# Patient Record
Sex: MALE | Race: WHITE | NOT HISPANIC OR LATINO | Employment: OTHER
[De-identification: names, ages, dates, MRNs, and addresses within clinical notes are randomized per-mention and may not be internally consistent; named-entity substitution may affect disease eponyms.]

---

## 2017-01-04 ENCOUNTER — CHARTING TRANS (OUTPATIENT)
Dept: OTHER | Age: 82
End: 2017-01-04

## 2017-01-09 ENCOUNTER — CHARTING TRANS (OUTPATIENT)
Dept: OTHER | Age: 82
End: 2017-01-09

## 2017-01-12 ENCOUNTER — CHARTING TRANS (OUTPATIENT)
Dept: OTHER | Age: 82
End: 2017-01-12

## 2017-02-07 ENCOUNTER — CHARTING TRANS (OUTPATIENT)
Dept: OTHER | Age: 82
End: 2017-02-07

## 2017-02-13 ENCOUNTER — CHARTING TRANS (OUTPATIENT)
Dept: OTHER | Age: 82
End: 2017-02-13

## 2017-02-14 ENCOUNTER — CHARTING TRANS (OUTPATIENT)
Dept: OTHER | Age: 82
End: 2017-02-14

## 2017-02-20 ENCOUNTER — CHARTING TRANS (OUTPATIENT)
Dept: OTHER | Age: 82
End: 2017-02-20

## 2017-02-20 ENCOUNTER — CHARTING TRANS (OUTPATIENT)
Dept: URGENT CARE | Age: 82
End: 2017-02-20

## 2017-02-20 ASSESSMENT — PAIN SCALES - GENERAL: PAINLEVEL_OUTOF10: 9

## 2017-03-02 ENCOUNTER — CHARTING TRANS (OUTPATIENT)
Dept: OTHER | Age: 82
End: 2017-03-02

## 2017-03-27 ENCOUNTER — CHARTING TRANS (OUTPATIENT)
Dept: OTHER | Age: 82
End: 2017-03-27

## 2017-03-30 ENCOUNTER — CHARTING TRANS (OUTPATIENT)
Dept: OTHER | Age: 82
End: 2017-03-30

## 2017-05-01 ENCOUNTER — CHARTING TRANS (OUTPATIENT)
Dept: UROLOGY | Age: 82
End: 2017-05-01

## 2017-05-07 ENCOUNTER — CHARTING TRANS (OUTPATIENT)
Dept: OTHER | Age: 82
End: 2017-05-07

## 2017-05-10 ENCOUNTER — CHARTING TRANS (OUTPATIENT)
Dept: OTHER | Age: 82
End: 2017-05-10

## 2017-05-19 ENCOUNTER — CHARTING TRANS (OUTPATIENT)
Dept: OTHER | Age: 82
End: 2017-05-19

## 2017-05-26 ENCOUNTER — CHARTING TRANS (OUTPATIENT)
Dept: OTHER | Age: 82
End: 2017-05-26

## 2017-06-02 ENCOUNTER — CHARTING TRANS (OUTPATIENT)
Dept: FAMILY MEDICINE | Age: 82
End: 2017-06-02

## 2017-06-02 ENCOUNTER — CHARTING TRANS (OUTPATIENT)
Dept: OTHER | Age: 82
End: 2017-06-02

## 2017-06-05 ENCOUNTER — CHARTING TRANS (OUTPATIENT)
Dept: OTHER | Age: 82
End: 2017-06-05

## 2017-06-06 ENCOUNTER — CHARTING TRANS (OUTPATIENT)
Dept: OTHER | Age: 82
End: 2017-06-06

## 2017-06-07 ENCOUNTER — IMAGING SERVICES (OUTPATIENT)
Dept: OTHER | Age: 82
End: 2017-06-07

## 2017-06-08 ENCOUNTER — CHARTING TRANS (OUTPATIENT)
Dept: OTHER | Age: 82
End: 2017-06-08

## 2017-06-09 ENCOUNTER — CHARTING TRANS (OUTPATIENT)
Dept: OTHER | Age: 82
End: 2017-06-09

## 2017-06-12 ENCOUNTER — CHARTING TRANS (OUTPATIENT)
Dept: OTHER | Age: 82
End: 2017-06-12

## 2017-06-14 ENCOUNTER — CHARTING TRANS (OUTPATIENT)
Dept: OTHER | Age: 82
End: 2017-06-14

## 2017-06-29 ENCOUNTER — CHARTING TRANS (OUTPATIENT)
Dept: OTHER | Age: 82
End: 2017-06-29

## 2017-08-28 ENCOUNTER — CHARTING TRANS (OUTPATIENT)
Dept: OTHER | Age: 82
End: 2017-08-28

## 2017-08-31 ENCOUNTER — CHARTING TRANS (OUTPATIENT)
Dept: OTHER | Age: 82
End: 2017-08-31

## 2017-09-01 ENCOUNTER — CHARTING TRANS (OUTPATIENT)
Dept: OTHER | Age: 82
End: 2017-09-01

## 2017-09-12 ENCOUNTER — CHARTING TRANS (OUTPATIENT)
Dept: OTHER | Age: 82
End: 2017-09-12

## 2017-09-25 ENCOUNTER — CHARTING TRANS (OUTPATIENT)
Dept: OTHER | Age: 82
End: 2017-09-25

## 2017-10-16 ENCOUNTER — CHARTING TRANS (OUTPATIENT)
Dept: OTHER | Age: 82
End: 2017-10-16

## 2017-11-17 ENCOUNTER — CHARTING TRANS (OUTPATIENT)
Dept: OTHER | Age: 82
End: 2017-11-17

## 2017-12-01 ENCOUNTER — CHARTING TRANS (OUTPATIENT)
Dept: OTHER | Age: 82
End: 2017-12-01

## 2017-12-05 ENCOUNTER — CHARTING TRANS (OUTPATIENT)
Dept: OTHER | Age: 82
End: 2017-12-05

## 2017-12-06 ENCOUNTER — CHARTING TRANS (OUTPATIENT)
Dept: URGENT CARE | Age: 82
End: 2017-12-06

## 2017-12-06 ENCOUNTER — IMAGING SERVICES (OUTPATIENT)
Dept: OTHER | Age: 82
End: 2017-12-06

## 2017-12-06 ASSESSMENT — PAIN SCALES - GENERAL: PAINLEVEL_OUTOF10: 8

## 2017-12-07 ENCOUNTER — CHARTING TRANS (OUTPATIENT)
Dept: OTHER | Age: 82
End: 2017-12-07

## 2017-12-08 ENCOUNTER — CHARTING TRANS (OUTPATIENT)
Dept: OTHER | Age: 82
End: 2017-12-08

## 2017-12-13 ENCOUNTER — CHARTING TRANS (OUTPATIENT)
Dept: FAMILY MEDICINE | Age: 82
End: 2017-12-13

## 2017-12-13 ENCOUNTER — CHARTING TRANS (OUTPATIENT)
Dept: OTHER | Age: 82
End: 2017-12-13

## 2017-12-14 ENCOUNTER — CHARTING TRANS (OUTPATIENT)
Dept: OTHER | Age: 82
End: 2017-12-14

## 2017-12-18 ENCOUNTER — CHARTING TRANS (OUTPATIENT)
Dept: PULMONOLOGY | Age: 82
End: 2017-12-18

## 2017-12-24 ENCOUNTER — CHARTING TRANS (OUTPATIENT)
Dept: OTHER | Age: 82
End: 2017-12-24

## 2017-12-29 ENCOUNTER — CHARTING TRANS (OUTPATIENT)
Dept: OTHER | Age: 82
End: 2017-12-29

## 2018-01-17 ENCOUNTER — CHARTING TRANS (OUTPATIENT)
Dept: OTHER | Age: 83
End: 2018-01-17

## 2018-01-19 ENCOUNTER — CHARTING TRANS (OUTPATIENT)
Dept: OTHER | Age: 83
End: 2018-01-19

## 2018-02-06 ENCOUNTER — CHARTING TRANS (OUTPATIENT)
Dept: OTHER | Age: 83
End: 2018-02-06

## 2018-02-14 ENCOUNTER — CHARTING TRANS (OUTPATIENT)
Dept: OTHER | Age: 83
End: 2018-02-14

## 2018-02-21 ENCOUNTER — CHARTING TRANS (OUTPATIENT)
Dept: OTHER | Age: 83
End: 2018-02-21

## 2018-02-28 ENCOUNTER — CHARTING TRANS (OUTPATIENT)
Dept: PAIN MANAGEMENT | Age: 83
End: 2018-02-28

## 2018-03-01 ENCOUNTER — CHARTING TRANS (OUTPATIENT)
Dept: OTHER | Age: 83
End: 2018-03-01

## 2018-04-17 ENCOUNTER — CHARTING TRANS (OUTPATIENT)
Dept: OTHER | Age: 83
End: 2018-04-17

## 2018-04-18 ENCOUNTER — LAB SERVICES (OUTPATIENT)
Dept: OTHER | Age: 83
End: 2018-04-18

## 2018-04-18 ENCOUNTER — IMAGING SERVICES (OUTPATIENT)
Dept: OTHER | Age: 83
End: 2018-04-18

## 2018-04-30 ENCOUNTER — CHARTING TRANS (OUTPATIENT)
Dept: OTHER | Age: 83
End: 2018-04-30

## 2018-04-30 LAB — MISCELLANEOUS #1: NORMAL

## 2018-05-03 ENCOUNTER — CHARTING TRANS (OUTPATIENT)
Dept: OTHER | Age: 83
End: 2018-05-03

## 2018-05-23 ENCOUNTER — CHARTING TRANS (OUTPATIENT)
Dept: OTHER | Age: 83
End: 2018-05-23

## 2018-05-24 ENCOUNTER — CHARTING TRANS (OUTPATIENT)
Dept: OTHER | Age: 83
End: 2018-05-24

## 2018-05-29 ENCOUNTER — CHARTING TRANS (OUTPATIENT)
Dept: OTHER | Age: 83
End: 2018-05-29

## 2018-06-01 ENCOUNTER — CHARTING TRANS (OUTPATIENT)
Dept: OTHER | Age: 83
End: 2018-06-01

## 2018-06-13 ENCOUNTER — CHARTING TRANS (OUTPATIENT)
Dept: OTHER | Age: 83
End: 2018-06-13

## 2018-06-18 ENCOUNTER — CHARTING TRANS (OUTPATIENT)
Dept: OTHER | Age: 83
End: 2018-06-18

## 2018-06-25 ENCOUNTER — CHARTING TRANS (OUTPATIENT)
Dept: OTHER | Age: 83
End: 2018-06-25

## 2018-06-26 ENCOUNTER — LAB SERVICES (OUTPATIENT)
Dept: OTHER | Age: 83
End: 2018-06-26

## 2018-06-26 ENCOUNTER — CHARTING TRANS (OUTPATIENT)
Dept: OTHER | Age: 83
End: 2018-06-26

## 2018-06-27 ENCOUNTER — CHARTING TRANS (OUTPATIENT)
Dept: OTHER | Age: 83
End: 2018-06-27

## 2018-06-27 LAB — FINAL REPORT: NORMAL

## 2018-07-11 ENCOUNTER — CHARTING TRANS (OUTPATIENT)
Dept: OTHER | Age: 83
End: 2018-07-11

## 2018-07-19 ENCOUNTER — CHARTING TRANS (OUTPATIENT)
Dept: OTHER | Age: 83
End: 2018-07-19

## 2018-07-24 ENCOUNTER — CHARTING TRANS (OUTPATIENT)
Dept: OTHER | Age: 83
End: 2018-07-24

## 2018-07-25 ENCOUNTER — CHARTING TRANS (OUTPATIENT)
Dept: OTHER | Age: 83
End: 2018-07-25

## 2018-08-02 ENCOUNTER — CHARTING TRANS (OUTPATIENT)
Dept: OTHER | Age: 83
End: 2018-08-02

## 2018-08-19 ENCOUNTER — CHARTING TRANS (OUTPATIENT)
Dept: OTHER | Age: 83
End: 2018-08-19

## 2018-08-23 ENCOUNTER — CHARTING TRANS (OUTPATIENT)
Dept: OTHER | Age: 83
End: 2018-08-23

## 2018-09-04 ENCOUNTER — CHARTING TRANS (OUTPATIENT)
Dept: OTHER | Age: 83
End: 2018-09-04

## 2018-09-06 ENCOUNTER — CHARTING TRANS (OUTPATIENT)
Dept: OTHER | Age: 83
End: 2018-09-06

## 2018-09-09 ENCOUNTER — LAB SERVICES (OUTPATIENT)
Dept: OTHER | Age: 83
End: 2018-09-09

## 2018-09-10 ENCOUNTER — CHARTING TRANS (OUTPATIENT)
Dept: OTHER | Age: 83
End: 2018-09-10

## 2018-09-12 ENCOUNTER — CHARTING TRANS (OUTPATIENT)
Dept: OTHER | Age: 83
End: 2018-09-12

## 2018-09-14 LAB
CULTURE BLOOD: NORMAL
CULTURE BLOOD: NORMAL

## 2018-09-18 ENCOUNTER — CHARTING TRANS (OUTPATIENT)
Dept: OTHER | Age: 83
End: 2018-09-18

## 2018-09-19 ENCOUNTER — CHARTING TRANS (OUTPATIENT)
Dept: OTHER | Age: 83
End: 2018-09-19

## 2018-09-20 ENCOUNTER — CHARTING TRANS (OUTPATIENT)
Dept: OTHER | Age: 83
End: 2018-09-20

## 2018-09-24 ENCOUNTER — CHARTING TRANS (OUTPATIENT)
Dept: OTHER | Age: 83
End: 2018-09-24

## 2018-10-01 ENCOUNTER — CHARTING TRANS (OUTPATIENT)
Dept: OTHER | Age: 83
End: 2018-10-01

## 2018-10-08 ENCOUNTER — CHARTING TRANS (OUTPATIENT)
Dept: OTHER | Age: 83
End: 2018-10-08

## 2018-10-15 ENCOUNTER — CHARTING TRANS (OUTPATIENT)
Dept: OTHER | Age: 83
End: 2018-10-15

## 2018-10-23 ENCOUNTER — CHARTING TRANS (OUTPATIENT)
Dept: OTHER | Age: 83
End: 2018-10-23

## 2018-10-23 ENCOUNTER — IMAGING SERVICES (OUTPATIENT)
Dept: OTHER | Age: 83
End: 2018-10-23

## 2018-11-06 ENCOUNTER — CHARTING TRANS (OUTPATIENT)
Dept: OTHER | Age: 83
End: 2018-11-06

## 2018-11-23 ENCOUNTER — IMAGING SERVICES (OUTPATIENT)
Dept: OTHER | Age: 83
End: 2018-11-23

## 2018-11-27 VITALS
DIASTOLIC BLOOD PRESSURE: 61 MMHG | RESPIRATION RATE: 20 BRPM | HEART RATE: 88 BPM | TEMPERATURE: 97.9 F | OXYGEN SATURATION: 99 % | SYSTOLIC BLOOD PRESSURE: 124 MMHG

## 2018-11-27 VITALS
BODY MASS INDEX: 30.16 KG/M2 | SYSTOLIC BLOOD PRESSURE: 102 MMHG | OXYGEN SATURATION: 92 % | WEIGHT: 199 LBS | HEIGHT: 68 IN | DIASTOLIC BLOOD PRESSURE: 64 MMHG | TEMPERATURE: 98 F | RESPIRATION RATE: 20 BRPM | HEART RATE: 102 BPM

## 2018-11-28 ENCOUNTER — CHARTING TRANS (OUTPATIENT)
Dept: OTHER | Age: 83
End: 2018-11-28

## 2018-11-28 VITALS
DIASTOLIC BLOOD PRESSURE: 68 MMHG | HEART RATE: 83 BPM | SYSTOLIC BLOOD PRESSURE: 130 MMHG | BODY MASS INDEX: 28.95 KG/M2 | HEIGHT: 68 IN | WEIGHT: 191 LBS | OXYGEN SATURATION: 92 %

## 2018-11-28 VITALS
SYSTOLIC BLOOD PRESSURE: 130 MMHG | TEMPERATURE: 98 F | WEIGHT: 191 LBS | HEART RATE: 84 BPM | RESPIRATION RATE: 20 BRPM | DIASTOLIC BLOOD PRESSURE: 60 MMHG

## 2018-11-28 VITALS
DIASTOLIC BLOOD PRESSURE: 60 MMHG | BODY MASS INDEX: 30.46 KG/M2 | HEART RATE: 100 BPM | HEIGHT: 68 IN | WEIGHT: 201 LBS | OXYGEN SATURATION: 94 % | SYSTOLIC BLOOD PRESSURE: 122 MMHG

## 2018-11-29 VITALS — BODY MASS INDEX: 29.4 KG/M2 | TEMPERATURE: 98.1 F | HEIGHT: 68 IN | WEIGHT: 194 LBS

## 2018-11-29 VITALS
DIASTOLIC BLOOD PRESSURE: 48 MMHG | OXYGEN SATURATION: 95 % | SYSTOLIC BLOOD PRESSURE: 145 MMHG | HEART RATE: 90 BPM | RESPIRATION RATE: 20 BRPM | TEMPERATURE: 97.8 F

## 2018-11-30 ENCOUNTER — LAB SERVICES (OUTPATIENT)
Dept: OTHER | Age: 83
End: 2018-11-30

## 2018-11-30 ENCOUNTER — CHARTING TRANS (OUTPATIENT)
Dept: OTHER | Age: 83
End: 2018-11-30

## 2018-11-30 LAB
ALBUMIN SERPL-MCNC: 3.6 G/DL (ref 3.6–5.1)
ALP SERPL-CCNC: 108 U/L (ref 45–115)
ALT SERPL W/O P-5'-P-CCNC: 11 U/L (ref 5–49)
AST SERPL-CCNC: 14 U/L (ref 14–43)
BILIRUB SERPL-MCNC: 0.7 MG/DL (ref 0–1.3)
BUN SERPL-MCNC: 13 MG/DL (ref 6–27)
CALCIUM SERPL-MCNC: 9.1 MG/DL (ref 8.6–10.6)
CHLORIDE SERPL-SCNC: 103 MMOL/L (ref 96–107)
CHOLEST SERPL-MCNC: 208 MG/DL (ref 140–200)
CO2 SERPL-SCNC: 32 MMOL/L (ref 22–32)
CREAT SERPL-MCNC: 0.8 MG/DL (ref 0.6–1.6)
GFR SERPL CREATININE-BSD FRML MDRD: >60 ML/MIN/{1.73M2}
GFR SERPL CREATININE-BSD FRML MDRD: >60 ML/MIN/{1.73M2}
GLUCOSE P FAST SERPL-MCNC: 95 MG/DL (ref 60–100)
HDLC SERPL-MCNC: 63 MG/DL
LDLC SERPL CALC-MCNC: 129 MG/DL (ref 30–100)
POTASSIUM SERPL-SCNC: 4.5 MMOL/L (ref 3.5–5.3)
PROT SERPL-MCNC: 7 G/DL (ref 6.4–8.5)
SODIUM SERPL-SCNC: 142 MMOL/L (ref 136–146)
TRIGL SERPL-MCNC: 80 MG/DL (ref 0–200)

## 2018-12-07 DIAGNOSIS — J44.1 CHRONIC OBSTRUCTIVE PULMONARY DISEASE WITH (ACUTE) EXACERBATION (CMD): Primary | ICD-10-CM

## 2018-12-07 RX ORDER — FUROSEMIDE 20 MG/1
20 TABLET ORAL DAILY
COMMUNITY
Start: 2018-11-01 | End: 2018-12-07 | Stop reason: SDUPTHER

## 2018-12-07 RX ORDER — DOXAZOSIN 8 MG/1
8 TABLET ORAL NIGHTLY
COMMUNITY
Start: 2018-06-01 | End: 2019-06-19 | Stop reason: SDUPTHER

## 2018-12-07 RX ORDER — TOLTERODINE TARTRATE 2 MG/1
2 TABLET, EXTENDED RELEASE ORAL DAILY
COMMUNITY
Start: 2018-05-24 | End: 2019-07-11 | Stop reason: SDUPTHER

## 2018-12-07 RX ORDER — FUROSEMIDE 20 MG/1
20 TABLET ORAL DAILY
Qty: 30 TABLET | Refills: 1 | Status: SHIPPED | OUTPATIENT
Start: 2018-12-07 | End: 2019-04-15 | Stop reason: SDUPTHER

## 2018-12-07 RX ORDER — LATANOPROST 50 UG/ML
1 SOLUTION/ DROPS OPHTHALMIC NIGHTLY
COMMUNITY
Start: 2018-10-08 | End: 2019-11-15 | Stop reason: SDUPTHER

## 2018-12-07 RX ORDER — FLUTICASONE PROPIONATE AND SALMETEROL 250; 50 UG/1; UG/1
1 POWDER RESPIRATORY (INHALATION) 2 TIMES DAILY
COMMUNITY
Start: 2018-05-25 | End: 2019-02-05 | Stop reason: SDUPTHER

## 2018-12-07 RX ORDER — ALBUTEROL SULFATE 2.5 MG/3ML
2.5 SOLUTION RESPIRATORY (INHALATION) EVERY 6 HOURS PRN
COMMUNITY
Start: 2017-11-20 | End: 2020-07-07

## 2018-12-07 RX ORDER — FINASTERIDE 5 MG/1
5 TABLET, FILM COATED ORAL DAILY
COMMUNITY
Start: 2018-06-01 | End: 2019-07-11 | Stop reason: SDUPTHER

## 2018-12-19 ENCOUNTER — OFFICE VISIT (OUTPATIENT)
Dept: OPHTHALMOLOGY | Age: 83
End: 2018-12-19

## 2018-12-19 DIAGNOSIS — H26.493 BILATERAL POSTERIOR CAPSULAR OPACIFICATION: ICD-10-CM

## 2018-12-19 DIAGNOSIS — H40.1121 PRIMARY OPEN ANGLE GLAUCOMA (POAG) OF LEFT EYE, MILD STAGE: ICD-10-CM

## 2018-12-19 DIAGNOSIS — H40.1113 PRIMARY OPEN ANGLE GLAUCOMA (POAG) OF RIGHT EYE, SEVERE STAGE: Primary | ICD-10-CM

## 2018-12-19 PROCEDURE — 92014 COMPRE OPH EXAM EST PT 1/>: CPT | Performed by: OPHTHALMOLOGY

## 2018-12-19 PROCEDURE — 92133 CPTRZD OPH DX IMG PST SGM ON: CPT | Performed by: OPHTHALMOLOGY

## 2018-12-20 ENCOUNTER — WALK IN (OUTPATIENT)
Dept: URGENT CARE | Age: 83
End: 2018-12-20

## 2018-12-20 DIAGNOSIS — M54.9 CHRONIC BACK PAIN, UNSPECIFIED BACK LOCATION, UNSPECIFIED BACK PAIN LATERALITY: Primary | ICD-10-CM

## 2018-12-20 DIAGNOSIS — G89.29 CHRONIC BACK PAIN, UNSPECIFIED BACK LOCATION, UNSPECIFIED BACK PAIN LATERALITY: Primary | ICD-10-CM

## 2018-12-20 PROCEDURE — 99214 OFFICE O/P EST MOD 30 MIN: CPT | Performed by: FAMILY MEDICINE

## 2018-12-20 RX ORDER — FINASTERIDE 5 MG/1
TABLET, FILM COATED ORAL
COMMUNITY
Start: 2017-04-02 | End: 2019-02-05 | Stop reason: SDUPTHER

## 2018-12-20 RX ORDER — TOLTERODINE TARTRATE 2 MG/1
TABLET, EXTENDED RELEASE ORAL
COMMUNITY
Start: 2017-05-01 | End: 2019-02-05 | Stop reason: SDUPTHER

## 2018-12-20 RX ORDER — DOXAZOSIN 8 MG/1
TABLET ORAL
COMMUNITY
Start: 2017-05-01 | End: 2019-02-05 | Stop reason: SDUPTHER

## 2018-12-20 RX ORDER — ACETAMINOPHEN 500 MG
TABLET ORAL
COMMUNITY

## 2018-12-20 RX ORDER — HYDROCODONE BITARTRATE AND ACETAMINOPHEN 5; 325 MG/1; MG/1
1 TABLET ORAL EVERY 6 HOURS PRN
Qty: 20 TABLET | Refills: 0 | Status: SHIPPED | OUTPATIENT
Start: 2018-12-20 | End: 2019-04-11 | Stop reason: ALTCHOICE

## 2018-12-20 RX ORDER — FLUTICASONE PROPIONATE AND SALMETEROL 250; 50 UG/1; UG/1
POWDER RESPIRATORY (INHALATION)
COMMUNITY
Start: 2007-07-19 | End: 2019-02-05 | Stop reason: SDUPTHER

## 2018-12-20 ASSESSMENT — PAIN SCALES - GENERAL: PAINLEVEL: 5-6

## 2018-12-20 ASSESSMENT — ENCOUNTER SYMPTOMS: BACK PAIN: 1

## 2019-01-07 ENCOUNTER — OFFICE VISIT (OUTPATIENT)
Dept: OTOLARYNGOLOGY | Age: 84
End: 2019-01-07

## 2019-01-07 VITALS — BODY MASS INDEX: 29.55 KG/M2 | WEIGHT: 195 LBS | HEIGHT: 68 IN

## 2019-01-07 DIAGNOSIS — H92.09 DISCOMFORT OF EAR, UNSPECIFIED LATERALITY: ICD-10-CM

## 2019-01-07 DIAGNOSIS — H61.20 IMPACTED CERUMEN, UNSPECIFIED LATERALITY: ICD-10-CM

## 2019-01-07 DIAGNOSIS — H93.8X9 SENSATION OF FULLNESS IN EAR, UNSPECIFIED LATERALITY: Primary | ICD-10-CM

## 2019-01-07 DIAGNOSIS — H93.19 TINNITUS, UNSPECIFIED LATERALITY: ICD-10-CM

## 2019-01-07 PROCEDURE — 99213 OFFICE O/P EST LOW 20 MIN: CPT | Performed by: OTOLARYNGOLOGY

## 2019-01-07 PROCEDURE — 69210 REMOVE IMPACTED EAR WAX UNI: CPT | Performed by: OTOLARYNGOLOGY

## 2019-01-24 ENCOUNTER — IMAGING SERVICES (OUTPATIENT)
Dept: OTHER | Age: 84
End: 2019-01-24

## 2019-01-30 ENCOUNTER — TELEPHONE (OUTPATIENT)
Dept: PULMONOLOGY | Age: 84
End: 2019-01-30

## 2019-02-04 RX ORDER — PREDNISONE 20 MG/1
TABLET ORAL
COMMUNITY
End: 2019-02-05 | Stop reason: ALTCHOICE

## 2019-02-04 RX ORDER — AZITHROMYCIN 250 MG/1
TABLET, FILM COATED ORAL
COMMUNITY
End: 2019-02-05 | Stop reason: ALTCHOICE

## 2019-02-05 ENCOUNTER — OFFICE VISIT (OUTPATIENT)
Dept: PULMONOLOGY | Age: 84
End: 2019-02-05

## 2019-02-05 DIAGNOSIS — J43.2 CENTRILOBULAR EMPHYSEMA (CMD): ICD-10-CM

## 2019-02-05 DIAGNOSIS — Z23 NEED FOR PROPHYLACTIC VACCINATION AND INOCULATION AGAINST INFLUENZA: Primary | ICD-10-CM

## 2019-02-05 PROCEDURE — 99214 OFFICE O/P EST MOD 30 MIN: CPT | Performed by: INTERNAL MEDICINE

## 2019-02-05 PROCEDURE — G0008 ADMIN INFLUENZA VIRUS VAC: HCPCS

## 2019-02-05 PROCEDURE — 90686 IIV4 VACC NO PRSV 0.5 ML IM: CPT

## 2019-02-05 RX ORDER — FLUTICASONE PROPIONATE AND SALMETEROL 250; 50 UG/1; UG/1
1 POWDER RESPIRATORY (INHALATION) 2 TIMES DAILY
Qty: 60 EACH | Refills: 11 | Status: SHIPPED | OUTPATIENT
Start: 2019-02-05 | End: 2020-03-17 | Stop reason: SDUPTHER

## 2019-02-05 SDOH — HEALTH STABILITY: MENTAL HEALTH: HOW OFTEN DO YOU HAVE A DRINK CONTAINING ALCOHOL?: NEVER

## 2019-02-05 ASSESSMENT — PAIN SCALES - GENERAL: PAINLEVEL: 0

## 2019-03-05 VITALS
HEIGHT: 68 IN | HEART RATE: 92 BPM | SYSTOLIC BLOOD PRESSURE: 98 MMHG | DIASTOLIC BLOOD PRESSURE: 58 MMHG | RESPIRATION RATE: 18 BRPM | WEIGHT: 192 LBS | TEMPERATURE: 98.2 F | BODY MASS INDEX: 29.1 KG/M2

## 2019-03-05 VITALS — TEMPERATURE: 98.4 F | HEIGHT: 68 IN | WEIGHT: 198 LBS | BODY MASS INDEX: 30.01 KG/M2

## 2019-03-06 VITALS
TEMPERATURE: 98.4 F | WEIGHT: 196 LBS | RESPIRATION RATE: 18 BRPM | DIASTOLIC BLOOD PRESSURE: 56 MMHG | SYSTOLIC BLOOD PRESSURE: 102 MMHG | HEART RATE: 80 BPM | BODY MASS INDEX: 29.8 KG/M2

## 2019-03-06 VITALS — HEIGHT: 68 IN | WEIGHT: 196 LBS | BODY MASS INDEX: 29.7 KG/M2

## 2019-03-06 VITALS
DIASTOLIC BLOOD PRESSURE: 70 MMHG | RESPIRATION RATE: 16 BRPM | BODY MASS INDEX: 30.01 KG/M2 | SYSTOLIC BLOOD PRESSURE: 120 MMHG | HEIGHT: 68 IN | WEIGHT: 198 LBS | TEMPERATURE: 98.7 F | HEART RATE: 60 BPM

## 2019-03-19 ENCOUNTER — DOCUMENTATION (OUTPATIENT)
Dept: PULMONOLOGY | Age: 84
End: 2019-03-19

## 2019-04-11 ENCOUNTER — WALK IN (OUTPATIENT)
Dept: URGENT CARE | Age: 84
End: 2019-04-11

## 2019-04-11 DIAGNOSIS — Z87.09 HISTORY OF COPD: ICD-10-CM

## 2019-04-11 DIAGNOSIS — F17.200 SMOKER: ICD-10-CM

## 2019-04-11 DIAGNOSIS — R06.09 DYSPNEA ON EXERTION: Primary | ICD-10-CM

## 2019-04-11 PROBLEM — N41.1 CHRONIC PROSTATITIS: Status: ACTIVE | Noted: 2018-06-01

## 2019-04-11 PROBLEM — M51.369 LUMBAR DEGENERATIVE DISC DISEASE: Status: ACTIVE | Noted: 2017-06-21

## 2019-04-11 PROBLEM — I71.40 AAA (ABDOMINAL AORTIC ANEURYSM) (CMD): Status: ACTIVE | Noted: 2018-11-15

## 2019-04-11 PROBLEM — I72.9 ANEURYSM OF UNSPECIFIED SITE (CMD): Status: ACTIVE | Noted: 2019-04-11

## 2019-04-11 PROBLEM — M54.16 LUMBAR RADICULOPATHY: Status: ACTIVE | Noted: 2017-06-21

## 2019-04-11 PROBLEM — M51.36 LUMBAR DEGENERATIVE DISC DISEASE: Status: ACTIVE | Noted: 2017-06-21

## 2019-04-11 PROCEDURE — 99213 OFFICE O/P EST LOW 20 MIN: CPT | Performed by: FAMILY MEDICINE

## 2019-04-11 ASSESSMENT — PAIN SCALES - GENERAL: PAINLEVEL: 7-8

## 2019-04-16 RX ORDER — FUROSEMIDE 20 MG/1
20 TABLET ORAL DAILY
Qty: 90 TABLET | Refills: 0 | Status: SHIPPED | OUTPATIENT
Start: 2019-04-16 | End: 2019-05-14 | Stop reason: ALTCHOICE

## 2019-04-16 RX ORDER — FUROSEMIDE 20 MG/1
20 TABLET ORAL DAILY
Qty: 30 TABLET | Refills: 0 | Status: SHIPPED | OUTPATIENT
Start: 2019-04-16 | End: 2019-04-16 | Stop reason: SDUPTHER

## 2019-04-18 ENCOUNTER — TELEPHONE (OUTPATIENT)
Dept: PULMONOLOGY | Age: 84
End: 2019-04-18

## 2019-04-18 DIAGNOSIS — J44.9 CHRONIC OBSTRUCTIVE PULMONARY DISEASE, UNSPECIFIED COPD TYPE (CMD): Primary | ICD-10-CM

## 2019-04-23 ENCOUNTER — APPOINTMENT (OUTPATIENT)
Dept: OPHTHALMOLOGY | Age: 84
End: 2019-04-23

## 2019-05-14 ENCOUNTER — OFFICE VISIT (OUTPATIENT)
Dept: DERMATOLOGY | Age: 84
End: 2019-05-14

## 2019-05-14 DIAGNOSIS — L82.1 SEBORRHEIC KERATOSIS: ICD-10-CM

## 2019-05-14 DIAGNOSIS — L21.9 SEBORRHEIC DERMATITIS: ICD-10-CM

## 2019-05-14 DIAGNOSIS — L57.0 KERATOSIS, ACTINIC: Primary | ICD-10-CM

## 2019-05-14 PROCEDURE — 17000 DESTRUCT PREMALG LESION: CPT | Performed by: DERMATOLOGY

## 2019-05-14 PROCEDURE — 99213 OFFICE O/P EST LOW 20 MIN: CPT | Performed by: DERMATOLOGY

## 2019-05-14 PROCEDURE — 17003 DESTRUCT PREMALG LES 2-14: CPT | Performed by: DERMATOLOGY

## 2019-05-15 ENCOUNTER — OFFICE VISIT (OUTPATIENT)
Dept: OPHTHALMOLOGY | Age: 84
End: 2019-05-15

## 2019-05-15 DIAGNOSIS — H40.1113 PRIMARY OPEN ANGLE GLAUCOMA (POAG) OF RIGHT EYE, SEVERE STAGE: Primary | ICD-10-CM

## 2019-05-15 DIAGNOSIS — H40.1121 PRIMARY OPEN ANGLE GLAUCOMA (POAG) OF LEFT EYE, MILD STAGE: ICD-10-CM

## 2019-05-15 DIAGNOSIS — H40.013 OPEN ANGLE WITH BORDERLINE FINDINGS AND LOW GLAUCOMA RISK IN BOTH EYES: ICD-10-CM

## 2019-05-15 PROCEDURE — 99213 OFFICE O/P EST LOW 20 MIN: CPT | Performed by: OPHTHALMOLOGY

## 2019-05-15 PROCEDURE — 92083 EXTENDED VISUAL FIELD XM: CPT | Performed by: OPHTHALMOLOGY

## 2019-05-15 SDOH — HEALTH STABILITY: MENTAL HEALTH: HOW OFTEN DO YOU HAVE A DRINK CONTAINING ALCOHOL?: NEVER

## 2019-06-07 ENCOUNTER — WALK IN (OUTPATIENT)
Dept: URGENT CARE | Age: 84
End: 2019-06-07

## 2019-06-07 DIAGNOSIS — M75.80 ROTATOR CUFF TENDINITIS, UNSPECIFIED LATERALITY: Primary | ICD-10-CM

## 2019-06-07 PROCEDURE — 99214 OFFICE O/P EST MOD 30 MIN: CPT | Performed by: INTERNAL MEDICINE

## 2019-06-07 RX ORDER — HYDROCODONE BITARTRATE AND ACETAMINOPHEN 5; 325 MG/1; MG/1
1 TABLET ORAL EVERY 6 HOURS PRN
Qty: 20 TABLET | Refills: 0 | Status: SHIPPED | OUTPATIENT
Start: 2019-06-07 | End: 2020-11-23 | Stop reason: ALTCHOICE

## 2019-06-07 ASSESSMENT — PAIN SCALES - GENERAL: PAINLEVEL: 5-6

## 2019-06-19 ENCOUNTER — TELEPHONE (OUTPATIENT)
Dept: UROLOGY | Age: 84
End: 2019-06-19

## 2019-06-19 RX ORDER — DOXAZOSIN 8 MG/1
8 TABLET ORAL NIGHTLY
Qty: 90 TABLET | Refills: 1 | Status: SHIPPED | OUTPATIENT
Start: 2019-06-19 | End: 2019-12-17 | Stop reason: SDUPTHER

## 2019-06-20 ENCOUNTER — TELEPHONE (OUTPATIENT)
Dept: PULMONOLOGY | Age: 84
End: 2019-06-20

## 2019-06-21 RX ORDER — AZITHROMYCIN 250 MG/1
TABLET, FILM COATED ORAL
Qty: 6 TABLET | Refills: 0 | Status: SHIPPED | OUTPATIENT
Start: 2019-06-21 | End: 2019-07-18 | Stop reason: SDUPTHER

## 2019-06-21 RX ORDER — PREDNISONE 20 MG/1
40 TABLET ORAL DAILY
Qty: 10 TABLET | Refills: 0 | Status: SHIPPED | OUTPATIENT
Start: 2019-06-21 | End: 2019-06-26

## 2019-07-11 RX ORDER — TOLTERODINE TARTRATE 2 MG/1
2 TABLET, EXTENDED RELEASE ORAL 2 TIMES DAILY
Qty: 180 TABLET | Refills: 1 | Status: SHIPPED | OUTPATIENT
Start: 2019-07-11 | End: 2020-01-28 | Stop reason: SDUPTHER

## 2019-07-11 RX ORDER — FINASTERIDE 5 MG/1
5 TABLET, FILM COATED ORAL DAILY
Qty: 90 TABLET | Refills: 1 | Status: SHIPPED | OUTPATIENT
Start: 2019-07-11 | End: 2020-01-03 | Stop reason: SDUPTHER

## 2019-07-18 ENCOUNTER — TELEPHONE (OUTPATIENT)
Dept: PULMONOLOGY | Age: 84
End: 2019-07-18

## 2019-07-18 RX ORDER — AZITHROMYCIN 250 MG/1
TABLET, FILM COATED ORAL
Qty: 6 TABLET | Refills: 0 | Status: SHIPPED | OUTPATIENT
Start: 2019-07-18 | End: 2019-08-09 | Stop reason: ALTCHOICE

## 2019-07-18 RX ORDER — PREDNISONE 20 MG/1
40 TABLET ORAL DAILY
Qty: 10 TABLET | Refills: 0 | Status: SHIPPED | OUTPATIENT
Start: 2019-07-18 | End: 2019-08-09 | Stop reason: ALTCHOICE

## 2019-07-24 RX ORDER — FUROSEMIDE 20 MG/1
TABLET ORAL
Qty: 90 TABLET | Refills: 1 | Status: SHIPPED | OUTPATIENT
Start: 2019-07-24 | End: 2022-04-18 | Stop reason: SDUPTHER

## 2019-08-09 ENCOUNTER — LAB SERVICES (OUTPATIENT)
Dept: LAB | Age: 84
End: 2019-08-09

## 2019-08-09 ENCOUNTER — IMAGING SERVICES (OUTPATIENT)
Dept: GENERAL RADIOLOGY | Age: 84
End: 2019-08-09
Attending: INTERNAL MEDICINE

## 2019-08-09 ENCOUNTER — OFFICE VISIT (OUTPATIENT)
Dept: PULMONOLOGY | Age: 84
End: 2019-08-09

## 2019-08-09 VITALS
SYSTOLIC BLOOD PRESSURE: 122 MMHG | BODY MASS INDEX: 28.64 KG/M2 | WEIGHT: 189 LBS | OXYGEN SATURATION: 94 % | DIASTOLIC BLOOD PRESSURE: 66 MMHG | HEIGHT: 68 IN | HEART RATE: 99 BPM | RESPIRATION RATE: 20 BRPM

## 2019-08-09 DIAGNOSIS — J44.9 CHRONIC OBSTRUCTIVE PULMONARY DISEASE, UNSPECIFIED COPD TYPE (CMD): ICD-10-CM

## 2019-08-09 DIAGNOSIS — J44.9 CHRONIC OBSTRUCTIVE PULMONARY DISEASE, UNSPECIFIED COPD TYPE (CMD): Primary | ICD-10-CM

## 2019-08-09 PROCEDURE — 99214 OFFICE O/P EST MOD 30 MIN: CPT | Performed by: INTERNAL MEDICINE

## 2019-08-09 PROCEDURE — 71046 X-RAY EXAM CHEST 2 VIEWS: CPT | Performed by: RADIOLOGY

## 2019-08-09 PROCEDURE — 87106 FUNGI IDENTIFICATION YEAST: CPT | Performed by: INTERNAL MEDICINE

## 2019-08-09 PROCEDURE — 87205 SMEAR GRAM STAIN: CPT | Performed by: INTERNAL MEDICINE

## 2019-08-09 PROCEDURE — 94010 BREATHING CAPACITY TEST: CPT | Performed by: INTERNAL MEDICINE

## 2019-08-09 PROCEDURE — 87186 SC STD MICRODIL/AGAR DIL: CPT | Performed by: INTERNAL MEDICINE

## 2019-08-09 PROCEDURE — 87070 CULTURE OTHR SPECIMN AEROBIC: CPT | Performed by: INTERNAL MEDICINE

## 2019-08-09 RX ORDER — PREDNISONE 10 MG/1
10 TABLET ORAL DAILY
Qty: 30 TABLET | Refills: 0 | Status: SHIPPED | OUTPATIENT
Start: 2019-08-09 | End: 2020-04-10 | Stop reason: ALTCHOICE

## 2019-08-09 RX ORDER — PREDNISONE 10 MG/1
TABLET ORAL
Qty: 30 TABLET | Refills: 0 | Status: SHIPPED | OUTPATIENT
Start: 2019-08-09 | End: 2019-09-06 | Stop reason: ALTCHOICE

## 2019-08-09 RX ORDER — DOXYCYCLINE HYCLATE 100 MG/1
100 CAPSULE ORAL 2 TIMES DAILY
Qty: 20 CAPSULE | Refills: 0 | Status: SHIPPED | OUTPATIENT
Start: 2019-08-09 | End: 2019-08-16

## 2019-08-14 LAB — FINAL REPORT: ABNORMAL

## 2019-08-19 LAB — MISCELLANEOUS #1: NORMAL

## 2019-09-06 ENCOUNTER — OFFICE VISIT (OUTPATIENT)
Dept: PULMONOLOGY | Age: 84
End: 2019-09-06

## 2019-09-06 VITALS
DIASTOLIC BLOOD PRESSURE: 62 MMHG | SYSTOLIC BLOOD PRESSURE: 128 MMHG | TEMPERATURE: 99.3 F | BODY MASS INDEX: 28.79 KG/M2 | WEIGHT: 190 LBS | OXYGEN SATURATION: 94 % | RESPIRATION RATE: 20 BRPM | HEART RATE: 112 BPM | HEIGHT: 68 IN

## 2019-09-06 DIAGNOSIS — J44.9 CHRONIC OBSTRUCTIVE PULMONARY DISEASE, UNSPECIFIED COPD TYPE (CMD): ICD-10-CM

## 2019-09-06 PROCEDURE — 99214 OFFICE O/P EST MOD 30 MIN: CPT | Performed by: INTERNAL MEDICINE

## 2019-09-06 SDOH — HEALTH STABILITY: MENTAL HEALTH: HOW OFTEN DO YOU HAVE A DRINK CONTAINING ALCOHOL?: NEVER

## 2019-09-23 ENCOUNTER — TELEPHONE (OUTPATIENT)
Dept: PULMONOLOGY | Age: 84
End: 2019-09-23

## 2019-09-30 ENCOUNTER — OFFICE VISIT (OUTPATIENT)
Dept: OPHTHALMOLOGY | Age: 84
End: 2019-09-30

## 2019-09-30 DIAGNOSIS — H40.1113 PRIMARY OPEN ANGLE GLAUCOMA (POAG) OF RIGHT EYE, SEVERE STAGE: Primary | ICD-10-CM

## 2019-09-30 DIAGNOSIS — H40.1121 PRIMARY OPEN ANGLE GLAUCOMA (POAG) OF LEFT EYE, MILD STAGE: ICD-10-CM

## 2019-09-30 PROCEDURE — 99213 OFFICE O/P EST LOW 20 MIN: CPT | Performed by: OPHTHALMOLOGY

## 2019-10-18 ENCOUNTER — TELEPHONE (OUTPATIENT)
Dept: UROLOGY | Age: 84
End: 2019-10-18

## 2019-10-18 DIAGNOSIS — R39.15 URGENCY OF URINATION: Primary | ICD-10-CM

## 2019-10-21 ENCOUNTER — OFFICE VISIT (OUTPATIENT)
Dept: UROLOGY | Age: 84
End: 2019-10-21

## 2019-10-21 VITALS — WEIGHT: 182 LBS | BODY MASS INDEX: 27.58 KG/M2 | TEMPERATURE: 98 F | HEIGHT: 68 IN

## 2019-10-21 DIAGNOSIS — N41.1 CHRONIC PROSTATITIS: Primary | ICD-10-CM

## 2019-10-21 DIAGNOSIS — R39.15 URGENCY OF URINATION: ICD-10-CM

## 2019-10-21 DIAGNOSIS — N39.41 URGE INCONTINENCE: ICD-10-CM

## 2019-10-21 LAB
BILIRUBIN URINE: NEGATIVE
BLDR SCAN MLS: NORMAL
BLOOD URINE: ABNORMAL
CLARITY: CLEAR
COLOR: YELLOW
GLUCOSE QUALITATIVE U: NEGATIVE
KETONES, URINE: NEGATIVE
LEUKOCYTE ESTERASE URINE: ABNORMAL
MUCOUS: ABNORMAL
NITRITE URINE: NEGATIVE
PH URINE: 6 (ref 5–7)
RED BLOOD CELLS URINE: ABNORMAL (ref 0–3)
SPECIFIC GRAVITY URINE: 1.01 (ref 1–1.03)
SPERM: PRESENT
SQUAMOUS EPITHELIAL CELLS: 0
URINE PROTEIN, QUAL (DIPSTICK): NEGATIVE
UROBILINOGEN URINE: <2
WHITE BLOOD CELLS URINE: ABNORMAL (ref 0–5)

## 2019-10-21 PROCEDURE — 87086 URINE CULTURE/COLONY COUNT: CPT | Performed by: UROLOGY

## 2019-10-21 PROCEDURE — 99214 OFFICE O/P EST MOD 30 MIN: CPT | Performed by: NURSE PRACTITIONER

## 2019-10-21 PROCEDURE — 51798 US URINE CAPACITY MEASURE: CPT | Performed by: NURSE PRACTITIONER

## 2019-10-21 PROCEDURE — 81003 URINALYSIS AUTO W/O SCOPE: CPT | Performed by: UROLOGY

## 2019-10-21 RX ORDER — DOXYCYCLINE HYCLATE 100 MG/1
100 CAPSULE ORAL 2 TIMES DAILY
Qty: 60 CAPSULE | Refills: 0 | Status: SHIPPED | OUTPATIENT
Start: 2019-10-21 | End: 2020-02-18 | Stop reason: ALTCHOICE

## 2019-10-21 SDOH — HEALTH STABILITY: MENTAL HEALTH: HOW OFTEN DO YOU HAVE A DRINK CONTAINING ALCOHOL?: NEVER

## 2019-10-22 LAB — FINAL REPORT: NORMAL

## 2019-10-29 ENCOUNTER — TELEPHONE (OUTPATIENT)
Dept: CARDIOLOGY | Age: 84
End: 2019-10-29

## 2019-11-08 ENCOUNTER — TELEPHONE (OUTPATIENT)
Dept: PULMONOLOGY | Age: 84
End: 2019-11-08

## 2019-11-15 RX ORDER — LATANOPROST 50 UG/ML
SOLUTION/ DROPS OPHTHALMIC
Qty: 2.5 ML | Refills: 12 | Status: SHIPPED | OUTPATIENT
Start: 2019-11-15 | End: 2020-09-24 | Stop reason: SDUPTHER

## 2019-11-27 ENCOUNTER — EXTERNAL RECORD (OUTPATIENT)
Dept: HEALTH INFORMATION MANAGEMENT | Facility: OTHER | Age: 84
End: 2019-11-27

## 2019-12-17 ENCOUNTER — TELEPHONE (OUTPATIENT)
Dept: UROLOGY | Age: 84
End: 2019-12-17

## 2019-12-17 RX ORDER — DOXAZOSIN 8 MG/1
8 TABLET ORAL NIGHTLY
Qty: 90 TABLET | Refills: 3 | Status: SHIPPED | OUTPATIENT
Start: 2019-12-17 | End: 2019-12-30 | Stop reason: SDUPTHER

## 2019-12-30 ENCOUNTER — TELEPHONE (OUTPATIENT)
Dept: UROLOGY | Age: 84
End: 2019-12-30

## 2019-12-30 RX ORDER — DOXAZOSIN 8 MG/1
8 TABLET ORAL NIGHTLY
Qty: 90 TABLET | Refills: 3 | Status: SHIPPED | OUTPATIENT
Start: 2019-12-30 | End: 2020-04-22 | Stop reason: SDUPTHER

## 2020-01-03 RX ORDER — FINASTERIDE 5 MG/1
5 TABLET, FILM COATED ORAL DAILY
Qty: 90 TABLET | Refills: 3 | Status: SHIPPED | OUTPATIENT
Start: 2020-01-03 | End: 2020-04-22 | Stop reason: SDUPTHER

## 2020-01-06 ENCOUNTER — TELEPHONE (OUTPATIENT)
Dept: PULMONOLOGY | Age: 85
End: 2020-01-06

## 2020-01-06 DIAGNOSIS — J44.9 CHRONIC OBSTRUCTIVE PULMONARY DISEASE, UNSPECIFIED COPD TYPE (CMD): Primary | ICD-10-CM

## 2020-01-10 ENCOUNTER — OFFICE VISIT (OUTPATIENT)
Dept: PULMONOLOGY | Age: 85
End: 2020-01-10

## 2020-01-10 VITALS
WEIGHT: 179 LBS | OXYGEN SATURATION: 94 % | DIASTOLIC BLOOD PRESSURE: 60 MMHG | TEMPERATURE: 97.7 F | HEIGHT: 68 IN | BODY MASS INDEX: 27.13 KG/M2 | HEART RATE: 108 BPM | SYSTOLIC BLOOD PRESSURE: 130 MMHG

## 2020-01-10 DIAGNOSIS — J44.9 CHRONIC OBSTRUCTIVE PULMONARY DISEASE, UNSPECIFIED COPD TYPE (CMD): Primary | ICD-10-CM

## 2020-01-10 PROCEDURE — 99214 OFFICE O/P EST MOD 30 MIN: CPT | Performed by: INTERNAL MEDICINE

## 2020-01-10 RX ORDER — PREDNISONE 10 MG/1
TABLET ORAL
Qty: 30 TABLET | Refills: 0 | Status: SHIPPED | OUTPATIENT
Start: 2020-01-10 | End: 2020-02-18 | Stop reason: SDUPTHER

## 2020-01-17 ENCOUNTER — TELEPHONE (OUTPATIENT)
Dept: PULMONOLOGY | Age: 85
End: 2020-01-17

## 2020-01-17 DIAGNOSIS — J44.9 CHRONIC OBSTRUCTIVE PULMONARY DISEASE, UNSPECIFIED COPD TYPE (CMD): Primary | ICD-10-CM

## 2020-01-20 ENCOUNTER — TELEPHONE (OUTPATIENT)
Dept: PULMONOLOGY | Age: 85
End: 2020-01-20

## 2020-01-20 ENCOUNTER — APPOINTMENT (OUTPATIENT)
Dept: PULMONOLOGY | Age: 85
End: 2020-01-20

## 2020-01-22 ENCOUNTER — OFFICE VISIT (OUTPATIENT)
Dept: PULMONOLOGY | Age: 85
End: 2020-01-22

## 2020-01-22 DIAGNOSIS — F17.200 TOBACCO USE DISORDER: ICD-10-CM

## 2020-01-22 DIAGNOSIS — J81.1 PULMONARY CONGESTION AND HYPOSTASIS: ICD-10-CM

## 2020-01-22 DIAGNOSIS — J44.9 CHRONIC OBSTRUCTIVE PULMONARY DISEASE, UNSPECIFIED COPD TYPE (CMD): Primary | ICD-10-CM

## 2020-01-22 PROCEDURE — 94760 N-INVAS EAR/PLS OXIMETRY 1: CPT | Performed by: INTERNAL MEDICINE

## 2020-01-28 ENCOUNTER — TELEPHONE (OUTPATIENT)
Dept: UROLOGY | Age: 85
End: 2020-01-28

## 2020-01-28 RX ORDER — TOLTERODINE TARTRATE 2 MG/1
2 TABLET, EXTENDED RELEASE ORAL 2 TIMES DAILY
Qty: 180 TABLET | Refills: 3 | Status: SHIPPED | OUTPATIENT
Start: 2020-01-28 | End: 2020-04-22 | Stop reason: SDUPTHER

## 2020-01-30 ENCOUNTER — OFFICE VISIT (OUTPATIENT)
Dept: OPHTHALMOLOGY | Age: 85
End: 2020-01-30

## 2020-01-30 DIAGNOSIS — H40.1113 PRIMARY OPEN ANGLE GLAUCOMA (POAG) OF RIGHT EYE, SEVERE STAGE: Primary | ICD-10-CM

## 2020-01-30 DIAGNOSIS — H26.493 BILATERAL POSTERIOR CAPSULAR OPACIFICATION: ICD-10-CM

## 2020-01-30 DIAGNOSIS — H40.1121 PRIMARY OPEN ANGLE GLAUCOMA (POAG) OF LEFT EYE, MILD STAGE: ICD-10-CM

## 2020-01-30 PROCEDURE — 92133 CPTRZD OPH DX IMG PST SGM ON: CPT | Performed by: OPHTHALMOLOGY

## 2020-01-30 PROCEDURE — 99214 OFFICE O/P EST MOD 30 MIN: CPT | Performed by: OPHTHALMOLOGY

## 2020-02-04 ENCOUNTER — TELEPHONE (OUTPATIENT)
Dept: PULMONOLOGY | Age: 85
End: 2020-02-04

## 2020-02-04 DIAGNOSIS — J44.9 CHRONIC OBSTRUCTIVE PULMONARY DISEASE, UNSPECIFIED COPD TYPE (CMD): Primary | ICD-10-CM

## 2020-02-17 ENCOUNTER — TELEPHONE (OUTPATIENT)
Dept: PULMONOLOGY | Age: 85
End: 2020-02-17

## 2020-02-17 DIAGNOSIS — J44.9 CHRONIC OBSTRUCTIVE PULMONARY DISEASE, UNSPECIFIED COPD TYPE (CMD): ICD-10-CM

## 2020-02-18 RX ORDER — DOXYCYCLINE HYCLATE 100 MG/1
100 CAPSULE ORAL 2 TIMES DAILY
Qty: 10 CAPSULE | Refills: 0 | Status: SHIPPED | OUTPATIENT
Start: 2020-02-18 | End: 2020-02-23

## 2020-02-18 RX ORDER — PREDNISONE 10 MG/1
TABLET ORAL
Qty: 30 TABLET | Refills: 0 | Status: SHIPPED | OUTPATIENT
Start: 2020-02-18 | End: 2020-04-10 | Stop reason: ALTCHOICE

## 2020-03-17 ENCOUNTER — TELEPHONE (OUTPATIENT)
Dept: PULMONOLOGY | Age: 85
End: 2020-03-17

## 2020-03-17 RX ORDER — FLUTICASONE PROPIONATE AND SALMETEROL 250; 50 UG/1; UG/1
1 POWDER RESPIRATORY (INHALATION) 2 TIMES DAILY
Qty: 60 EACH | Refills: 11 | Status: SHIPPED | OUTPATIENT
Start: 2020-03-17 | End: 2020-03-17 | Stop reason: SDUPTHER

## 2020-03-17 RX ORDER — FLUTICASONE PROPIONATE AND SALMETEROL 250; 50 UG/1; UG/1
1 POWDER RESPIRATORY (INHALATION) 2 TIMES DAILY
Qty: 60 EACH | Refills: 11 | Status: SHIPPED | OUTPATIENT
Start: 2020-03-17 | End: 2021-03-15 | Stop reason: SDUPTHER

## 2020-04-09 ENCOUNTER — TELEPHONE (OUTPATIENT)
Dept: UROLOGY | Age: 85
End: 2020-04-09

## 2020-04-09 ENCOUNTER — TELEPHONE (OUTPATIENT)
Dept: PULMONOLOGY | Age: 85
End: 2020-04-09

## 2020-04-09 RX ORDER — DOXYCYCLINE HYCLATE 100 MG/1
100 CAPSULE ORAL 2 TIMES DAILY
Qty: 60 CAPSULE | Refills: 0 | Status: SHIPPED | OUTPATIENT
Start: 2020-04-09 | End: 2020-07-17 | Stop reason: SDUPTHER

## 2020-04-10 RX ORDER — PREDNISONE 10 MG/1
40 TABLET ORAL DAILY
Qty: 20 TABLET | Refills: 0 | Status: SHIPPED | OUTPATIENT
Start: 2020-04-10 | End: 2020-04-15

## 2020-04-17 ENCOUNTER — TELEPHONE (OUTPATIENT)
Dept: CARDIOLOGY | Age: 85
End: 2020-04-17

## 2020-04-22 ENCOUNTER — OFFICE VISIT (OUTPATIENT)
Dept: UROLOGY | Age: 85
End: 2020-04-22

## 2020-04-22 DIAGNOSIS — N41.1 CHRONIC PROSTATITIS: Primary | ICD-10-CM

## 2020-04-22 DIAGNOSIS — N39.41 URGE INCONTINENCE: ICD-10-CM

## 2020-04-22 PROCEDURE — 99442 TELEPHONE E&M BY PHYSICIAN EST PT NOT ORIG PREV 7 DAYS 11-20 MIN: CPT | Performed by: UROLOGY

## 2020-04-22 RX ORDER — FINASTERIDE 5 MG/1
5 TABLET, FILM COATED ORAL DAILY
Qty: 90 TABLET | Refills: 3 | Status: SHIPPED | OUTPATIENT
Start: 2020-04-22 | End: 2020-10-23 | Stop reason: SDUPTHER

## 2020-04-22 RX ORDER — TOLTERODINE TARTRATE 2 MG/1
2 TABLET, EXTENDED RELEASE ORAL 2 TIMES DAILY
Qty: 180 TABLET | Refills: 3 | Status: SHIPPED | OUTPATIENT
Start: 2020-04-22 | End: 2020-10-23 | Stop reason: SDUPTHER

## 2020-04-22 RX ORDER — DOXAZOSIN 8 MG/1
8 TABLET ORAL NIGHTLY
Qty: 90 TABLET | Refills: 3 | Status: SHIPPED | OUTPATIENT
Start: 2020-04-22 | End: 2020-10-23 | Stop reason: SDUPTHER

## 2020-07-07 RX ORDER — BUDESONIDE 0.5 MG/2ML
0.5 INHALANT ORAL 2 TIMES DAILY
Qty: 60 VIAL | Refills: 11 | Status: SHIPPED | OUTPATIENT
Start: 2020-07-07 | End: 2020-07-21 | Stop reason: ALTCHOICE

## 2020-07-07 RX ORDER — ALBUTEROL SULFATE 2.5 MG/3ML
2.5 SOLUTION RESPIRATORY (INHALATION) 4 TIMES DAILY
Qty: 360 ML | Refills: 11 | Status: SHIPPED | OUTPATIENT
Start: 2020-07-07 | End: 2020-07-21 | Stop reason: ALTCHOICE

## 2020-07-16 ENCOUNTER — TELEPHONE (OUTPATIENT)
Dept: PULMONOLOGY | Age: 85
End: 2020-07-16

## 2020-07-17 RX ORDER — PREDNISONE 10 MG/1
TABLET ORAL
Qty: 30 TABLET | Refills: 0 | Status: SHIPPED | OUTPATIENT
Start: 2020-07-17 | End: 2020-07-29

## 2020-07-17 RX ORDER — DOXYCYCLINE HYCLATE 100 MG/1
100 CAPSULE ORAL 2 TIMES DAILY
Qty: 10 CAPSULE | Refills: 0 | Status: SHIPPED | OUTPATIENT
Start: 2020-07-17 | End: 2020-07-22

## 2020-07-21 ENCOUNTER — OFFICE VISIT (OUTPATIENT)
Dept: PULMONOLOGY | Age: 85
End: 2020-07-21

## 2020-07-21 ENCOUNTER — IMAGING SERVICES (OUTPATIENT)
Dept: GENERAL RADIOLOGY | Age: 85
End: 2020-07-21
Attending: INTERNAL MEDICINE

## 2020-07-21 VITALS
TEMPERATURE: 98.4 F | WEIGHT: 184 LBS | BODY MASS INDEX: 27.89 KG/M2 | HEART RATE: 101 BPM | DIASTOLIC BLOOD PRESSURE: 66 MMHG | SYSTOLIC BLOOD PRESSURE: 110 MMHG | HEIGHT: 68 IN | RESPIRATION RATE: 18 BRPM | OXYGEN SATURATION: 94 %

## 2020-07-21 DIAGNOSIS — J44.9 CHRONIC OBSTRUCTIVE PULMONARY DISEASE, UNSPECIFIED COPD TYPE (CMD): ICD-10-CM

## 2020-07-21 DIAGNOSIS — J44.9 CHRONIC OBSTRUCTIVE PULMONARY DISEASE, UNSPECIFIED COPD TYPE (CMD): Primary | ICD-10-CM

## 2020-07-21 PROCEDURE — 99214 OFFICE O/P EST MOD 30 MIN: CPT | Performed by: INTERNAL MEDICINE

## 2020-07-21 PROCEDURE — 71046 X-RAY EXAM CHEST 2 VIEWS: CPT | Performed by: RADIOLOGY

## 2020-07-21 RX ORDER — IPRATROPIUM BROMIDE AND ALBUTEROL SULFATE 2.5; .5 MG/3ML; MG/3ML
3 SOLUTION RESPIRATORY (INHALATION) EVERY 6 HOURS PRN
Qty: 3 ML | Refills: 11 | Status: SHIPPED | OUTPATIENT
Start: 2020-07-21 | End: 2020-08-20 | Stop reason: SDUPTHER

## 2020-07-21 SDOH — HEALTH STABILITY: MENTAL HEALTH: HOW OFTEN DO YOU HAVE A DRINK CONTAINING ALCOHOL?: NEVER

## 2020-07-24 DIAGNOSIS — J44.9 CHRONIC OBSTRUCTIVE PULMONARY DISEASE, UNSPECIFIED COPD TYPE (CMD): Primary | ICD-10-CM

## 2020-08-17 ENCOUNTER — TELEPHONE (OUTPATIENT)
Dept: PULMONOLOGY | Age: 85
End: 2020-08-17

## 2020-08-19 ENCOUNTER — OFFICE VISIT (OUTPATIENT)
Dept: OPHTHALMOLOGY | Age: 85
End: 2020-08-19

## 2020-08-19 DIAGNOSIS — H40.1121 PRIMARY OPEN ANGLE GLAUCOMA (POAG) OF LEFT EYE, MILD STAGE: ICD-10-CM

## 2020-08-19 DIAGNOSIS — H40.1113 PRIMARY OPEN ANGLE GLAUCOMA (POAG) OF RIGHT EYE, SEVERE STAGE: Primary | ICD-10-CM

## 2020-08-19 PROCEDURE — X1094 NO CHARGE VISIT: HCPCS | Performed by: OPHTHALMOLOGY

## 2020-08-19 PROCEDURE — 99213 OFFICE O/P EST LOW 20 MIN: CPT | Performed by: OPHTHALMOLOGY

## 2020-08-19 PROCEDURE — 92083 EXTENDED VISUAL FIELD XM: CPT | Performed by: OPHTHALMOLOGY

## 2020-08-20 DIAGNOSIS — J44.9 CHRONIC OBSTRUCTIVE PULMONARY DISEASE, UNSPECIFIED COPD TYPE (CMD): ICD-10-CM

## 2020-08-20 RX ORDER — IPRATROPIUM BROMIDE AND ALBUTEROL SULFATE 2.5; .5 MG/3ML; MG/3ML
3 SOLUTION RESPIRATORY (INHALATION) EVERY 6 HOURS PRN
Qty: 360 ML | Refills: 11 | Status: SHIPPED | OUTPATIENT
Start: 2020-08-20 | End: 2021-05-24

## 2020-09-24 ENCOUNTER — TELEPHONE (OUTPATIENT)
Dept: OPHTHALMOLOGY | Age: 85
End: 2020-09-24

## 2020-09-24 RX ORDER — LATANOPROST 50 UG/ML
1 SOLUTION/ DROPS OPHTHALMIC NIGHTLY
Qty: 7.5 ML | Refills: 3 | Status: SHIPPED | OUTPATIENT
Start: 2020-09-24 | End: 2021-05-24 | Stop reason: SDUPTHER

## 2020-10-01 ENCOUNTER — EXTERNAL RECORD (OUTPATIENT)
Dept: HEALTH INFORMATION MANAGEMENT | Facility: OTHER | Age: 85
End: 2020-10-01

## 2020-10-23 ENCOUNTER — OFFICE VISIT (OUTPATIENT)
Dept: UROLOGY | Age: 85
End: 2020-10-23

## 2020-10-23 VITALS — HEIGHT: 68 IN | TEMPERATURE: 97.5 F | BODY MASS INDEX: 26.67 KG/M2 | WEIGHT: 176 LBS

## 2020-10-23 DIAGNOSIS — N13.8 BPH WITH OBSTRUCTION/LOWER URINARY TRACT SYMPTOMS: Primary | ICD-10-CM

## 2020-10-23 DIAGNOSIS — N40.1 BPH WITH OBSTRUCTION/LOWER URINARY TRACT SYMPTOMS: Primary | ICD-10-CM

## 2020-10-23 DIAGNOSIS — N39.41 URGE INCONTINENCE: ICD-10-CM

## 2020-10-23 LAB — BLDR SCAN MLS: 16

## 2020-10-23 PROCEDURE — 51798 US URINE CAPACITY MEASURE: CPT | Performed by: UROLOGY

## 2020-10-23 PROCEDURE — 99214 OFFICE O/P EST MOD 30 MIN: CPT | Performed by: UROLOGY

## 2020-10-23 RX ORDER — TOLTERODINE TARTRATE 2 MG/1
2 TABLET, EXTENDED RELEASE ORAL 2 TIMES DAILY
Qty: 180 TABLET | Refills: 3 | Status: SHIPPED | OUTPATIENT
Start: 2020-10-23 | End: 2022-02-10 | Stop reason: SDUPTHER

## 2020-10-23 RX ORDER — DOXAZOSIN 8 MG/1
8 TABLET ORAL NIGHTLY
Qty: 90 TABLET | Refills: 3 | Status: SHIPPED | OUTPATIENT
Start: 2020-10-23 | End: 2021-09-14 | Stop reason: SDUPTHER

## 2020-10-23 RX ORDER — FINASTERIDE 5 MG/1
5 TABLET, FILM COATED ORAL DAILY
Qty: 90 TABLET | Refills: 3 | Status: SHIPPED | OUTPATIENT
Start: 2020-10-23 | End: 2022-01-11 | Stop reason: SDUPTHER

## 2020-10-26 ENCOUNTER — TELEPHONE (OUTPATIENT)
Dept: PULMONOLOGY | Age: 85
End: 2020-10-26

## 2020-11-02 RX ORDER — ALBUTEROL SULFATE 90 UG/1
2 AEROSOL, METERED RESPIRATORY (INHALATION) EVERY 4 HOURS PRN
Qty: 1 INHALER | Refills: 11 | Status: SHIPPED | OUTPATIENT
Start: 2020-11-02 | End: 2022-05-02 | Stop reason: SDUPTHER

## 2020-11-09 ENCOUNTER — TELEPHONE (OUTPATIENT)
Dept: PULMONOLOGY | Age: 85
End: 2020-11-09

## 2020-11-10 RX ORDER — PREDNISONE 20 MG/1
40 TABLET ORAL DAILY
Qty: 10 TABLET | Refills: 0 | Status: SHIPPED | OUTPATIENT
Start: 2020-11-10 | End: 2020-11-15

## 2020-11-10 RX ORDER — DOXYCYCLINE HYCLATE 100 MG/1
100 CAPSULE ORAL 2 TIMES DAILY
Qty: 10 CAPSULE | Refills: 0 | Status: SHIPPED | OUTPATIENT
Start: 2020-11-10 | End: 2020-11-15

## 2020-11-11 ENCOUNTER — EXTERNAL RECORD (OUTPATIENT)
Dept: HEALTH INFORMATION MANAGEMENT | Facility: OTHER | Age: 85
End: 2020-11-11

## 2020-11-20 ENCOUNTER — TELEPHONE (OUTPATIENT)
Dept: PULMONOLOGY | Age: 85
End: 2020-11-20

## 2020-11-23 ENCOUNTER — OFFICE VISIT (OUTPATIENT)
Dept: PULMONOLOGY | Age: 85
End: 2020-11-23

## 2020-11-23 DIAGNOSIS — J44.9 CHRONIC OBSTRUCTIVE PULMONARY DISEASE, UNSPECIFIED COPD TYPE (CMD): ICD-10-CM

## 2020-11-23 PROCEDURE — 99214 OFFICE O/P EST MOD 30 MIN: CPT | Performed by: INTERNAL MEDICINE

## 2020-11-23 ASSESSMENT — PAIN SCALES - GENERAL: PAINLEVEL: 0

## 2021-01-20 ENCOUNTER — TELEPHONE (OUTPATIENT)
Dept: UROLOGY | Age: 86
End: 2021-01-20

## 2021-01-20 RX ORDER — DOXYCYCLINE HYCLATE 100 MG/1
100 CAPSULE ORAL 2 TIMES DAILY
Qty: 56 CAPSULE | Refills: 0 | Status: SHIPPED | OUTPATIENT
Start: 2021-01-20 | End: 2021-06-25 | Stop reason: ALTCHOICE

## 2021-02-04 ENCOUNTER — TELEPHONE (OUTPATIENT)
Dept: PULMONOLOGY | Age: 86
End: 2021-02-04

## 2021-02-05 RX ORDER — PREDNISONE 10 MG/1
TABLET ORAL
Qty: 30 TABLET | Refills: 0 | Status: SHIPPED | OUTPATIENT
Start: 2021-02-05 | End: 2021-02-17

## 2021-02-09 ENCOUNTER — TELEPHONE (OUTPATIENT)
Dept: PULMONOLOGY | Age: 86
End: 2021-02-09

## 2021-02-24 ENCOUNTER — OFFICE VISIT (OUTPATIENT)
Dept: OPHTHALMOLOGY | Age: 86
End: 2021-02-24

## 2021-02-24 DIAGNOSIS — H26.493 BILATERAL POSTERIOR CAPSULAR OPACIFICATION: ICD-10-CM

## 2021-02-24 DIAGNOSIS — H40.1121 PRIMARY OPEN ANGLE GLAUCOMA (POAG) OF LEFT EYE, MILD STAGE: ICD-10-CM

## 2021-02-24 DIAGNOSIS — H40.1113 PRIMARY OPEN ANGLE GLAUCOMA (POAG) OF RIGHT EYE, SEVERE STAGE: Primary | ICD-10-CM

## 2021-02-24 PROCEDURE — 99214 OFFICE O/P EST MOD 30 MIN: CPT | Performed by: OPHTHALMOLOGY

## 2021-02-24 PROCEDURE — 92133 CPTRZD OPH DX IMG PST SGM ON: CPT | Performed by: OPHTHALMOLOGY

## 2021-02-24 SDOH — HEALTH STABILITY: MENTAL HEALTH: HOW OFTEN DO YOU HAVE A DRINK CONTAINING ALCOHOL?: NEVER

## 2021-03-15 ENCOUNTER — TELEPHONE (OUTPATIENT)
Dept: PULMONOLOGY | Age: 86
End: 2021-03-15

## 2021-03-15 RX ORDER — FLUTICASONE PROPIONATE AND SALMETEROL 250; 50 UG/1; UG/1
1 POWDER RESPIRATORY (INHALATION) 2 TIMES DAILY
Qty: 60 EACH | Refills: 11 | Status: SHIPPED | OUTPATIENT
Start: 2021-03-15 | End: 2022-05-02 | Stop reason: SDUPTHER

## 2021-05-12 ENCOUNTER — TELEPHONE (OUTPATIENT)
Dept: FAMILY MEDICINE | Age: 86
End: 2021-05-12

## 2021-05-24 ENCOUNTER — OFFICE VISIT (OUTPATIENT)
Dept: PULMONOLOGY | Age: 86
End: 2021-05-24

## 2021-05-24 DIAGNOSIS — J44.9 CHRONIC OBSTRUCTIVE PULMONARY DISEASE, UNSPECIFIED COPD TYPE (CMD): ICD-10-CM

## 2021-05-24 DIAGNOSIS — J44.9 CHRONIC OBSTRUCTIVE PULMONARY DISEASE, UNSPECIFIED COPD TYPE (CMD): Primary | ICD-10-CM

## 2021-05-24 PROCEDURE — 99214 OFFICE O/P EST MOD 30 MIN: CPT | Performed by: INTERNAL MEDICINE

## 2021-05-24 RX ORDER — IPRATROPIUM BROMIDE AND ALBUTEROL SULFATE 2.5; .5 MG/3ML; MG/3ML
SOLUTION RESPIRATORY (INHALATION)
Qty: 360 ML | Refills: 11 | Status: SHIPPED | OUTPATIENT
Start: 2021-05-24 | End: 2022-03-24

## 2021-05-24 RX ORDER — LATANOPROST 50 UG/ML
1 SOLUTION/ DROPS OPHTHALMIC NIGHTLY
Qty: 7.5 ML | Refills: 3 | Status: SHIPPED | OUTPATIENT
Start: 2021-05-24 | End: 2022-05-06 | Stop reason: SDUPTHER

## 2021-05-24 RX ORDER — PREDNISONE 10 MG/1
10 TABLET ORAL DAILY
Qty: 100 TABLET | Refills: 3 | Status: SHIPPED | OUTPATIENT
Start: 2021-05-24 | End: 2021-09-01

## 2021-05-24 ASSESSMENT — PAIN SCALES - GENERAL: PAINLEVEL: 0

## 2021-05-25 VITALS
HEART RATE: 90 BPM | WEIGHT: 179.2 LBS | TEMPERATURE: 98.9 F | SYSTOLIC BLOOD PRESSURE: 132 MMHG | RESPIRATION RATE: 24 BRPM | SYSTOLIC BLOOD PRESSURE: 129 MMHG | TEMPERATURE: 96.5 F | RESPIRATION RATE: 22 BRPM | OXYGEN SATURATION: 97 % | DIASTOLIC BLOOD PRESSURE: 60 MMHG | DIASTOLIC BLOOD PRESSURE: 70 MMHG | OXYGEN SATURATION: 94 % | OXYGEN SATURATION: 94 % | HEIGHT: 68 IN | TEMPERATURE: 98.7 F | OXYGEN SATURATION: 92 % | TEMPERATURE: 98.7 F | TEMPERATURE: 98.5 F | HEART RATE: 92 BPM | WEIGHT: 183.6 LBS | WEIGHT: 193 LBS | RESPIRATION RATE: 24 BRPM | BODY MASS INDEX: 27.16 KG/M2 | HEART RATE: 98 BPM | HEART RATE: 103 BPM | SYSTOLIC BLOOD PRESSURE: 128 MMHG | DIASTOLIC BLOOD PRESSURE: 58 MMHG | RESPIRATION RATE: 18 BRPM | SYSTOLIC BLOOD PRESSURE: 110 MMHG | RESPIRATION RATE: 24 BRPM | SYSTOLIC BLOOD PRESSURE: 120 MMHG | HEART RATE: 109 BPM | HEIGHT: 68 IN | TEMPERATURE: 97 F | OXYGEN SATURATION: 94 % | HEIGHT: 68 IN | BODY MASS INDEX: 29.25 KG/M2 | DIASTOLIC BLOOD PRESSURE: 60 MMHG | RESPIRATION RATE: 16 BRPM | DIASTOLIC BLOOD PRESSURE: 68 MMHG | HEART RATE: 97 BPM | BODY MASS INDEX: 27.83 KG/M2 | OXYGEN SATURATION: 94 % | DIASTOLIC BLOOD PRESSURE: 68 MMHG | SYSTOLIC BLOOD PRESSURE: 117 MMHG

## 2021-06-03 ENCOUNTER — TELEPHONE (OUTPATIENT)
Dept: UROLOGY | Age: 86
End: 2021-06-03

## 2021-06-03 DIAGNOSIS — R30.0 BURNING WITH URINATION: ICD-10-CM

## 2021-06-03 DIAGNOSIS — R35.0 FREQUENT URINATION: ICD-10-CM

## 2021-06-03 DIAGNOSIS — R39.15 URGENCY OF URINATION: Primary | ICD-10-CM

## 2021-06-04 ENCOUNTER — LAB SERVICES (OUTPATIENT)
Dept: LAB | Age: 86
End: 2021-06-04

## 2021-06-04 DIAGNOSIS — R30.0 BURNING WITH URINATION: ICD-10-CM

## 2021-06-04 DIAGNOSIS — R39.15 URGENCY OF URINATION: ICD-10-CM

## 2021-06-04 DIAGNOSIS — R35.0 FREQUENT URINATION: ICD-10-CM

## 2021-06-04 LAB
BILIRUBIN URINE: NEGATIVE
BLOOD URINE: NEGATIVE
CLARITY: CLEAR
COLOR: YELLOW
GLUCOSE QUALITATIVE U: NEGATIVE
KETONES, URINE: NEGATIVE
LEUKOCYTE ESTERASE URINE: NEGATIVE
NITRITE URINE: NEGATIVE
PH URINE: 6 (ref 5–7)
SPECIFIC GRAVITY URINE: 1.01 (ref 1–1.03)
URINE PROTEIN, QUAL (DIPSTICK): NEGATIVE
UROBILINOGEN URINE: <2

## 2021-06-04 PROCEDURE — 81003 URINALYSIS AUTO W/O SCOPE: CPT | Performed by: UROLOGY

## 2021-06-07 ENCOUNTER — TELEPHONE (OUTPATIENT)
Dept: UROLOGY | Age: 86
End: 2021-06-07

## 2021-06-23 ENCOUNTER — WALK IN (OUTPATIENT)
Dept: URGENT CARE | Age: 86
End: 2021-06-23

## 2021-06-23 ENCOUNTER — LAB SERVICES (OUTPATIENT)
Dept: URGENT CARE | Age: 86
End: 2021-06-23

## 2021-06-23 VITALS
OXYGEN SATURATION: 96 % | RESPIRATION RATE: 24 BRPM | DIASTOLIC BLOOD PRESSURE: 74 MMHG | TEMPERATURE: 97.9 F | HEART RATE: 89 BPM | SYSTOLIC BLOOD PRESSURE: 151 MMHG

## 2021-06-23 DIAGNOSIS — J44.9 CHRONIC OBSTRUCTIVE PULMONARY DISEASE, UNSPECIFIED COPD TYPE (CMD): ICD-10-CM

## 2021-06-23 DIAGNOSIS — J44.9 CHRONIC OBSTRUCTIVE PULMONARY DISEASE, UNSPECIFIED COPD TYPE (CMD): Primary | ICD-10-CM

## 2021-06-23 DIAGNOSIS — M54.9 BACK PAIN, UNSPECIFIED BACK LOCATION, UNSPECIFIED BACK PAIN LATERALITY, UNSPECIFIED CHRONICITY: Primary | ICD-10-CM

## 2021-06-23 PROCEDURE — 99214 OFFICE O/P EST MOD 30 MIN: CPT | Performed by: INTERNAL MEDICINE

## 2021-06-23 PROCEDURE — U0005 INFEC AGEN DETEC AMPLI PROBE: HCPCS | Performed by: CLINICAL MEDICAL LABORATORY

## 2021-06-23 PROCEDURE — U0003 INFECTIOUS AGENT DETECTION BY NUCLEIC ACID (DNA OR RNA); SEVERE ACUTE RESPIRATORY SYNDROME CORONAVIRUS 2 (SARS-COV-2) (CORONAVIRUS DISEASE [COVID-19]), AMPLIFIED PROBE TECHNIQUE, MAKING USE OF HIGH THROUGHPUT TECHNOLOGIES AS DESCRIBED BY CMS-2020-01-R: HCPCS | Performed by: CLINICAL MEDICAL LABORATORY

## 2021-06-23 RX ORDER — HYDROCODONE BITARTRATE AND ACETAMINOPHEN 5; 325 MG/1; MG/1
1 TABLET ORAL EVERY 6 HOURS PRN
Qty: 15 TABLET | Refills: 0 | Status: SHIPPED | OUTPATIENT
Start: 2021-06-23 | End: 2021-06-28

## 2021-06-23 ASSESSMENT — PAIN SCALES - GENERAL: PAINLEVEL: 6

## 2021-06-24 LAB
SARS-COV-2 RNA RESP QL NAA+PROBE: NOT DETECTED
SERVICE CMNT-IMP: NORMAL
SERVICE CMNT-IMP: NORMAL

## 2021-06-25 ENCOUNTER — OFFICE VISIT (OUTPATIENT)
Dept: PULMONOLOGY | Age: 86
End: 2021-06-25
Attending: INTERNAL MEDICINE

## 2021-06-25 VITALS — HEIGHT: 68 IN | RESPIRATION RATE: 16 BRPM | WEIGHT: 185.6 LBS | BODY MASS INDEX: 28.13 KG/M2

## 2021-06-25 DIAGNOSIS — J44.9 CHRONIC OBSTRUCTIVE PULMONARY DISEASE, UNSPECIFIED COPD TYPE (CMD): Primary | ICD-10-CM

## 2021-06-25 PROCEDURE — 94727 GAS DIL/WSHOT DETER LNG VOL: CPT | Performed by: INTERNAL MEDICINE

## 2021-06-25 PROCEDURE — 94729 DIFFUSING CAPACITY: CPT | Performed by: INTERNAL MEDICINE

## 2021-06-25 PROCEDURE — 94060 EVALUATION OF WHEEZING: CPT | Performed by: INTERNAL MEDICINE

## 2021-06-25 ASSESSMENT — PAIN SCALES - GENERAL: PAINLEVEL: 4

## 2021-07-01 ENCOUNTER — TELEPHONE (OUTPATIENT)
Dept: OPHTHALMOLOGY | Age: 86
End: 2021-07-01

## 2021-07-14 ENCOUNTER — APPOINTMENT (OUTPATIENT)
Dept: OPHTHALMOLOGY | Age: 86
End: 2021-07-14

## 2021-08-06 ENCOUNTER — OFFICE VISIT (OUTPATIENT)
Dept: OPHTHALMOLOGY | Age: 86
End: 2021-08-06

## 2021-08-06 DIAGNOSIS — H01.001 BLEPHARITIS OF UPPER EYELIDS OF BOTH EYES, UNSPECIFIED TYPE: ICD-10-CM

## 2021-08-06 DIAGNOSIS — H26.491 POSTERIOR CAPSULAR OPACIFICATION OF RIGHT EYE, OBSCURING VISION: ICD-10-CM

## 2021-08-06 DIAGNOSIS — H40.1133 PRIMARY OPEN ANGLE GLAUCOMA (POAG) OF BOTH EYES, SEVERE STAGE: Primary | ICD-10-CM

## 2021-08-06 DIAGNOSIS — H01.004 BLEPHARITIS OF UPPER EYELIDS OF BOTH EYES, UNSPECIFIED TYPE: ICD-10-CM

## 2021-08-06 DIAGNOSIS — H26.492 POSTERIOR CAPSULAR OPACIFICATION OF LEFT EYE, OBSCURING VISION: ICD-10-CM

## 2021-08-06 PROCEDURE — 99214 OFFICE O/P EST MOD 30 MIN: CPT | Performed by: OPHTHALMOLOGY

## 2021-08-06 PROCEDURE — 92133 CPTRZD OPH DX IMG PST SGM ON: CPT | Performed by: OPHTHALMOLOGY

## 2021-09-13 ENCOUNTER — OFFICE VISIT (OUTPATIENT)
Dept: OPHTHALMOLOGY | Age: 86
End: 2021-09-13

## 2021-09-13 DIAGNOSIS — H04.123 DRY EYES: ICD-10-CM

## 2021-09-13 DIAGNOSIS — H40.1133 PRIMARY OPEN ANGLE GLAUCOMA (POAG) OF BOTH EYES, SEVERE STAGE: Primary | ICD-10-CM

## 2021-09-13 DIAGNOSIS — H01.001 BLEPHARITIS OF UPPER EYELIDS OF BOTH EYES, UNSPECIFIED TYPE: ICD-10-CM

## 2021-09-13 DIAGNOSIS — H01.004 BLEPHARITIS OF UPPER EYELIDS OF BOTH EYES, UNSPECIFIED TYPE: ICD-10-CM

## 2021-09-13 PROCEDURE — 99213 OFFICE O/P EST LOW 20 MIN: CPT | Performed by: OPHTHALMOLOGY

## 2021-09-14 RX ORDER — DOXAZOSIN 8 MG/1
8 TABLET ORAL NIGHTLY
Qty: 90 TABLET | Refills: 0 | Status: SHIPPED | OUTPATIENT
Start: 2021-09-14 | End: 2022-03-30 | Stop reason: SDUPTHER

## 2021-09-19 ENCOUNTER — WALK IN (OUTPATIENT)
Dept: URGENT CARE | Age: 86
End: 2021-09-19

## 2021-09-19 VITALS
OXYGEN SATURATION: 94 % | RESPIRATION RATE: 22 BRPM | SYSTOLIC BLOOD PRESSURE: 142 MMHG | DIASTOLIC BLOOD PRESSURE: 58 MMHG | TEMPERATURE: 98.4 F | HEART RATE: 92 BPM

## 2021-09-19 DIAGNOSIS — S01.01XA LACERATION OF SCALP, INITIAL ENCOUNTER: Primary | ICD-10-CM

## 2021-09-19 PROCEDURE — 99214 OFFICE O/P EST MOD 30 MIN: CPT | Performed by: EMERGENCY MEDICINE

## 2021-09-19 RX ORDER — PREDNISONE 10 MG/1
10 TABLET ORAL
COMMUNITY
End: 2022-01-10 | Stop reason: ALTCHOICE

## 2021-09-19 RX ORDER — HYDROCODONE BITARTRATE AND ACETAMINOPHEN 5; 325 MG/1; MG/1
1 TABLET ORAL EVERY 6 HOURS PRN
Qty: 20 TABLET | Refills: 0 | Status: SHIPPED | OUTPATIENT
Start: 2021-09-19 | End: 2022-08-30 | Stop reason: ALTCHOICE

## 2021-09-19 RX ORDER — FUROSEMIDE 20 MG/1
1 TABLET ORAL DAILY
COMMUNITY
Start: 2018-11-01 | End: 2021-10-04 | Stop reason: SDUPTHER

## 2021-09-19 ASSESSMENT — PAIN SCALES - GENERAL: PAINLEVEL: 2

## 2021-09-20 ENCOUNTER — TELEPHONE (OUTPATIENT)
Dept: PULMONOLOGY | Age: 86
End: 2021-09-20

## 2021-09-24 ENCOUNTER — APPOINTMENT (OUTPATIENT)
Dept: PULMONOLOGY | Age: 86
End: 2021-09-24

## 2021-10-04 ENCOUNTER — OFFICE VISIT (OUTPATIENT)
Dept: PULMONOLOGY | Age: 86
End: 2021-10-04

## 2021-10-04 ENCOUNTER — IMAGING SERVICES (OUTPATIENT)
Dept: GENERAL RADIOLOGY | Age: 86
End: 2021-10-04
Attending: INTERNAL MEDICINE

## 2021-10-04 VITALS
OXYGEN SATURATION: 94 % | WEIGHT: 188.8 LBS | RESPIRATION RATE: 18 BRPM | SYSTOLIC BLOOD PRESSURE: 128 MMHG | HEART RATE: 104 BPM | DIASTOLIC BLOOD PRESSURE: 70 MMHG | BODY MASS INDEX: 28.61 KG/M2 | HEIGHT: 68 IN | TEMPERATURE: 97.3 F

## 2021-10-04 DIAGNOSIS — J44.9 CHRONIC OBSTRUCTIVE PULMONARY DISEASE, UNSPECIFIED COPD TYPE (CMD): ICD-10-CM

## 2021-10-04 DIAGNOSIS — J44.9 CHRONIC OBSTRUCTIVE PULMONARY DISEASE, UNSPECIFIED COPD TYPE (CMD): Primary | ICD-10-CM

## 2021-10-04 DIAGNOSIS — Z23 NEED FOR IMMUNIZATION AGAINST INFLUENZA: ICD-10-CM

## 2021-10-04 PROCEDURE — 90686 IIV4 VACC NO PRSV 0.5 ML IM: CPT

## 2021-10-04 PROCEDURE — 99214 OFFICE O/P EST MOD 30 MIN: CPT | Performed by: INTERNAL MEDICINE

## 2021-10-04 PROCEDURE — G0008 ADMIN INFLUENZA VIRUS VAC: HCPCS

## 2021-10-04 PROCEDURE — 71046 X-RAY EXAM CHEST 2 VIEWS: CPT | Performed by: RADIOLOGY

## 2021-10-04 ASSESSMENT — PAIN SCALES - GENERAL: PAINLEVEL: 0

## 2021-10-08 ENCOUNTER — APPOINTMENT (OUTPATIENT)
Dept: PULMONOLOGY | Age: 86
End: 2021-10-08

## 2021-10-12 ENCOUNTER — TELEPHONE (OUTPATIENT)
Dept: PULMONOLOGY | Age: 86
End: 2021-10-12

## 2021-10-21 ENCOUNTER — TELEPHONE (OUTPATIENT)
Dept: PULMONOLOGY | Age: 86
End: 2021-10-21

## 2021-10-26 ENCOUNTER — TELEPHONE (OUTPATIENT)
Dept: PULMONOLOGY | Age: 86
End: 2021-10-26

## 2021-10-26 RX ORDER — PREDNISONE 10 MG/1
TABLET ORAL
Qty: 30 TABLET | Refills: 0 | Status: SHIPPED | OUTPATIENT
Start: 2021-10-26 | End: 2021-11-07

## 2021-10-26 RX ORDER — AZITHROMYCIN 250 MG/1
TABLET, FILM COATED ORAL
Qty: 6 TABLET | Refills: 0 | Status: SHIPPED | OUTPATIENT
Start: 2021-10-26 | End: 2021-10-31

## 2021-10-28 ENCOUNTER — APPOINTMENT (OUTPATIENT)
Dept: UROLOGY | Age: 86
End: 2021-10-28

## 2021-12-09 ENCOUNTER — TELEPHONE (OUTPATIENT)
Dept: OTHER | Age: 86
End: 2021-12-09

## 2022-01-03 ENCOUNTER — TELEPHONE (OUTPATIENT)
Dept: PULMONOLOGY | Age: 87
End: 2022-01-03

## 2022-01-04 RX ORDER — PREDNISONE 10 MG/1
TABLET ORAL
Qty: 30 TABLET | Refills: 0 | Status: SHIPPED | OUTPATIENT
Start: 2022-01-04 | End: 2022-01-16

## 2022-01-04 RX ORDER — AZITHROMYCIN 250 MG/1
TABLET, FILM COATED ORAL
Qty: 6 TABLET | Refills: 0 | Status: SHIPPED | OUTPATIENT
Start: 2022-01-04 | End: 2022-01-09

## 2022-01-10 ENCOUNTER — TELEPHONE (OUTPATIENT)
Dept: PULMONOLOGY | Age: 87
End: 2022-01-10

## 2022-01-10 ENCOUNTER — WALK IN (OUTPATIENT)
Dept: URGENT CARE | Age: 87
End: 2022-01-10

## 2022-01-10 VITALS
SYSTOLIC BLOOD PRESSURE: 104 MMHG | HEART RATE: 99 BPM | TEMPERATURE: 98.6 F | RESPIRATION RATE: 28 BRPM | DIASTOLIC BLOOD PRESSURE: 56 MMHG | OXYGEN SATURATION: 93 %

## 2022-01-10 DIAGNOSIS — R06.02 SHORTNESS OF BREATH: Primary | ICD-10-CM

## 2022-01-10 PROCEDURE — 99213 OFFICE O/P EST LOW 20 MIN: CPT | Performed by: FAMILY MEDICINE

## 2022-01-10 RX ORDER — FINASTERIDE 5 MG/1
5 TABLET, FILM COATED ORAL DAILY
Qty: 90 TABLET | Refills: 3 | OUTPATIENT
Start: 2022-01-10

## 2022-01-10 ASSESSMENT — PAIN SCALES - GENERAL: PAINLEVEL: 0

## 2022-01-11 ENCOUNTER — DOCUMENTATION (OUTPATIENT)
Dept: PULMONOLOGY | Age: 87
End: 2022-01-11

## 2022-01-11 RX ORDER — FINASTERIDE 5 MG/1
5 TABLET, FILM COATED ORAL DAILY
Qty: 90 TABLET | Refills: 0 | Status: SHIPPED | OUTPATIENT
Start: 2022-01-11 | End: 2022-03-30 | Stop reason: SDUPTHER

## 2022-01-13 ENCOUNTER — V-VISIT (OUTPATIENT)
Dept: PULMONOLOGY | Age: 87
End: 2022-01-13

## 2022-01-13 DIAGNOSIS — U07.1 COVID-19 VIRUS DETECTED: ICD-10-CM

## 2022-01-13 DIAGNOSIS — J44.9 CHRONIC OBSTRUCTIVE PULMONARY DISEASE, UNSPECIFIED COPD TYPE (CMD): Primary | ICD-10-CM

## 2022-01-13 PROCEDURE — 99214 OFFICE O/P EST MOD 30 MIN: CPT | Performed by: INTERNAL MEDICINE

## 2022-01-13 RX ORDER — PREDNISONE 10 MG/1
TABLET ORAL
Qty: 30 TABLET | Refills: 0 | Status: SHIPPED | OUTPATIENT
Start: 2022-01-13 | End: 2022-03-21 | Stop reason: ALTCHOICE

## 2022-01-13 RX ORDER — DOXYCYCLINE HYCLATE 100 MG/1
100 CAPSULE ORAL 2 TIMES DAILY
Qty: 20 CAPSULE | Refills: 0 | Status: SHIPPED | OUTPATIENT
Start: 2022-01-13 | End: 2022-01-20

## 2022-01-19 ENCOUNTER — APPOINTMENT (OUTPATIENT)
Dept: OPHTHALMOLOGY | Age: 87
End: 2022-01-19

## 2022-01-27 ENCOUNTER — TELEPHONE (OUTPATIENT)
Dept: PULMONOLOGY | Age: 87
End: 2022-01-27

## 2022-01-29 ENCOUNTER — TELEPHONE (OUTPATIENT)
Dept: URGENT CARE | Age: 87
End: 2022-01-29

## 2022-02-01 ENCOUNTER — IMAGING SERVICES (OUTPATIENT)
Dept: OTHER | Age: 87
End: 2022-02-01

## 2022-02-01 ENCOUNTER — TELEPHONE (OUTPATIENT)
Dept: PULMONOLOGY | Age: 87
End: 2022-02-01

## 2022-02-01 ENCOUNTER — IMAGING SERVICES (OUTPATIENT)
Dept: GENERAL RADIOLOGY | Age: 87
End: 2022-02-01
Attending: INTERNAL MEDICINE

## 2022-02-01 ENCOUNTER — V-VISIT (OUTPATIENT)
Dept: PULMONOLOGY | Age: 87
End: 2022-02-01

## 2022-02-01 ENCOUNTER — EXTERNAL RECORD (OUTPATIENT)
Dept: HEALTH INFORMATION MANAGEMENT | Facility: OTHER | Age: 87
End: 2022-02-01

## 2022-02-01 ENCOUNTER — DOCUMENTATION (OUTPATIENT)
Dept: PULMONOLOGY | Age: 87
End: 2022-02-01

## 2022-02-01 ENCOUNTER — LAB SERVICES (OUTPATIENT)
Dept: LAB | Age: 87
End: 2022-02-01

## 2022-02-01 DIAGNOSIS — U09.9 POST-COVID CHRONIC DYSPNEA: ICD-10-CM

## 2022-02-01 DIAGNOSIS — J44.9 CHRONIC OBSTRUCTIVE PULMONARY DISEASE, UNSPECIFIED COPD TYPE (CMD): Primary | ICD-10-CM

## 2022-02-01 DIAGNOSIS — R06.09 POST-COVID CHRONIC DYSPNEA: ICD-10-CM

## 2022-02-01 DIAGNOSIS — J44.9 CHRONIC OBSTRUCTIVE PULMONARY DISEASE, UNSPECIFIED COPD TYPE (CMD): ICD-10-CM

## 2022-02-01 DIAGNOSIS — R06.02 SHORTNESS OF BREATH: ICD-10-CM

## 2022-02-01 LAB — D DIMER PPP IA.DDU-MCNC: 1190 NG/ML (ref 0–400)

## 2022-02-01 PROCEDURE — 71046 X-RAY EXAM CHEST 2 VIEWS: CPT | Performed by: RADIOLOGY

## 2022-02-01 PROCEDURE — 99215 OFFICE O/P EST HI 40 MIN: CPT | Performed by: INTERNAL MEDICINE

## 2022-02-01 PROCEDURE — 36415 COLL VENOUS BLD VENIPUNCTURE: CPT | Performed by: INTERNAL MEDICINE

## 2022-02-01 PROCEDURE — 85379 FIBRIN DEGRADATION QUANT: CPT | Performed by: INTERNAL MEDICINE

## 2022-02-01 RX ORDER — PREDNISONE 10 MG/1
10 TABLET ORAL DAILY
Qty: 30 TABLET | Refills: 3 | Status: SHIPPED | OUTPATIENT
Start: 2022-02-01 | End: 2022-03-03 | Stop reason: SDUPTHER

## 2022-02-01 RX ORDER — PREDNISONE 10 MG/1
TABLET ORAL
Qty: 30 TABLET | Refills: 0 | Status: SHIPPED | OUTPATIENT
Start: 2022-02-01 | End: 2022-03-21 | Stop reason: ALTCHOICE

## 2022-02-02 ENCOUNTER — IMAGING SERVICES (OUTPATIENT)
Dept: OTHER | Age: 87
End: 2022-02-02

## 2022-02-02 PROCEDURE — 99223 1ST HOSP IP/OBS HIGH 75: CPT | Performed by: HOSPITALIST

## 2022-02-02 PROCEDURE — 93306 TTE W/DOPPLER COMPLETE: CPT | Performed by: INTERNAL MEDICINE

## 2022-02-02 PROCEDURE — 99223 1ST HOSP IP/OBS HIGH 75: CPT | Performed by: INTERNAL MEDICINE

## 2022-02-03 ENCOUNTER — EXTERNAL RECORD (OUTPATIENT)
Dept: HEALTH INFORMATION MANAGEMENT | Facility: OTHER | Age: 87
End: 2022-02-03

## 2022-02-03 ENCOUNTER — TELEPHONE (OUTPATIENT)
Dept: PULMONOLOGY | Age: 87
End: 2022-02-03

## 2022-02-03 PROCEDURE — 99239 HOSP IP/OBS DSCHRG MGMT >30: CPT | Performed by: HOSPITALIST

## 2022-02-06 ENCOUNTER — NURSE TRIAGE (OUTPATIENT)
Dept: SCHEDULING | Age: 87
End: 2022-02-06

## 2022-02-07 ENCOUNTER — TELEPHONE (OUTPATIENT)
Dept: PULMONOLOGY | Age: 87
End: 2022-02-07

## 2022-02-10 RX ORDER — TOLTERODINE TARTRATE 2 MG/1
TABLET, EXTENDED RELEASE ORAL
Qty: 180 TABLET | OUTPATIENT
Start: 2022-02-10

## 2022-02-10 RX ORDER — TOLTERODINE TARTRATE 2 MG/1
2 TABLET, EXTENDED RELEASE ORAL 2 TIMES DAILY
Qty: 60 TABLET | Refills: 0 | Status: SHIPPED | OUTPATIENT
Start: 2022-02-10 | End: 2022-03-10 | Stop reason: SDUPTHER

## 2022-02-16 ENCOUNTER — TELEPHONE (OUTPATIENT)
Dept: PULMONOLOGY | Age: 87
End: 2022-02-16

## 2022-02-16 ENCOUNTER — OFFICE VISIT (OUTPATIENT)
Dept: UROLOGY | Age: 87
End: 2022-02-16

## 2022-02-16 DIAGNOSIS — N13.8 BPH WITH OBSTRUCTION/LOWER URINARY TRACT SYMPTOMS: Primary | ICD-10-CM

## 2022-02-16 DIAGNOSIS — N40.1 BPH WITH OBSTRUCTION/LOWER URINARY TRACT SYMPTOMS: Primary | ICD-10-CM

## 2022-02-24 ENCOUNTER — TELEPHONE (OUTPATIENT)
Dept: PULMONOLOGY | Age: 87
End: 2022-02-24

## 2022-03-03 ENCOUNTER — OFFICE VISIT (OUTPATIENT)
Dept: PULMONOLOGY | Age: 87
End: 2022-03-03

## 2022-03-03 ENCOUNTER — IMAGING SERVICES (OUTPATIENT)
Dept: GENERAL RADIOLOGY | Age: 87
End: 2022-03-03
Attending: INTERNAL MEDICINE

## 2022-03-03 VITALS
DIASTOLIC BLOOD PRESSURE: 64 MMHG | TEMPERATURE: 98.3 F | OXYGEN SATURATION: 96 % | WEIGHT: 191.25 LBS | BODY MASS INDEX: 28.99 KG/M2 | RESPIRATION RATE: 18 BRPM | HEART RATE: 106 BPM | HEIGHT: 68 IN | SYSTOLIC BLOOD PRESSURE: 120 MMHG

## 2022-03-03 DIAGNOSIS — J44.9 CHRONIC OBSTRUCTIVE PULMONARY DISEASE, UNSPECIFIED COPD TYPE (CMD): ICD-10-CM

## 2022-03-03 PROCEDURE — 71046 X-RAY EXAM CHEST 2 VIEWS: CPT | Performed by: RADIOLOGY

## 2022-03-03 PROCEDURE — 99215 OFFICE O/P EST HI 40 MIN: CPT | Performed by: INTERNAL MEDICINE

## 2022-03-03 RX ORDER — PREDNISONE 10 MG/1
10 TABLET ORAL DAILY
Qty: 30 TABLET | Refills: 3 | Status: SHIPPED | OUTPATIENT
Start: 2022-03-03 | End: 2022-05-27 | Stop reason: ALTCHOICE

## 2022-03-03 ASSESSMENT — PAIN SCALES - GENERAL: PAINLEVEL: 0

## 2022-03-08 ENCOUNTER — TELEPHONE (OUTPATIENT)
Dept: PULMONOLOGY | Age: 87
End: 2022-03-08

## 2022-03-08 DIAGNOSIS — J44.9 CHRONIC OBSTRUCTIVE PULMONARY DISEASE, UNSPECIFIED COPD TYPE (CMD): Primary | ICD-10-CM

## 2022-03-09 ENCOUNTER — APPOINTMENT (OUTPATIENT)
Dept: UROLOGY | Age: 87
End: 2022-03-09

## 2022-03-10 RX ORDER — TOLTERODINE TARTRATE 2 MG/1
TABLET, EXTENDED RELEASE ORAL
Qty: 180 TABLET | OUTPATIENT
Start: 2022-03-10

## 2022-03-10 RX ORDER — TOLTERODINE TARTRATE 2 MG/1
2 TABLET, EXTENDED RELEASE ORAL 2 TIMES DAILY
Qty: 60 TABLET | Refills: 0 | Status: SHIPPED | OUTPATIENT
Start: 2022-03-10 | End: 2022-03-30 | Stop reason: SDUPTHER

## 2022-03-21 ENCOUNTER — OFFICE VISIT (OUTPATIENT)
Dept: INTERNAL MEDICINE | Age: 87
End: 2022-03-21

## 2022-03-21 VITALS
SYSTOLIC BLOOD PRESSURE: 128 MMHG | DIASTOLIC BLOOD PRESSURE: 66 MMHG | TEMPERATURE: 99.1 F | RESPIRATION RATE: 16 BRPM | WEIGHT: 182 LBS | OXYGEN SATURATION: 95 % | BODY MASS INDEX: 27.58 KG/M2 | HEIGHT: 68 IN | HEART RATE: 104 BPM

## 2022-03-21 DIAGNOSIS — G89.29 CHRONIC RIGHT-SIDED LOW BACK PAIN WITH RIGHT-SIDED SCIATICA: Primary | ICD-10-CM

## 2022-03-21 DIAGNOSIS — M51.36 DDD (DEGENERATIVE DISC DISEASE), LUMBAR: ICD-10-CM

## 2022-03-21 DIAGNOSIS — I71.40 ABDOMINAL AORTIC ANEURYSM (AAA) WITHOUT RUPTURE (CMD): ICD-10-CM

## 2022-03-21 DIAGNOSIS — M54.41 CHRONIC RIGHT-SIDED LOW BACK PAIN WITH RIGHT-SIDED SCIATICA: Primary | ICD-10-CM

## 2022-03-21 DIAGNOSIS — M48.061 SPINAL STENOSIS OF LUMBAR REGION WITHOUT NEUROGENIC CLAUDICATION: ICD-10-CM

## 2022-03-21 PROCEDURE — 99214 OFFICE O/P EST MOD 30 MIN: CPT | Performed by: INTERNAL MEDICINE

## 2022-03-21 RX ORDER — GABAPENTIN 100 MG/1
100 CAPSULE ORAL NIGHTLY
Qty: 90 CAPSULE | Refills: 0 | Status: SHIPPED | OUTPATIENT
Start: 2022-03-21 | End: 2022-08-30 | Stop reason: ALTCHOICE

## 2022-03-21 ASSESSMENT — COGNITIVE AND FUNCTIONAL STATUS - GENERAL
BECAUSE OF A PHYSICAL, MENTAL, OR EMOTIONAL CONDITION, DO YOU HAVE DIFFICULTY DOING ERRANDS ALONE: NO
DO YOU HAVE SERIOUS DIFFICULTY WALKING OR CLIMBING STAIRS: YES
DO YOU HAVE DIFFICULTY DRESSING OR BATHING: NO
BECAUSE OF A PHYSICAL, MENTAL, OR EMOTIONAL CONDITION, DO YOU HAVE SERIOUS DIFFICULTY CONCENTRATING, REMEMBERING OR MAKING DECISIONS: NO

## 2022-03-21 ASSESSMENT — PATIENT HEALTH QUESTIONNAIRE - PHQ9
1. LITTLE INTEREST OR PLEASURE IN DOING THINGS: NOT AT ALL
SUM OF ALL RESPONSES TO PHQ9 QUESTIONS 1 AND 2: 0
SUM OF ALL RESPONSES TO PHQ9 QUESTIONS 1 AND 2: 0
2. FEELING DOWN, DEPRESSED OR HOPELESS: NOT AT ALL
CLINICAL INTERPRETATION OF PHQ2 SCORE: NO FURTHER SCREENING NEEDED

## 2022-03-23 ENCOUNTER — APPOINTMENT (OUTPATIENT)
Dept: UROLOGY | Age: 87
End: 2022-03-23

## 2022-03-23 ENCOUNTER — EXTERNAL RECORD (OUTPATIENT)
Dept: HEALTH INFORMATION MANAGEMENT | Facility: OTHER | Age: 87
End: 2022-03-23

## 2022-03-23 LAB — RETINOPATHY PRESENT (RTP): NORMAL

## 2022-03-24 DIAGNOSIS — J44.9 CHRONIC OBSTRUCTIVE PULMONARY DISEASE, UNSPECIFIED COPD TYPE (CMD): ICD-10-CM

## 2022-03-24 RX ORDER — IPRATROPIUM BROMIDE AND ALBUTEROL SULFATE 2.5; .5 MG/3ML; MG/3ML
SOLUTION RESPIRATORY (INHALATION)
Qty: 360 ML | Refills: 11 | Status: SHIPPED | OUTPATIENT
Start: 2022-03-24 | End: 2023-01-23

## 2022-03-30 ENCOUNTER — OFFICE VISIT (OUTPATIENT)
Dept: UROLOGY | Age: 87
End: 2022-03-30

## 2022-03-30 VITALS — TEMPERATURE: 95.8 F | BODY MASS INDEX: 27.28 KG/M2 | WEIGHT: 180 LBS | HEIGHT: 68 IN

## 2022-03-30 DIAGNOSIS — N40.1 BPH WITH OBSTRUCTION/LOWER URINARY TRACT SYMPTOMS: Primary | ICD-10-CM

## 2022-03-30 DIAGNOSIS — N13.8 BPH WITH OBSTRUCTION/LOWER URINARY TRACT SYMPTOMS: Primary | ICD-10-CM

## 2022-03-30 DIAGNOSIS — N39.41 URGE INCONTINENCE: ICD-10-CM

## 2022-03-30 LAB — BLDR SCAN MLS: 82

## 2022-03-30 PROCEDURE — 99214 OFFICE O/P EST MOD 30 MIN: CPT | Performed by: UROLOGY

## 2022-03-30 PROCEDURE — 51798 US URINE CAPACITY MEASURE: CPT | Performed by: UROLOGY

## 2022-03-30 RX ORDER — TOLTERODINE TARTRATE 2 MG/1
2 TABLET, EXTENDED RELEASE ORAL 2 TIMES DAILY
Qty: 180 TABLET | Refills: 3 | Status: SHIPPED | OUTPATIENT
Start: 2022-03-30 | End: 2023-03-23

## 2022-03-30 RX ORDER — FUROSEMIDE 20 MG/1
20 TABLET ORAL DAILY
Qty: 30 TABLET | Refills: 0 | OUTPATIENT
Start: 2022-03-30 | End: 2022-04-29

## 2022-03-30 RX ORDER — DOXAZOSIN 8 MG/1
8 TABLET ORAL NIGHTLY
Qty: 90 TABLET | Refills: 3 | Status: SHIPPED | OUTPATIENT
Start: 2022-03-30 | End: 2023-03-23

## 2022-03-30 RX ORDER — FINASTERIDE 5 MG/1
5 TABLET, FILM COATED ORAL DAILY
Qty: 90 TABLET | Refills: 3 | Status: SHIPPED | OUTPATIENT
Start: 2022-03-30 | End: 2023-03-23

## 2022-04-04 DIAGNOSIS — J44.9 CHRONIC OBSTRUCTIVE PULMONARY DISEASE, UNSPECIFIED COPD TYPE (CMD): ICD-10-CM

## 2022-04-04 RX ORDER — FUROSEMIDE 20 MG/1
TABLET ORAL
Qty: 30 TABLET | Refills: 3 | Status: SHIPPED | OUTPATIENT
Start: 2022-04-04 | End: 2022-04-18 | Stop reason: DRUGHIGH

## 2022-04-04 RX ORDER — PREDNISONE 10 MG/1
10 TABLET ORAL DAILY
Qty: 30 TABLET | Refills: 3 | OUTPATIENT
Start: 2022-04-04

## 2022-04-05 ENCOUNTER — APPOINTMENT (OUTPATIENT)
Dept: PULMONOLOGY | Age: 87
End: 2022-04-05

## 2022-04-12 ENCOUNTER — TELEPHONE (OUTPATIENT)
Dept: PULMONOLOGY | Age: 87
End: 2022-04-12

## 2022-04-18 ENCOUNTER — LAB SERVICES (OUTPATIENT)
Dept: LAB | Age: 87
End: 2022-04-18

## 2022-04-18 ENCOUNTER — OFFICE VISIT (OUTPATIENT)
Dept: PULMONOLOGY | Age: 87
End: 2022-04-18

## 2022-04-18 VITALS
SYSTOLIC BLOOD PRESSURE: 116 MMHG | OXYGEN SATURATION: 96 % | BODY MASS INDEX: 29.09 KG/M2 | TEMPERATURE: 97.6 F | DIASTOLIC BLOOD PRESSURE: 70 MMHG | WEIGHT: 191.91 LBS | HEIGHT: 68 IN | RESPIRATION RATE: 14 BRPM | HEART RATE: 107 BPM

## 2022-04-18 DIAGNOSIS — J44.9 CHRONIC OBSTRUCTIVE PULMONARY DISEASE, UNSPECIFIED COPD TYPE (CMD): ICD-10-CM

## 2022-04-18 DIAGNOSIS — J44.9 CHRONIC OBSTRUCTIVE PULMONARY DISEASE, UNSPECIFIED COPD TYPE (CMD): Primary | ICD-10-CM

## 2022-04-18 PROCEDURE — 36415 COLL VENOUS BLD VENIPUNCTURE: CPT | Performed by: INTERNAL MEDICINE

## 2022-04-18 PROCEDURE — 80048 BASIC METABOLIC PNL TOTAL CA: CPT | Performed by: INTERNAL MEDICINE

## 2022-04-18 PROCEDURE — 99214 OFFICE O/P EST MOD 30 MIN: CPT | Performed by: INTERNAL MEDICINE

## 2022-04-18 RX ORDER — FUROSEMIDE 20 MG/1
20 TABLET ORAL DAILY
Qty: 60 TABLET | Refills: 3 | Status: SHIPPED | OUTPATIENT
Start: 2022-04-18 | End: 2023-05-01 | Stop reason: SDUPTHER

## 2022-04-18 ASSESSMENT — PAIN SCALES - GENERAL: PAINLEVEL: 0

## 2022-04-19 LAB
BUN SERPL-MCNC: 15 MG/DL (ref 6–27)
CALCIUM SERPL-MCNC: 8.8 MG/DL (ref 8.6–10.6)
CHLORIDE SERPL-SCNC: 103 MMOL/L (ref 96–107)
CO2 SERPL-SCNC: 29 MMOL/L (ref 22–32)
CREAT SERPL-MCNC: 0.7 MG/DL (ref 0.6–1.6)
GFR SERPL CREATININE-BSD FRML MDRD: >60 ML/MIN/{1.73M2}
GFR SERPL CREATININE-BSD FRML MDRD: >60 ML/MIN/{1.73M2}
GLUCOSE SERPL-MCNC: 85 MG/DL (ref 70–200)
POTASSIUM SERPL-SCNC: 4.8 MMOL/L (ref 3.5–5.3)
SODIUM SERPL-SCNC: 138 MMOL/L (ref 136–146)

## 2022-04-20 ENCOUNTER — TELEPHONE (OUTPATIENT)
Dept: PULMONOLOGY | Age: 87
End: 2022-04-20

## 2022-04-20 DIAGNOSIS — U09.9 POST-COVID CHRONIC DYSPNEA: ICD-10-CM

## 2022-04-20 DIAGNOSIS — R06.09 POST-COVID CHRONIC DYSPNEA: ICD-10-CM

## 2022-04-20 DIAGNOSIS — J44.9 CHRONIC OBSTRUCTIVE PULMONARY DISEASE, UNSPECIFIED COPD TYPE (CMD): Primary | ICD-10-CM

## 2022-05-02 ENCOUNTER — TELEPHONE (OUTPATIENT)
Dept: PULMONOLOGY | Age: 87
End: 2022-05-02

## 2022-05-02 ENCOUNTER — TELEPHONE (OUTPATIENT)
Dept: INTERNAL MEDICINE | Age: 87
End: 2022-05-02

## 2022-05-02 ENCOUNTER — TELEPHONE (OUTPATIENT)
Dept: CARDIOLOGY | Age: 87
End: 2022-05-02

## 2022-05-02 RX ORDER — FLUTICASONE PROPIONATE AND SALMETEROL 250; 50 UG/1; UG/1
1 POWDER RESPIRATORY (INHALATION) 2 TIMES DAILY
Qty: 60 EACH | Refills: 1 | Status: SHIPPED | OUTPATIENT
Start: 2022-05-02 | End: 2022-05-03 | Stop reason: SDUPTHER

## 2022-05-02 RX ORDER — ALBUTEROL SULFATE 90 UG/1
2 AEROSOL, METERED RESPIRATORY (INHALATION) EVERY 4 HOURS PRN
Qty: 1 EACH | Refills: 1 | Status: SHIPPED | OUTPATIENT
Start: 2022-05-02

## 2022-05-03 RX ORDER — IPRATROPIUM BROMIDE AND ALBUTEROL 20; 100 UG/1; UG/1
1 SPRAY, METERED RESPIRATORY (INHALATION) EVERY 4 HOURS PRN
Qty: 4 G | Refills: 11 | Status: SHIPPED | OUTPATIENT
Start: 2022-05-03 | End: 2023-05-01 | Stop reason: SDUPTHER

## 2022-05-03 RX ORDER — FLUTICASONE PROPIONATE AND SALMETEROL 250; 50 UG/1; UG/1
1 POWDER RESPIRATORY (INHALATION) 2 TIMES DAILY
Qty: 60 EACH | Refills: 11 | Status: SHIPPED | OUTPATIENT
Start: 2022-05-03 | End: 2023-05-01 | Stop reason: SDUPTHER

## 2022-05-06 ENCOUNTER — OFFICE VISIT (OUTPATIENT)
Dept: OPHTHALMOLOGY | Age: 87
End: 2022-05-06

## 2022-05-06 DIAGNOSIS — H40.1113 PRIMARY OPEN ANGLE GLAUCOMA (POAG) OF RIGHT EYE, SEVERE STAGE: Primary | ICD-10-CM

## 2022-05-06 DIAGNOSIS — Z96.1 PSEUDOPHAKIA OF BOTH EYES: ICD-10-CM

## 2022-05-06 PROCEDURE — 92012 INTRM OPH EXAM EST PATIENT: CPT | Performed by: OPHTHALMOLOGY

## 2022-05-06 RX ORDER — LATANOPROST 50 UG/ML
1 SOLUTION/ DROPS OPHTHALMIC NIGHTLY
Qty: 7.5 ML | Refills: 3 | Status: SHIPPED | OUTPATIENT
Start: 2022-05-06 | End: 2023-07-07 | Stop reason: SDUPTHER

## 2022-05-06 ASSESSMENT — VISUAL ACUITY
OS_PH_CC: NO IMPROVEMENT
OD_PH_SC: NO IMPROVEMENT
OD_SC: 20/60
CORRECTION_TYPE: GLASSES
METHOD: SNELLEN - LINEAR
OD_CC: CF
OD_PH_CC: NO IMPROVEMENT
OS_CC: 20/40
OD_SC+: -2

## 2022-05-06 ASSESSMENT — TONOMETRY
OD_IOP_MMHG: 14
IOP_METHOD: APPLANATION
OS_IOP_MMHG: 15

## 2022-05-13 ASSESSMENT — PACHYMETRY
OS_CT(UM): 566
OD_CT(UM): 548

## 2022-05-13 ASSESSMENT — EXTERNAL EXAM - RIGHT EYE: OD_EXAM: NORMAL

## 2022-05-13 ASSESSMENT — EXTERNAL EXAM - LEFT EYE: OS_EXAM: NORMAL

## 2022-05-13 ASSESSMENT — SLIT LAMP EXAM - LIDS
COMMENTS: NORMAL
COMMENTS: NORMAL

## 2022-05-27 ENCOUNTER — WALK IN (OUTPATIENT)
Dept: URGENT CARE | Age: 87
End: 2022-05-27

## 2022-05-27 ENCOUNTER — TELEPHONE (OUTPATIENT)
Dept: PULMONOLOGY | Age: 87
End: 2022-05-27

## 2022-05-27 ENCOUNTER — IMAGING SERVICES (OUTPATIENT)
Dept: GENERAL RADIOLOGY | Age: 87
End: 2022-05-27
Attending: FAMILY MEDICINE

## 2022-05-27 VITALS
SYSTOLIC BLOOD PRESSURE: 132 MMHG | DIASTOLIC BLOOD PRESSURE: 64 MMHG | RESPIRATION RATE: 24 BRPM | TEMPERATURE: 98.5 F | OXYGEN SATURATION: 97 % | HEART RATE: 116 BPM

## 2022-05-27 DIAGNOSIS — J44.1 COPD EXACERBATION (CMD): Primary | ICD-10-CM

## 2022-05-27 DIAGNOSIS — Z20.822 SUSPECTED COVID-19 VIRUS INFECTION: ICD-10-CM

## 2022-05-27 DIAGNOSIS — R06.02 SHORTNESS OF BREATH: ICD-10-CM

## 2022-05-27 LAB
INTERNAL PROCEDURAL CONTROLS ACCEPTABLE: YES
SARS-COV+SARS-COV-2 AG RESP QL IA.RAPID: NOT DETECTED
TEST LOT EXPIRATION DATE: NORMAL
TEST LOT NUMBER: NORMAL

## 2022-05-27 PROCEDURE — 87426 SARSCOV CORONAVIRUS AG IA: CPT | Performed by: FAMILY MEDICINE

## 2022-05-27 PROCEDURE — 71046 X-RAY EXAM CHEST 2 VIEWS: CPT | Performed by: RADIOLOGY

## 2022-05-27 PROCEDURE — 99215 OFFICE O/P EST HI 40 MIN: CPT | Performed by: FAMILY MEDICINE

## 2022-05-27 RX ORDER — PREDNISONE 20 MG/1
40 TABLET ORAL DAILY
Qty: 10 TABLET | Refills: 0 | Status: SHIPPED | OUTPATIENT
Start: 2022-05-27 | End: 2022-06-01

## 2022-05-27 RX ORDER — AZITHROMYCIN 250 MG/1
TABLET, FILM COATED ORAL
Qty: 6 TABLET | Refills: 0 | Status: SHIPPED | OUTPATIENT
Start: 2022-05-27 | End: 2022-06-01

## 2022-05-27 ASSESSMENT — ENCOUNTER SYMPTOMS
WHEEZING: 0
CHILLS: 0
SINUS PAIN: 1
ABDOMINAL PAIN: 0
RHINORRHEA: 1
DIARRHEA: 0
SINUS PRESSURE: 1
VOMITING: 0
NAUSEA: 0
CONSTIPATION: 0
COUGH: 0
FEVER: 0
SHORTNESS OF BREATH: 1
HEADACHES: 0
FATIGUE: 0
SORE THROAT: 0

## 2022-05-27 ASSESSMENT — COPD QUESTIONNAIRES: COPD: 1

## 2022-05-27 ASSESSMENT — PAIN SCALES - GENERAL: PAINLEVEL: 0

## 2022-05-31 ENCOUNTER — CLINICAL ABSTRACT (OUTPATIENT)
Dept: HEALTH INFORMATION MANAGEMENT | Facility: OTHER | Age: 87
End: 2022-05-31

## 2022-06-04 ENCOUNTER — HOSPITAL ENCOUNTER (INPATIENT)
Facility: HOSPITAL | Age: 87
LOS: 2 days | Discharge: LEFT BEFORE DISCHARGE | End: 2022-06-06
Attending: STUDENT IN AN ORGANIZED HEALTH CARE EDUCATION/TRAINING PROGRAM | Admitting: HOSPITALIST
Payer: MEDICARE

## 2022-06-04 ENCOUNTER — APPOINTMENT (OUTPATIENT)
Dept: GENERAL RADIOLOGY | Facility: HOSPITAL | Age: 87
End: 2022-06-04
Attending: STUDENT IN AN ORGANIZED HEALTH CARE EDUCATION/TRAINING PROGRAM
Payer: MEDICARE

## 2022-06-04 DIAGNOSIS — I50.9 ACUTE ON CHRONIC CONGESTIVE HEART FAILURE, UNSPECIFIED HEART FAILURE TYPE (HCC): ICD-10-CM

## 2022-06-04 DIAGNOSIS — J44.1 COPD EXACERBATION (HCC): ICD-10-CM

## 2022-06-04 DIAGNOSIS — R06.00 DYSPNEA, UNSPECIFIED TYPE: Primary | ICD-10-CM

## 2022-06-04 DIAGNOSIS — R09.02 HYPOXIA: ICD-10-CM

## 2022-06-04 DIAGNOSIS — J18.9 COMMUNITY ACQUIRED PNEUMONIA, UNSPECIFIED LATERALITY: ICD-10-CM

## 2022-06-04 LAB
ADENOVIRUS PCR:: NOT DETECTED
ALBUMIN SERPL-MCNC: 2.8 G/DL (ref 3.4–5)
ALBUMIN/GLOB SERPL: 0.6 {RATIO} (ref 1–2)
ALP LIVER SERPL-CCNC: 100 U/L
ALT SERPL-CCNC: 13 U/L
ANION GAP SERPL CALC-SCNC: 1 MMOL/L (ref 0–18)
APTT PPP: 32.3 SECONDS (ref 23.3–35.6)
AST SERPL-CCNC: 8 U/L (ref 15–37)
B PARAPERT DNA SPEC QL NAA+PROBE: NOT DETECTED
B PERT DNA SPEC QL NAA+PROBE: NOT DETECTED
BASOPHILS # BLD AUTO: 0.08 X10(3) UL (ref 0–0.2)
BASOPHILS NFR BLD AUTO: 0.5 %
BILIRUB SERPL-MCNC: 0.4 MG/DL (ref 0.1–2)
BILIRUB UR QL STRIP.AUTO: NEGATIVE
BUN BLD-MCNC: 12 MG/DL (ref 7–18)
C PNEUM DNA SPEC QL NAA+PROBE: NOT DETECTED
CALCIUM BLD-MCNC: 8.7 MG/DL (ref 8.5–10.1)
CHLORIDE SERPL-SCNC: 100 MMOL/L (ref 98–112)
CLARITY UR REFRACT.AUTO: CLEAR
CO2 SERPL-SCNC: 35 MMOL/L (ref 21–32)
COLOR UR AUTO: COLORLESS
CORONAVIRUS 229E PCR:: NOT DETECTED
CORONAVIRUS HKU1 PCR:: NOT DETECTED
CORONAVIRUS NL63 PCR:: NOT DETECTED
CORONAVIRUS OC43 PCR:: NOT DETECTED
CREAT BLD-MCNC: 0.91 MG/DL
EOSINOPHIL # BLD AUTO: 0.02 X10(3) UL (ref 0–0.7)
EOSINOPHIL NFR BLD AUTO: 0.1 %
ERYTHROCYTE [DISTWIDTH] IN BLOOD BY AUTOMATED COUNT: 12.9 %
FLUAV RNA SPEC QL NAA+PROBE: NOT DETECTED
FLUBV RNA SPEC QL NAA+PROBE: NOT DETECTED
GLOBULIN PLAS-MCNC: 4.7 G/DL (ref 2.8–4.4)
GLUCOSE BLD-MCNC: 161 MG/DL (ref 70–99)
GLUCOSE UR STRIP.AUTO-MCNC: NEGATIVE MG/DL
HCT VFR BLD AUTO: 43 %
HGB BLD-MCNC: 13.7 G/DL
HYALINE CASTS #/AREA URNS AUTO: PRESENT /LPF
IMM GRANULOCYTES # BLD AUTO: 0.43 X10(3) UL (ref 0–1)
IMM GRANULOCYTES NFR BLD: 2.8 %
INR BLD: 1.02 (ref 0.8–1.2)
KETONES UR STRIP.AUTO-MCNC: NEGATIVE MG/DL
LEUKOCYTE ESTERASE UR QL STRIP.AUTO: NEGATIVE
LYMPHOCYTES # BLD AUTO: 1.03 X10(3) UL (ref 1–4)
LYMPHOCYTES NFR BLD AUTO: 6.7 %
MCH RBC QN AUTO: 30.9 PG (ref 26–34)
MCHC RBC AUTO-ENTMCNC: 31.9 G/DL (ref 31–37)
MCV RBC AUTO: 97.1 FL
METAPNEUMOVIRUS PCR:: NOT DETECTED
MONOCYTES # BLD AUTO: 0.62 X10(3) UL (ref 0.1–1)
MONOCYTES NFR BLD AUTO: 4 %
MYCOPLASMA PNEUMONIA PCR:: NOT DETECTED
NEUTROPHILS # BLD AUTO: 13.22 X10 (3) UL (ref 1.5–7.7)
NEUTROPHILS # BLD AUTO: 13.22 X10(3) UL (ref 1.5–7.7)
NEUTROPHILS NFR BLD AUTO: 85.9 %
NITRITE UR QL STRIP.AUTO: NEGATIVE
NT-PROBNP SERPL-MCNC: 310 PG/ML (ref ?–450)
OSMOLALITY SERPL CALC.SUM OF ELEC: 285 MOSM/KG (ref 275–295)
PARAINFLUENZA 1 PCR:: NOT DETECTED
PARAINFLUENZA 2 PCR:: NOT DETECTED
PARAINFLUENZA 3 PCR:: NOT DETECTED
PARAINFLUENZA 4 PCR:: NOT DETECTED
PH UR STRIP.AUTO: 6 [PH] (ref 5–8)
PLATELET # BLD AUTO: 335 10(3)UL (ref 150–450)
POTASSIUM SERPL-SCNC: 4.6 MMOL/L (ref 3.5–5.1)
PROCALCITONIN SERPL-MCNC: <0.05 NG/ML (ref ?–0.16)
PROT SERPL-MCNC: 7.5 G/DL (ref 6.4–8.2)
PROT UR STRIP.AUTO-MCNC: NEGATIVE MG/DL
PROTHROMBIN TIME: 13.4 SECONDS (ref 11.6–14.8)
RBC # BLD AUTO: 4.43 X10(6)UL
RHINOVIRUS/ENTERO PCR:: DETECTED
RSV RNA SPEC QL NAA+PROBE: NOT DETECTED
SARS-COV-2 RNA NPH QL NAA+NON-PROBE: DETECTED
SARS-COV-2 RNA RESP QL NAA+PROBE: NOT DETECTED
SODIUM SERPL-SCNC: 136 MMOL/L (ref 136–145)
SP GR UR STRIP.AUTO: 1 (ref 1–1.03)
TROPONIN I HIGH SENSITIVITY: 9 NG/L
UROBILINOGEN UR STRIP.AUTO-MCNC: <2 MG/DL
WBC # BLD AUTO: 15.4 X10(3) UL (ref 4–11)

## 2022-06-04 PROCEDURE — 3E0333Z INTRODUCTION OF ANTI-INFLAMMATORY INTO PERIPHERAL VEIN, PERCUTANEOUS APPROACH: ICD-10-PCS | Performed by: HOSPITALIST

## 2022-06-04 PROCEDURE — 71045 X-RAY EXAM CHEST 1 VIEW: CPT | Performed by: STUDENT IN AN ORGANIZED HEALTH CARE EDUCATION/TRAINING PROGRAM

## 2022-06-04 PROCEDURE — 99223 1ST HOSP IP/OBS HIGH 75: CPT | Performed by: INTERNAL MEDICINE

## 2022-06-04 RX ORDER — TOLTERODINE TARTRATE 2 MG/1
2 TABLET, EXTENDED RELEASE ORAL 2 TIMES DAILY
COMMUNITY

## 2022-06-04 RX ORDER — FUROSEMIDE 20 MG/1
20 TABLET ORAL 2 TIMES DAILY
Status: ON HOLD | COMMUNITY
End: 2022-06-06

## 2022-06-04 RX ORDER — MELATONIN
3 NIGHTLY PRN
Status: DISCONTINUED | OUTPATIENT
Start: 2022-06-04 | End: 2022-06-06

## 2022-06-04 RX ORDER — ASPIRIN 81 MG/1
81 TABLET ORAL DAILY
Status: DISCONTINUED | OUTPATIENT
Start: 2022-06-05 | End: 2022-06-06

## 2022-06-04 RX ORDER — HEPARIN SODIUM 5000 [USP'U]/ML
5000 INJECTION, SOLUTION INTRAVENOUS; SUBCUTANEOUS EVERY 8 HOURS SCHEDULED
Status: DISCONTINUED | OUTPATIENT
Start: 2022-06-04 | End: 2022-06-06

## 2022-06-04 RX ORDER — ONDANSETRON 2 MG/ML
4 INJECTION INTRAMUSCULAR; INTRAVENOUS EVERY 6 HOURS PRN
Status: DISCONTINUED | OUTPATIENT
Start: 2022-06-04 | End: 2022-06-06

## 2022-06-04 RX ORDER — ASPIRIN 81 MG/1
81 TABLET ORAL DAILY
COMMUNITY

## 2022-06-04 RX ORDER — BISACODYL 10 MG
10 SUPPOSITORY, RECTAL RECTAL
Status: DISCONTINUED | OUTPATIENT
Start: 2022-06-04 | End: 2022-06-06

## 2022-06-04 RX ORDER — POLYETHYLENE GLYCOL 3350 17 G/17G
17 POWDER, FOR SOLUTION ORAL DAILY PRN
Status: DISCONTINUED | OUTPATIENT
Start: 2022-06-04 | End: 2022-06-06

## 2022-06-04 RX ORDER — GUAIFENESIN 600 MG
600 TABLET, EXTENDED RELEASE 12 HR ORAL 2 TIMES DAILY
Status: DISCONTINUED | OUTPATIENT
Start: 2022-06-04 | End: 2022-06-05

## 2022-06-04 RX ORDER — DOXAZOSIN MESYLATE 1 MG/1
1 TABLET ORAL NIGHTLY
COMMUNITY

## 2022-06-04 RX ORDER — METOCLOPRAMIDE HYDROCHLORIDE 5 MG/ML
10 INJECTION INTRAMUSCULAR; INTRAVENOUS EVERY 8 HOURS PRN
Status: DISCONTINUED | OUTPATIENT
Start: 2022-06-04 | End: 2022-06-06

## 2022-06-04 RX ORDER — ALBUTEROL SULFATE 90 UG/1
4 AEROSOL, METERED RESPIRATORY (INHALATION) EVERY 4 HOURS PRN
Status: DISCONTINUED | OUTPATIENT
Start: 2022-06-04 | End: 2022-06-06

## 2022-06-04 RX ORDER — SENNOSIDES 8.6 MG
17.2 TABLET ORAL NIGHTLY PRN
Status: DISCONTINUED | OUTPATIENT
Start: 2022-06-04 | End: 2022-06-06

## 2022-06-04 RX ORDER — SODIUM PHOSPHATE, DIBASIC AND SODIUM PHOSPHATE, MONOBASIC 7; 19 G/133ML; G/133ML
1 ENEMA RECTAL ONCE AS NEEDED
Status: DISCONTINUED | OUTPATIENT
Start: 2022-06-04 | End: 2022-06-06

## 2022-06-04 RX ORDER — PREDNISONE 10 MG/1
10 TABLET ORAL DAILY
Status: DISCONTINUED | OUTPATIENT
Start: 2022-06-05 | End: 2022-06-06

## 2022-06-04 RX ORDER — METHYLPREDNISOLONE SODIUM SUCCINATE 125 MG/2ML
125 INJECTION, POWDER, LYOPHILIZED, FOR SOLUTION INTRAMUSCULAR; INTRAVENOUS ONCE
Status: COMPLETED | OUTPATIENT
Start: 2022-06-04 | End: 2022-06-04

## 2022-06-04 RX ORDER — IPRATROPIUM BROMIDE AND ALBUTEROL SULFATE 2.5; .5 MG/3ML; MG/3ML
6 SOLUTION RESPIRATORY (INHALATION) ONCE
Status: COMPLETED | OUTPATIENT
Start: 2022-06-04 | End: 2022-06-04

## 2022-06-04 RX ORDER — TERAZOSIN 1 MG/1
1 CAPSULE ORAL NIGHTLY
Status: DISCONTINUED | OUTPATIENT
Start: 2022-06-05 | End: 2022-06-06

## 2022-06-04 RX ORDER — PREDNISONE 10 MG/1
10 TABLET ORAL DAILY
COMMUNITY

## 2022-06-04 RX ORDER — ALBUTEROL SULFATE 90 UG/1
2 AEROSOL, METERED RESPIRATORY (INHALATION) 4 TIMES DAILY
Status: DISCONTINUED | OUTPATIENT
Start: 2022-06-05 | End: 2022-06-06

## 2022-06-04 RX ORDER — OXYBUTYNIN CHLORIDE 5 MG/1
5 TABLET ORAL 2 TIMES DAILY
Status: DISCONTINUED | OUTPATIENT
Start: 2022-06-05 | End: 2022-06-06

## 2022-06-04 RX ORDER — ACETAMINOPHEN 500 MG
1000 TABLET ORAL EVERY 8 HOURS PRN
Status: DISCONTINUED | OUTPATIENT
Start: 2022-06-04 | End: 2022-06-06

## 2022-06-04 RX ORDER — FINASTERIDE 5 MG/1
5 TABLET, FILM COATED ORAL DAILY
Status: DISCONTINUED | OUTPATIENT
Start: 2022-06-05 | End: 2022-06-06

## 2022-06-04 RX ORDER — NICOTINE 21 MG/24HR
1 PATCH, TRANSDERMAL 24 HOURS TRANSDERMAL DAILY
Status: DISCONTINUED | OUTPATIENT
Start: 2022-06-05 | End: 2022-06-06

## 2022-06-04 RX ORDER — FINASTERIDE 5 MG/1
5 TABLET, FILM COATED ORAL DAILY
COMMUNITY

## 2022-06-04 RX ORDER — FUROSEMIDE 10 MG/ML
20 INJECTION INTRAMUSCULAR; INTRAVENOUS ONCE
Status: COMPLETED | OUTPATIENT
Start: 2022-06-04 | End: 2022-06-04

## 2022-06-04 NOTE — ED INITIAL ASSESSMENT (HPI)
A&Ox3 patient p/w c/o SALDANA    Patient normally on 4L NC, reports desatting to \"low 80s\" when exerting self \"even if I were to just walk down the hallway\".     +BLE pitting edema, also reports having a productive cough x2 days    Denies any cp/n/v/d/c/f/LH/vision changes/urinary sx at this time  RR even/NL, speaking in full clear sentences

## 2022-06-04 NOTE — ED QUICK NOTES
Orders for admission, patient is aware of plan and ready to go upstairs. Any questions, please call ED RN Olinda Hines at extension [phone]. Patient Covid vaccination status: Unvaccinated     COVID Test Ordered in ED: Rapid SARS-CoV-2 by PCR    COVID Suspicion at Admission: No risk with negative rapid    Running Infusions:  None    Mental Status/LOC at time of transport: AOx4    Other pertinent information: Pt wears oxygen at home as needed, 2L NC at baseline.  Pt on 2L here, titrating for SPO2 >90 and RR <22  CIWA score: N/A   NIH score:  N/A

## 2022-06-05 LAB
ALBUMIN SERPL-MCNC: 2.6 G/DL (ref 3.4–5)
ALBUMIN/GLOB SERPL: 0.6 {RATIO} (ref 1–2)
ALP LIVER SERPL-CCNC: 87 U/L
ALT SERPL-CCNC: 10 U/L
ANION GAP SERPL CALC-SCNC: 2 MMOL/L (ref 0–18)
AST SERPL-CCNC: 5 U/L (ref 15–37)
ATRIAL RATE: 99 BPM
BASOPHILS # BLD AUTO: 0.09 X10(3) UL (ref 0–0.2)
BASOPHILS NFR BLD AUTO: 0.6 %
BILIRUB SERPL-MCNC: 0.3 MG/DL (ref 0.1–2)
BUN BLD-MCNC: 13 MG/DL (ref 7–18)
CALCIUM BLD-MCNC: 8.8 MG/DL (ref 8.5–10.1)
CHLORIDE SERPL-SCNC: 100 MMOL/L (ref 98–112)
CO2 SERPL-SCNC: 33 MMOL/L (ref 21–32)
CREAT BLD-MCNC: 0.8 MG/DL
CRP SERPL-MCNC: 10.5 MG/DL (ref ?–0.3)
CRP SERPL-MCNC: 10.6 MG/DL (ref ?–0.3)
D DIMER PPP FEU-MCNC: 0.95 UG/ML FEU (ref ?–0.85)
DEPRECATED HBV CORE AB SER IA-ACNC: 343.8 NG/ML
EOSINOPHIL # BLD AUTO: 0 X10(3) UL (ref 0–0.7)
EOSINOPHIL NFR BLD AUTO: 0 %
ERYTHROCYTE [DISTWIDTH] IN BLOOD BY AUTOMATED COUNT: 12.8 %
GLOBULIN PLAS-MCNC: 4.7 G/DL (ref 2.8–4.4)
GLUCOSE BLD-MCNC: 147 MG/DL (ref 70–99)
HCT VFR BLD AUTO: 41.3 %
HGB BLD-MCNC: 13.4 G/DL
IMM GRANULOCYTES # BLD AUTO: 0.42 X10(3) UL (ref 0–1)
IMM GRANULOCYTES NFR BLD: 3 %
LDH SERPL L TO P-CCNC: 130 U/L
LYMPHOCYTES # BLD AUTO: 1.15 X10(3) UL (ref 1–4)
LYMPHOCYTES NFR BLD AUTO: 8.1 %
MCH RBC QN AUTO: 31 PG (ref 26–34)
MCHC RBC AUTO-ENTMCNC: 32.4 G/DL (ref 31–37)
MCV RBC AUTO: 95.6 FL
MONOCYTES # BLD AUTO: 0.37 X10(3) UL (ref 0.1–1)
MONOCYTES NFR BLD AUTO: 2.6 %
NEUTROPHILS # BLD AUTO: 12.19 X10 (3) UL (ref 1.5–7.7)
NEUTROPHILS # BLD AUTO: 12.19 X10(3) UL (ref 1.5–7.7)
NEUTROPHILS NFR BLD AUTO: 85.7 %
OSMOLALITY SERPL CALC.SUM OF ELEC: 283 MOSM/KG (ref 275–295)
P AXIS: 75 DEGREES
P-R INTERVAL: 112 MS
PLATELET # BLD AUTO: 339 10(3)UL (ref 150–450)
POTASSIUM SERPL-SCNC: 4.3 MMOL/L (ref 3.5–5.1)
PROT SERPL-MCNC: 7.3 G/DL (ref 6.4–8.2)
Q-T INTERVAL: 328 MS
QRS DURATION: 90 MS
QTC CALCULATION (BEZET): 420 MS
R AXIS: 40 DEGREES
RBC # BLD AUTO: 4.32 X10(6)UL
SODIUM SERPL-SCNC: 135 MMOL/L (ref 136–145)
T AXIS: 61 DEGREES
VENTRICULAR RATE: 99 BPM
WBC # BLD AUTO: 14.2 X10(3) UL (ref 4–11)

## 2022-06-05 PROCEDURE — 99233 SBSQ HOSP IP/OBS HIGH 50: CPT | Performed by: HOSPITALIST

## 2022-06-05 PROCEDURE — XW033E5 INTRODUCTION OF REMDESIVIR ANTI-INFECTIVE INTO PERIPHERAL VEIN, PERCUTANEOUS APPROACH, NEW TECHNOLOGY GROUP 5: ICD-10-PCS | Performed by: HOSPITALIST

## 2022-06-05 RX ORDER — LATANOPROST 50 UG/ML
1 SOLUTION/ DROPS OPHTHALMIC NIGHTLY
Status: DISCONTINUED | OUTPATIENT
Start: 2022-06-05 | End: 2022-06-06

## 2022-06-05 RX ORDER — FUROSEMIDE 20 MG/1
20 TABLET ORAL 2 TIMES DAILY
Status: DISCONTINUED | OUTPATIENT
Start: 2022-06-06 | End: 2022-06-06

## 2022-06-05 RX ORDER — FUROSEMIDE 10 MG/ML
40 INJECTION INTRAMUSCULAR; INTRAVENOUS ONCE
Status: COMPLETED | OUTPATIENT
Start: 2022-06-05 | End: 2022-06-05

## 2022-06-05 NOTE — ED QUICK NOTES
Azithro hung before rocephin because rocephin is not verified by pharmacy. Rocephin and azithro are not compatible.  Rocephin will need to be hung on floor

## 2022-06-05 NOTE — PLAN OF CARE
6/5 a/ox4, denies any pain, nor sob, on 1L/nc, sat=93-96% up ad carey to bathroom,remdesivir given as ordered. Lasix iv given as ordered. Refused mucinex.       Problem: Patient/Family Goals  Goal: Patient/Family Long Term Goal  Description: Patient's Long Term Goal: discharge with adequate resources    Interventions:  - follow poc: IV abx, IV steroids, labs, Resp flu panel, continuous monitoring   -Consults: ID  - See additional Care Plan goals for specific interventions  Outcome: Progressing  Goal: Patient/Family Short Term Goal  Description: Patient's Short Term Goal: 6/4 NOC: get ext flu panel results back, wean o2 as tolerated    Interventions:   - IV abx, IV steroids, continuous monitoring, inhalers  - See additional Care Plan goals for specific interventions  Outcome: Progressing

## 2022-06-05 NOTE — PLAN OF CARE
Problem: Patient/Family Goals  Goal: Patient/Family Long Term Goal  Description: Patient's Long Term Goal: discharge with adequate resources    Interventions:  - follow poc: IV abx, IV steroids, labs, Resp flu panel, continuous monitoring   -Consults: ID  - See additional Care Plan goals for specific interventions  Outcome: Progressing  Goal: Patient/Family Short Term Goal  Description: Patient's Short Term Goal: 6/4 NOC: get ext flu panel results back, wean o2 as tolerated    Interventions:   - IV abx, IV steroids, continuous monitoring, inhalers  - See additional Care Plan goals for specific interventions  Outcome: Progressing     Problem: RESPIRATORY - ADULT  Goal: Achieves optimal ventilation and oxygenation  Description: INTERVENTIONS:  - Assess for changes in respiratory status  - Assess for changes in mentation and behavior  - Position to facilitate oxygenation and minimize respiratory effort  - Oxygen supplementation based on oxygen saturation or ABGs  - Provide Smoking Cessation handout, if applicable  - Encourage broncho-pulmonary hygiene including cough, deep breathe, Incentive Spirometry  - Assess the need for suctioning and perform as needed  - Assess and instruct to report SOB or any respiratory difficulty  - Respiratory Therapy support as indicated  - Manage/alleviate anxiety  - Monitor for signs/symptoms of CO2 retention  Outcome: Progressing     Problem: PAIN - ADULT  Goal: Verbalizes/displays adequate comfort level or patient's stated pain goal  Description: INTERVENTIONS:  - Encourage pt to monitor pain and request assistance  - Assess pain using appropriate pain scale  - Administer analgesics based on type and severity of pain and evaluate response  - Implement non-pharmacological measures as appropriate and evaluate response  - Consider cultural and social influences on pain and pain management  - Manage/alleviate anxiety  - Utilize distraction and/or relaxation techniques  - Monitor for opioid side effects  - Notify MD/LIP if interventions unsuccessful or patient reports new pain  - Anticipate increased pain with activity and pre-medicate as appropriate  Outcome: Progressing     Problem: RISK FOR INFECTION - ADULT  Goal: Absence of fever/infection during anticipated neutropenic period  Description: INTERVENTIONS  - Monitor WBC  - Administer growth factors as ordered  - Implement neutropenic guidelines  Outcome: Progressing     Problem: SAFETY ADULT - FALL  Goal: Free from fall injury  Description: INTERVENTIONS:  - Assess pt frequently for physical needs  - Identify cognitive and physical deficits and behaviors that affect risk of falls.   - [place] fall precautions as indicated by assessment.  - Educate pt/family on patient safety including physical limitations  - Instruct pt to call for assistance with activity based on assessment  - Modify environment to reduce risk of injury  - Provide assistive devices as appropriate  - Consider OT/PT consult to assist with strengthening/mobility  - Encourage toileting schedule  Outcome: Progressing     Problem: DISCHARGE PLANNING  Goal: Discharge to home or other facility with appropriate resources  Description: INTERVENTIONS:  - Identify barriers to discharge w/pt and caregiver  - Include patient/family/discharge partner in discharge planning  - Arrange for needed discharge resources and transportation as appropriate  - Identify discharge learning needs (meds, wound care, etc)  - Arrange for interpreters to assist at discharge as needed  - Consider post-discharge preferences of patient/family/discharge partner  - Complete POLST form as appropriate  - Assess patient's ability to be responsible for managing their own health  - Refer to Case Management Department for coordinating discharge planning if the patient needs post-hospital services based on physician/LIP order or complex needs related to functional status, cognitive ability or social support system  Outcome: Progressing

## 2022-06-05 NOTE — PROGRESS NOTES
NURSING ADMISSION NOTE      Patient admitted via Cart  Oriented to room. Safety precautions initiated. Bed in low position. Call light in reach. Pt navigator and home med list complete. Pt A&Ox4. Ruby and glasses. Currently on 4L nc, o2 >90%. Baseline is 2L PRN at home. . C/o of non-productive cough. Sob noted with ambulation. Tele, NSR/ST. Afebrile. Heparin. IV abx. Voids, up SBA. UA sent. No c/o of pain. N/v/d. Regular diet. Pt and pt's wife updated on poc. No further needs at this time. Safety/fall precautions in place. WCTM.

## 2022-06-05 NOTE — PROGRESS NOTES
06/04/22 2015   Provider Notification   Reason for Communication Evaluate   Provider Name Naya Medina,    Method of Communication Page   Response Phone call;See orders   Notification Time 2020       Pt tested positive for COVID earlier today at [address] but tested negative here at [facility] according to pt's EMR. Pt o2 needs have increased from baseline and pt has a non-productive cough. Per MD place pt on covid isolation, consult ID and order extended flu panel. TM.

## 2022-06-05 NOTE — PROGRESS NOTES
06/04/22 2324   Provider Notification   Reason for Communication Review case   Provider Name Madisyn Lee    Method of Communication Page   Response Waiting for response   Notification Time [phone]     Pt's extended flu panel results include positive Covid and positive Rhinovirus/Entero. MD notifed, No new orders at this time.

## 2022-06-06 ENCOUNTER — APPOINTMENT (OUTPATIENT)
Dept: PULMONOLOGY | Age: 87
End: 2022-06-06

## 2022-06-06 VITALS
WEIGHT: 187.19 LBS | OXYGEN SATURATION: 93 % | TEMPERATURE: 98 F | HEIGHT: 68 IN | SYSTOLIC BLOOD PRESSURE: 116 MMHG | DIASTOLIC BLOOD PRESSURE: 67 MMHG | RESPIRATION RATE: 16 BRPM | HEART RATE: 74 BPM | BODY MASS INDEX: 28.37 KG/M2

## 2022-06-06 LAB
ALBUMIN SERPL-MCNC: 2.7 G/DL (ref 3.4–5)
ALBUMIN/GLOB SERPL: 0.6 {RATIO} (ref 1–2)
ALP LIVER SERPL-CCNC: 83 U/L
ALT SERPL-CCNC: 14 U/L
ANION GAP SERPL CALC-SCNC: 4 MMOL/L (ref 0–18)
AST SERPL-CCNC: 6 U/L (ref 15–37)
BASOPHILS # BLD: 0 X10(3) UL (ref 0–0.2)
BASOPHILS NFR BLD: 0 %
BILIRUB SERPL-MCNC: 0.4 MG/DL (ref 0.1–2)
BUN BLD-MCNC: 19 MG/DL (ref 7–18)
CALCIUM BLD-MCNC: 9.2 MG/DL (ref 8.5–10.1)
CHLORIDE SERPL-SCNC: 99 MMOL/L (ref 98–112)
CO2 SERPL-SCNC: 34 MMOL/L (ref 21–32)
CREAT BLD-MCNC: 0.9 MG/DL
CRP SERPL-MCNC: 5.37 MG/DL (ref ?–0.3)
D DIMER PPP FEU-MCNC: 0.87 UG/ML FEU (ref ?–0.85)
DEPRECATED HBV CORE AB SER IA-ACNC: 377.5 NG/ML
EOSINOPHIL # BLD: 0.12 X10(3) UL (ref 0–0.7)
EOSINOPHIL NFR BLD: 1 %
ERYTHROCYTE [DISTWIDTH] IN BLOOD BY AUTOMATED COUNT: 13 %
GLOBULIN PLAS-MCNC: 4.6 G/DL (ref 2.8–4.4)
GLUCOSE BLD-MCNC: 103 MG/DL (ref 70–99)
HCT VFR BLD AUTO: 43.6 %
HGB BLD-MCNC: 13.8 G/DL
LDH SERPL L TO P-CCNC: 128 U/L
LYMPHOCYTES NFR BLD: 19 %
LYMPHOCYTES NFR BLD: 2.32 X10(3) UL (ref 1–4)
MCH RBC QN AUTO: 30.7 PG (ref 26–34)
MCHC RBC AUTO-ENTMCNC: 31.7 G/DL (ref 31–37)
MCV RBC AUTO: 96.9 FL
MONOCYTES # BLD: 0.98 X10(3) UL (ref 0.1–1)
MONOCYTES NFR BLD: 8 %
MORPHOLOGY: NORMAL
NEUTROPHILS # BLD AUTO: 7.89 X10 (3) UL (ref 1.5–7.7)
NEUTROPHILS NFR BLD: 71 %
NEUTS BAND NFR BLD: 1 %
NEUTS HYPERSEG # BLD: 8.78 X10(3) UL (ref 1.5–7.7)
OSMOLALITY SERPL CALC.SUM OF ELEC: 287 MOSM/KG (ref 275–295)
PLATELET # BLD AUTO: 364 10(3)UL (ref 150–450)
PLATELET MORPHOLOGY: NORMAL
POTASSIUM SERPL-SCNC: 3.7 MMOL/L (ref 3.5–5.1)
PROT SERPL-MCNC: 7.3 G/DL (ref 6.4–8.2)
RBC # BLD AUTO: 4.5 X10(6)UL
SODIUM SERPL-SCNC: 137 MMOL/L (ref 136–145)
TOTAL CELLS COUNTED BLD: 100
WBC # BLD AUTO: 12.2 X10(3) UL (ref 4–11)

## 2022-06-06 PROCEDURE — 99239 HOSP IP/OBS DSCHRG MGMT >30: CPT | Performed by: HOSPITALIST

## 2022-06-06 RX ORDER — FUROSEMIDE 20 MG/1
20 TABLET ORAL DAILY
Refills: 0 | Status: SHIPPED | COMMUNITY
Start: 2022-06-06

## 2022-06-06 RX ORDER — FUROSEMIDE 20 MG/1
20 TABLET ORAL DAILY
Status: DISCONTINUED | OUTPATIENT
Start: 2022-06-06 | End: 2022-06-06

## 2022-06-06 RX ORDER — POTASSIUM CHLORIDE 20 MEQ/1
40 TABLET, EXTENDED RELEASE ORAL ONCE
Status: COMPLETED | OUTPATIENT
Start: 2022-06-06 | End: 2022-06-06

## 2022-06-06 NOTE — PROGRESS NOTES
Received pt Aox4.  on 1L NC. Baseline 2L PRN. Lung sounds diminished. NSR on tele. Heparin for DVT prophylaxis. Voids. Up self. Pt stating he just wants to go home. No further needs at this time.

## 2022-06-06 NOTE — PROGRESS NOTES
Pt stating he does not want 3rd dose of Remdesivir. Stated he \"just wants to go home\". Educated on risk of leaving against medical advice. Pt still stating he wants to leave. MDs notified. Pt with o2 and pulse ox at home already. Dr. Mehdi Hanna at bedside with pt and explained risk vs benefits of leaving AMA. Pt still wants to leave AMA. AMA paperwork signed and placed on chart.

## 2022-06-06 NOTE — PLAN OF CARE
Problem: Patient/Family Goals  Goal: Patient/Family Long Term Goal  Description: Patient's Long Term Goal: discharge with adequate resources    Interventions:  - follow poc: IV abx, IV steroids, labs, Resp flu panel, continuous monitoring   -Consults: ID  - See additional Care Plan goals for specific interventions  Outcome: Progressing  Goal: Patient/Family Short Term Goal  Description: Patient's Short Term Goal: 6/4 NOC: get ext flu panel results back, wean o2 as tolerated  6/5 NOC: sleep, get all medications  6/6 Am: Go home    Interventions:   - IV abx, IV steroids, continuous monitoring, inhalers  - See additional Care Plan goals for specific interventions  Outcome: Progressing     Problem: RESPIRATORY - ADULT  Goal: Achieves optimal ventilation and oxygenation  Description: INTERVENTIONS:  - Assess for changes in respiratory status  - Assess for changes in mentation and behavior  - Position to facilitate oxygenation and minimize respiratory effort  - Oxygen supplementation based on oxygen saturation or ABGs  - Provide Smoking Cessation handout, if applicable  - Encourage broncho-pulmonary hygiene including cough, deep breathe, Incentive Spirometry  - Assess the need for suctioning and perform as needed  - Assess and instruct to report SOB or any respiratory difficulty  - Respiratory Therapy support as indicated  - Manage/alleviate anxiety  - Monitor for signs/symptoms of CO2 retention  Outcome: Progressing     Problem: PAIN - ADULT  Goal: Verbalizes/displays adequate comfort level or patient's stated pain goal  Description: INTERVENTIONS:  - Encourage pt to monitor pain and request assistance  - Assess pain using appropriate pain scale  - Administer analgesics based on type and severity of pain and evaluate response  - Implement non-pharmacological measures as appropriate and evaluate response  - Consider cultural and social influences on pain and pain management  - Manage/alleviate anxiety  - Utilize distraction and/or relaxation techniques  - Monitor for opioid side effects  - Notify MD/LIP if interventions unsuccessful or patient reports new pain  - Anticipate increased pain with activity and pre-medicate as appropriate  Outcome: Progressing     Problem: RISK FOR INFECTION - ADULT  Goal: Absence of fever/infection during anticipated neutropenic period  Description: INTERVENTIONS  - Monitor WBC  - Administer growth factors as ordered  - Implement neutropenic guidelines  Outcome: Progressing     Problem: SAFETY ADULT - FALL  Goal: Free from fall injury  Description: INTERVENTIONS:  - Assess pt frequently for physical needs  - Identify cognitive and physical deficits and behaviors that affect risk of falls.   - [place] fall precautions as indicated by assessment.  - Educate pt/family on patient safety including physical limitations  - Instruct pt to call for assistance with activity based on assessment  - Modify environment to reduce risk of injury  - Provide assistive devices as appropriate  - Consider OT/PT consult to assist with strengthening/mobility  - Encourage toileting schedule  Outcome: Progressing     Problem: DISCHARGE PLANNING  Goal: Discharge to home or other facility with appropriate resources  Description: INTERVENTIONS:  - Identify barriers to discharge w/pt and caregiver  - Include patient/family/discharge partner in discharge planning  - Arrange for needed discharge resources and transportation as appropriate  - Identify discharge learning needs (meds, wound care, etc)  - Arrange for interpreters to assist at discharge as needed  - Consider post-discharge preferences of patient/family/discharge partner  - Complete POLST form as appropriate  - Assess patient's ability to be responsible for managing their own health  - Refer to Case Management Department for coordinating discharge planning if the patient needs post-hospital services based on physician/LIP order or complex needs related to functional status, cognitive ability or social support system  Outcome: Progressing

## 2022-06-07 ENCOUNTER — TELEPHONE (OUTPATIENT)
Dept: PULMONOLOGY | Age: 87
End: 2022-06-07

## 2022-06-07 RX ORDER — PREDNISONE 10 MG/1
10 TABLET ORAL DAILY
Qty: 30 TABLET | Refills: 0 | Status: SHIPPED | OUTPATIENT
Start: 2022-06-07 | End: 2023-05-01 | Stop reason: SDUPTHER

## 2022-06-10 ENCOUNTER — V-VISIT (OUTPATIENT)
Dept: PULMONOLOGY | Age: 87
End: 2022-06-10

## 2022-06-10 DIAGNOSIS — J40 BRONCHITIS: Primary | ICD-10-CM

## 2022-06-10 PROCEDURE — 99214 OFFICE O/P EST MOD 30 MIN: CPT | Performed by: INTERNAL MEDICINE

## 2022-06-10 RX ORDER — DOXYCYCLINE HYCLATE 100 MG/1
100 CAPSULE ORAL 2 TIMES DAILY
Qty: 14 CAPSULE | Refills: 0 | Status: SHIPPED | OUTPATIENT
Start: 2022-06-10 | End: 2022-06-17

## 2022-06-10 RX ORDER — PREDNISONE 10 MG/1
TABLET ORAL
Qty: 30 TABLET | Refills: 0 | Status: SHIPPED | OUTPATIENT
Start: 2022-06-10

## 2022-06-17 ENCOUNTER — TELEPHONE (OUTPATIENT)
Dept: PULMONOLOGY | Age: 87
End: 2022-06-17

## 2022-06-30 ENCOUNTER — TELEPHONE (OUTPATIENT)
Dept: PULMONOLOGY | Age: 87
End: 2022-06-30

## 2022-07-18 ENCOUNTER — OFFICE VISIT (OUTPATIENT)
Dept: PULMONOLOGY | Age: 87
End: 2022-07-18

## 2022-07-18 VITALS
BODY MASS INDEX: 28.78 KG/M2 | WEIGHT: 189.93 LBS | DIASTOLIC BLOOD PRESSURE: 58 MMHG | HEART RATE: 102 BPM | HEIGHT: 68 IN | OXYGEN SATURATION: 98 % | SYSTOLIC BLOOD PRESSURE: 122 MMHG

## 2022-07-18 DIAGNOSIS — J44.9 CHRONIC OBSTRUCTIVE PULMONARY DISEASE, UNSPECIFIED COPD TYPE (CMD): ICD-10-CM

## 2022-07-18 PROCEDURE — 99214 OFFICE O/P EST MOD 30 MIN: CPT | Performed by: INTERNAL MEDICINE

## 2022-07-18 RX ORDER — ASPIRIN 81 MG/1
81 TABLET ORAL DAILY
COMMUNITY

## 2022-08-11 ENCOUNTER — TELEPHONE (OUTPATIENT)
Dept: INTERNAL MEDICINE | Age: 87
End: 2022-08-11

## 2022-08-26 ENCOUNTER — TELEPHONE (OUTPATIENT)
Dept: PULMONOLOGY | Age: 87
End: 2022-08-26

## 2022-08-30 ENCOUNTER — WALK IN (OUTPATIENT)
Dept: URGENT CARE | Age: 87
End: 2022-08-30

## 2022-08-30 VITALS
SYSTOLIC BLOOD PRESSURE: 116 MMHG | DIASTOLIC BLOOD PRESSURE: 63 MMHG | HEART RATE: 95 BPM | RESPIRATION RATE: 20 BRPM | TEMPERATURE: 98 F | OXYGEN SATURATION: 97 %

## 2022-08-30 DIAGNOSIS — J32.9 SINUSITIS, UNSPECIFIED CHRONICITY, UNSPECIFIED LOCATION: ICD-10-CM

## 2022-08-30 DIAGNOSIS — M54.9 BACK PAIN, UNSPECIFIED BACK LOCATION, UNSPECIFIED BACK PAIN LATERALITY, UNSPECIFIED CHRONICITY: Primary | ICD-10-CM

## 2022-08-30 PROCEDURE — 99214 OFFICE O/P EST MOD 30 MIN: CPT | Performed by: FAMILY MEDICINE

## 2022-08-30 RX ORDER — PREDNISONE 50 MG/1
50 TABLET ORAL DAILY
Qty: 5 TABLET | Refills: 0 | Status: SHIPPED | OUTPATIENT
Start: 2022-08-30 | End: 2022-09-04

## 2022-08-30 RX ORDER — DOXYCYCLINE HYCLATE 100 MG/1
100 CAPSULE ORAL 2 TIMES DAILY
Qty: 20 CAPSULE | Refills: 0 | Status: SHIPPED | OUTPATIENT
Start: 2022-08-30 | End: 2022-09-09

## 2022-08-30 RX ORDER — HYDROCODONE BITARTRATE AND ACETAMINOPHEN 5; 325 MG/1; MG/1
1 TABLET ORAL EVERY 6 HOURS PRN
Qty: 15 TABLET | Refills: 0 | Status: SHIPPED | OUTPATIENT
Start: 2022-08-30 | End: 2022-09-02

## 2022-08-30 ASSESSMENT — ENCOUNTER SYMPTOMS
COUGH: 1
BACK PAIN: 1

## 2022-08-30 ASSESSMENT — PAIN SCALES - GENERAL: PAINLEVEL: 0

## 2022-10-20 ENCOUNTER — NURSE ONLY (OUTPATIENT)
Dept: FAMILY MEDICINE | Age: 87
End: 2022-10-20

## 2022-10-20 DIAGNOSIS — Z23 INFLUENZA VACCINE NEEDED: Primary | ICD-10-CM

## 2022-10-20 PROCEDURE — G0008 ADMIN INFLUENZA VIRUS VAC: HCPCS | Performed by: FAMILY MEDICINE

## 2022-10-20 PROCEDURE — 90662 IIV NO PRSV INCREASED AG IM: CPT | Performed by: FAMILY MEDICINE

## 2022-10-25 DIAGNOSIS — J44.9 CHRONIC OBSTRUCTIVE PULMONARY DISEASE, UNSPECIFIED COPD TYPE (CMD): ICD-10-CM

## 2022-10-25 RX ORDER — PREDNISONE 10 MG/1
10 TABLET ORAL DAILY
Qty: 30 TABLET | Refills: 3 | Status: SHIPPED | OUTPATIENT
Start: 2022-10-25 | End: 2023-02-17

## 2022-10-27 ENCOUNTER — OFFICE VISIT (OUTPATIENT)
Dept: OTOLARYNGOLOGY | Age: 87
End: 2022-10-27

## 2022-10-27 VITALS — BODY MASS INDEX: 28.13 KG/M2 | WEIGHT: 185 LBS

## 2022-10-27 DIAGNOSIS — J32.9 CHRONIC SINUSITIS, UNSPECIFIED LOCATION: Primary | ICD-10-CM

## 2022-10-27 DIAGNOSIS — H61.21 IMPACTED CERUMEN OF RIGHT EAR: ICD-10-CM

## 2022-10-27 DIAGNOSIS — J34.2 DNS (DEVIATED NASAL SEPTUM): ICD-10-CM

## 2022-10-27 DIAGNOSIS — K14.8 TONGUE LESION: ICD-10-CM

## 2022-10-27 DIAGNOSIS — J44.9 CHRONIC OBSTRUCTIVE PULMONARY DISEASE, UNSPECIFIED COPD TYPE (CMD): ICD-10-CM

## 2022-10-27 PROCEDURE — 99214 OFFICE O/P EST MOD 30 MIN: CPT | Performed by: OTOLARYNGOLOGY

## 2022-10-27 PROCEDURE — 31237 NSL/SINS NDSC SURG BX POLYPC: CPT | Performed by: OTOLARYNGOLOGY

## 2022-10-27 PROCEDURE — 87070 CULTURE OTHR SPECIMN AEROBIC: CPT | Performed by: CLINICAL MEDICAL LABORATORY

## 2022-10-27 PROCEDURE — 87205 SMEAR GRAM STAIN: CPT | Performed by: CLINICAL MEDICAL LABORATORY

## 2022-10-27 PROCEDURE — 87077 CULTURE AEROBIC IDENTIFY: CPT | Performed by: CLINICAL MEDICAL LABORATORY

## 2022-10-27 PROCEDURE — 69210 REMOVE IMPACTED EAR WAX UNI: CPT | Performed by: OTOLARYNGOLOGY

## 2022-10-27 PROCEDURE — 87186 SC STD MICRODIL/AGAR DIL: CPT | Performed by: CLINICAL MEDICAL LABORATORY

## 2022-10-27 RX ORDER — SULFAMETHOXAZOLE AND TRIMETHOPRIM 800; 160 MG/1; MG/1
1 TABLET ORAL 2 TIMES DAILY
Qty: 28 TABLET | Refills: 0 | Status: SHIPPED | OUTPATIENT
Start: 2022-10-27 | End: 2022-11-10

## 2022-10-27 RX ORDER — FLUTICASONE PROPIONATE 50 MCG
2 SPRAY, SUSPENSION (ML) NASAL DAILY
Qty: 16 G | Refills: 3 | Status: SHIPPED | OUTPATIENT
Start: 2022-10-27 | End: 2023-02-20

## 2022-10-27 RX ORDER — OXYMETAZOLINE HYDROCHLORIDE 0.05 G/100ML
2 SPRAY NASAL 2 TIMES DAILY
COMMUNITY

## 2022-10-28 ENCOUNTER — LAB REQUISITION (OUTPATIENT)
Dept: LAB | Age: 87
End: 2022-10-28

## 2022-10-28 ENCOUNTER — OFFICE VISIT (OUTPATIENT)
Dept: PULMONOLOGY | Age: 87
End: 2022-10-28

## 2022-10-28 VITALS
HEART RATE: 90 BPM | OXYGEN SATURATION: 98 % | DIASTOLIC BLOOD PRESSURE: 72 MMHG | TEMPERATURE: 96.8 F | WEIGHT: 179 LBS | SYSTOLIC BLOOD PRESSURE: 126 MMHG | BODY MASS INDEX: 27.13 KG/M2 | HEIGHT: 68 IN

## 2022-10-28 DIAGNOSIS — J32.9 CHRONIC SINUSITIS, UNSPECIFIED: ICD-10-CM

## 2022-10-28 DIAGNOSIS — J44.9 CHRONIC OBSTRUCTIVE PULMONARY DISEASE, UNSPECIFIED COPD TYPE (CMD): ICD-10-CM

## 2022-10-28 PROCEDURE — 99214 OFFICE O/P EST MOD 30 MIN: CPT | Performed by: INTERNAL MEDICINE

## 2022-10-31 LAB
BACTERIA SPEC AEROBE CULT: ABNORMAL
GRAM STN SPEC: ABNORMAL

## 2022-11-15 ENCOUNTER — TELEPHONE (OUTPATIENT)
Dept: INTERNAL MEDICINE | Age: 87
End: 2022-11-15

## 2022-11-22 ENCOUNTER — APPOINTMENT (OUTPATIENT)
Dept: OTOLARYNGOLOGY | Age: 87
End: 2022-11-22

## 2022-11-23 ENCOUNTER — TELEPHONE (OUTPATIENT)
Dept: SCHEDULING | Age: 87
End: 2022-11-23

## 2022-11-23 RX ORDER — DOXYCYCLINE HYCLATE 100 MG/1
100 CAPSULE ORAL 2 TIMES DAILY
Qty: 10 CAPSULE | Refills: 0 | OUTPATIENT
Start: 2022-11-23 | End: 2022-11-28

## 2023-01-04 ENCOUNTER — TELEPHONE (OUTPATIENT)
Dept: OTHER | Age: 88
End: 2023-01-04

## 2023-01-10 ENCOUNTER — OFFICE VISIT (OUTPATIENT)
Dept: OTOLARYNGOLOGY | Age: 88
End: 2023-01-10

## 2023-01-10 VITALS — WEIGHT: 180 LBS | HEIGHT: 68 IN | BODY MASS INDEX: 27.28 KG/M2

## 2023-01-10 DIAGNOSIS — J44.9 CHRONIC OBSTRUCTIVE PULMONARY DISEASE, UNSPECIFIED COPD TYPE (CMD): ICD-10-CM

## 2023-01-10 DIAGNOSIS — J32.9 CHRONIC SINUSITIS, UNSPECIFIED LOCATION: ICD-10-CM

## 2023-01-10 DIAGNOSIS — K14.8 TONGUE LESION: Primary | ICD-10-CM

## 2023-01-10 PROCEDURE — 87077 CULTURE AEROBIC IDENTIFY: CPT | Performed by: CLINICAL MEDICAL LABORATORY

## 2023-01-10 PROCEDURE — 87186 SC STD MICRODIL/AGAR DIL: CPT | Performed by: CLINICAL MEDICAL LABORATORY

## 2023-01-10 PROCEDURE — 92504 EAR MICROSCOPY EXAMINATION: CPT | Performed by: OTOLARYNGOLOGY

## 2023-01-10 PROCEDURE — 87205 SMEAR GRAM STAIN: CPT | Performed by: CLINICAL MEDICAL LABORATORY

## 2023-01-10 PROCEDURE — 31237 NSL/SINS NDSC SURG BX POLYPC: CPT | Performed by: OTOLARYNGOLOGY

## 2023-01-10 PROCEDURE — 87070 CULTURE OTHR SPECIMN AEROBIC: CPT | Performed by: CLINICAL MEDICAL LABORATORY

## 2023-01-10 PROCEDURE — 41100 BIOPSY OF TONGUE: CPT | Performed by: OTOLARYNGOLOGY

## 2023-01-10 PROCEDURE — 88305 TISSUE EXAM BY PATHOLOGIST: CPT | Performed by: CLINICAL MEDICAL LABORATORY

## 2023-01-10 PROCEDURE — 99214 OFFICE O/P EST MOD 30 MIN: CPT | Performed by: OTOLARYNGOLOGY

## 2023-01-12 ENCOUNTER — TELEPHONE (OUTPATIENT)
Dept: OTOLARYNGOLOGY | Age: 88
End: 2023-01-12

## 2023-01-12 LAB
BACTERIA SPEC AEROBE CULT: ABNORMAL
GRAM STN SPEC: ABNORMAL

## 2023-01-12 RX ORDER — SULFAMETHOXAZOLE AND TRIMETHOPRIM 800; 160 MG/1; MG/1
1 TABLET ORAL 2 TIMES DAILY
Qty: 28 TABLET | Refills: 0 | Status: SHIPPED | OUTPATIENT
Start: 2023-01-12 | End: 2023-01-26

## 2023-01-17 ENCOUNTER — TELEPHONE (OUTPATIENT)
Dept: OTOLARYNGOLOGY | Age: 88
End: 2023-01-17

## 2023-01-23 DIAGNOSIS — J44.9 CHRONIC OBSTRUCTIVE PULMONARY DISEASE, UNSPECIFIED COPD TYPE (CMD): ICD-10-CM

## 2023-01-23 RX ORDER — IPRATROPIUM BROMIDE AND ALBUTEROL SULFATE 2.5; .5 MG/3ML; MG/3ML
SOLUTION RESPIRATORY (INHALATION)
Qty: 360 ML | Refills: 11 | Status: SHIPPED | OUTPATIENT
Start: 2023-01-23

## 2023-01-25 LAB
ASR DISCLAIMER: NORMAL
CASE RPRT: NORMAL
CLINICAL INFO: NORMAL
PATH REPORT.FINAL DX SPEC: NORMAL
PATH REPORT.FINAL DX SPEC: NORMAL
PATH REPORT.GROSS SPEC: NORMAL

## 2023-01-26 ENCOUNTER — TELEPHONE (OUTPATIENT)
Dept: PULMONOLOGY | Age: 88
End: 2023-01-26

## 2023-02-08 ENCOUNTER — OFFICE VISIT (OUTPATIENT)
Dept: OTOLARYNGOLOGY | Age: 88
End: 2023-02-08

## 2023-02-08 VITALS — HEIGHT: 68 IN | BODY MASS INDEX: 27.28 KG/M2 | WEIGHT: 180 LBS

## 2023-02-08 DIAGNOSIS — K14.8 TONGUE LESION: ICD-10-CM

## 2023-02-08 DIAGNOSIS — J34.2 DNS (DEVIATED NASAL SEPTUM): Primary | ICD-10-CM

## 2023-02-08 PROCEDURE — 99213 OFFICE O/P EST LOW 20 MIN: CPT | Performed by: OTOLARYNGOLOGY

## 2023-02-10 ENCOUNTER — TELEPHONE (OUTPATIENT)
Dept: PULMONOLOGY | Age: 88
End: 2023-02-10

## 2023-02-11 ENCOUNTER — WALK IN (OUTPATIENT)
Dept: URGENT CARE | Age: 88
End: 2023-02-11

## 2023-02-11 VITALS
HEIGHT: 68 IN | SYSTOLIC BLOOD PRESSURE: 135 MMHG | BODY MASS INDEX: 27.37 KG/M2 | OXYGEN SATURATION: 94 % | HEART RATE: 110 BPM | RESPIRATION RATE: 18 BRPM | TEMPERATURE: 98.3 F | DIASTOLIC BLOOD PRESSURE: 67 MMHG

## 2023-02-11 DIAGNOSIS — M79.89 LEG SWELLING: Primary | ICD-10-CM

## 2023-02-11 LAB — D DIMER PPP DDU-MCNC: 159 NG/ML (ref 0–400)

## 2023-02-11 PROCEDURE — 99214 OFFICE O/P EST MOD 30 MIN: CPT | Performed by: INTERNAL MEDICINE

## 2023-02-11 PROCEDURE — 85379 FIBRIN DEGRADATION QUANT: CPT | Performed by: INTERNAL MEDICINE

## 2023-02-15 DIAGNOSIS — J44.9 CHRONIC OBSTRUCTIVE PULMONARY DISEASE, UNSPECIFIED COPD TYPE (CMD): ICD-10-CM

## 2023-02-17 RX ORDER — PREDNISONE 10 MG/1
10 TABLET ORAL DAILY
Qty: 30 TABLET | Refills: 3 | Status: SHIPPED | OUTPATIENT
Start: 2023-02-17 | End: 2023-09-01 | Stop reason: SDUPTHER

## 2023-02-20 ENCOUNTER — TELEPHONE (OUTPATIENT)
Dept: PULMONOLOGY | Age: 88
End: 2023-02-20

## 2023-02-20 RX ORDER — FLUTICASONE PROPIONATE 50 MCG
SPRAY, SUSPENSION (ML) NASAL
Qty: 16 G | Refills: 3 | Status: SHIPPED | OUTPATIENT
Start: 2023-02-20 | End: 2023-07-20

## 2023-02-20 RX ORDER — PREDNISONE 10 MG/1
10 TABLET ORAL DAILY
Qty: 30 TABLET | Refills: 0 | Status: CANCELLED | OUTPATIENT
Start: 2023-02-20

## 2023-02-28 ENCOUNTER — APPOINTMENT (OUTPATIENT)
Dept: PULMONOLOGY | Age: 88
End: 2023-02-28

## 2023-03-03 ENCOUNTER — TELEPHONE (OUTPATIENT)
Dept: OTOLARYNGOLOGY | Age: 88
End: 2023-03-03

## 2023-03-23 ENCOUNTER — TELEPHONE (OUTPATIENT)
Dept: PULMONOLOGY | Age: 88
End: 2023-03-23

## 2023-03-23 DIAGNOSIS — J44.9 CHRONIC OBSTRUCTIVE PULMONARY DISEASE, UNSPECIFIED COPD TYPE (CMD): Primary | ICD-10-CM

## 2023-03-23 DIAGNOSIS — R06.02 SHORTNESS OF BREATH: ICD-10-CM

## 2023-03-23 DIAGNOSIS — N13.8 BPH WITH OBSTRUCTION/LOWER URINARY TRACT SYMPTOMS: ICD-10-CM

## 2023-03-23 DIAGNOSIS — N39.41 URGE INCONTINENCE: ICD-10-CM

## 2023-03-23 DIAGNOSIS — U09.9 POST-COVID CHRONIC DYSPNEA: ICD-10-CM

## 2023-03-23 DIAGNOSIS — N40.1 BPH WITH OBSTRUCTION/LOWER URINARY TRACT SYMPTOMS: ICD-10-CM

## 2023-03-23 DIAGNOSIS — J40 BRONCHITIS: ICD-10-CM

## 2023-03-23 DIAGNOSIS — R06.09 POST-COVID CHRONIC DYSPNEA: ICD-10-CM

## 2023-03-23 RX ORDER — FINASTERIDE 5 MG/1
5 TABLET, FILM COATED ORAL DAILY
Qty: 90 TABLET | Refills: 0 | Status: SHIPPED | OUTPATIENT
Start: 2023-03-23 | End: 2023-04-03 | Stop reason: SDUPTHER

## 2023-03-23 RX ORDER — TOLTERODINE TARTRATE 2 MG/1
TABLET, EXTENDED RELEASE ORAL
Qty: 180 TABLET | Refills: 0 | Status: SHIPPED | OUTPATIENT
Start: 2023-03-23 | End: 2023-04-03 | Stop reason: SDUPTHER

## 2023-03-23 RX ORDER — DOXAZOSIN 8 MG/1
TABLET ORAL
Qty: 90 TABLET | Refills: 0 | Status: SHIPPED | OUTPATIENT
Start: 2023-03-23 | End: 2023-04-03 | Stop reason: SDUPTHER

## 2023-03-30 ENCOUNTER — APPOINTMENT (OUTPATIENT)
Dept: UROLOGY | Age: 88
End: 2023-03-30

## 2023-04-03 ENCOUNTER — OFFICE VISIT (OUTPATIENT)
Dept: UROLOGY | Age: 88
End: 2023-04-03

## 2023-04-03 VITALS — WEIGHT: 175 LBS | TEMPERATURE: 97.5 F | HEIGHT: 68 IN | BODY MASS INDEX: 26.52 KG/M2

## 2023-04-03 DIAGNOSIS — N40.1 BPH WITH OBSTRUCTION/LOWER URINARY TRACT SYMPTOMS: Primary | ICD-10-CM

## 2023-04-03 DIAGNOSIS — N39.41 URGE INCONTINENCE: ICD-10-CM

## 2023-04-03 DIAGNOSIS — N13.8 BPH WITH OBSTRUCTION/LOWER URINARY TRACT SYMPTOMS: Primary | ICD-10-CM

## 2023-04-03 LAB — BLDR SCAN MLS: 103

## 2023-04-03 PROCEDURE — 99214 OFFICE O/P EST MOD 30 MIN: CPT | Performed by: UROLOGY

## 2023-04-03 PROCEDURE — 51798 US URINE CAPACITY MEASURE: CPT | Performed by: UROLOGY

## 2023-04-03 RX ORDER — FINASTERIDE 5 MG/1
5 TABLET, FILM COATED ORAL DAILY
Qty: 90 TABLET | Refills: 3 | Status: SHIPPED | OUTPATIENT
Start: 2023-04-03

## 2023-04-03 RX ORDER — DOXAZOSIN 8 MG/1
8 TABLET ORAL NIGHTLY
Qty: 90 TABLET | Refills: 3 | Status: SHIPPED | OUTPATIENT
Start: 2023-04-03

## 2023-04-03 RX ORDER — TOLTERODINE TARTRATE 2 MG/1
2 TABLET, EXTENDED RELEASE ORAL 2 TIMES DAILY
Qty: 180 TABLET | Refills: 3 | Status: SHIPPED | OUTPATIENT
Start: 2023-04-03

## 2023-04-12 ENCOUNTER — OFFICE VISIT (OUTPATIENT)
Dept: OTOLARYNGOLOGY | Age: 88
End: 2023-04-12

## 2023-04-12 ENCOUNTER — OFFICE VISIT (OUTPATIENT)
Dept: AUDIOLOGY | Age: 88
End: 2023-04-12

## 2023-04-12 VITALS — BODY MASS INDEX: 26.52 KG/M2 | WEIGHT: 175 LBS | HEIGHT: 68 IN

## 2023-04-12 DIAGNOSIS — H90.3 SENSORINEURAL HEARING LOSS (SNHL) OF BOTH EARS: Primary | ICD-10-CM

## 2023-04-12 DIAGNOSIS — J32.8 OTHER CHRONIC SINUSITIS: ICD-10-CM

## 2023-04-12 DIAGNOSIS — H61.23 BILATERAL IMPACTED CERUMEN: ICD-10-CM

## 2023-04-12 DIAGNOSIS — H90.3 SENSORINEURAL HEARING LOSS, BILATERAL: Primary | ICD-10-CM

## 2023-04-12 DIAGNOSIS — H91.90 HEARING LOSS, UNSPECIFIED HEARING LOSS TYPE, UNSPECIFIED LATERALITY: ICD-10-CM

## 2023-04-12 DIAGNOSIS — J31.0 CHRONIC RHINITIS: ICD-10-CM

## 2023-04-12 PROCEDURE — 92557 COMPREHENSIVE HEARING TEST: CPT | Performed by: AUDIOLOGIST

## 2023-04-12 PROCEDURE — 99214 OFFICE O/P EST MOD 30 MIN: CPT | Performed by: OTOLARYNGOLOGY

## 2023-04-12 PROCEDURE — G0268 REMOVAL OF IMPACTED WAX MD: HCPCS | Performed by: OTOLARYNGOLOGY

## 2023-04-12 PROCEDURE — 92567 TYMPANOMETRY: CPT | Performed by: AUDIOLOGIST

## 2023-05-01 ENCOUNTER — TELEPHONE (OUTPATIENT)
Dept: OTOLARYNGOLOGY | Age: 88
End: 2023-05-01

## 2023-05-01 ENCOUNTER — OFFICE VISIT (OUTPATIENT)
Dept: PULMONOLOGY | Age: 88
End: 2023-05-01

## 2023-05-01 ENCOUNTER — IMAGING SERVICES (OUTPATIENT)
Dept: GENERAL RADIOLOGY | Age: 88
End: 2023-05-01

## 2023-05-01 VITALS
RESPIRATION RATE: 18 BRPM | WEIGHT: 177.8 LBS | DIASTOLIC BLOOD PRESSURE: 58 MMHG | HEART RATE: 99 BPM | BODY MASS INDEX: 26.95 KG/M2 | HEIGHT: 68 IN | TEMPERATURE: 97.2 F | OXYGEN SATURATION: 97 % | SYSTOLIC BLOOD PRESSURE: 118 MMHG

## 2023-05-01 DIAGNOSIS — J44.9 CHRONIC OBSTRUCTIVE PULMONARY DISEASE, UNSPECIFIED COPD TYPE (CMD): ICD-10-CM

## 2023-05-01 PROCEDURE — 99214 OFFICE O/P EST MOD 30 MIN: CPT | Performed by: INTERNAL MEDICINE

## 2023-05-01 PROCEDURE — 71046 X-RAY EXAM CHEST 2 VIEWS: CPT | Performed by: RADIOLOGY

## 2023-05-01 RX ORDER — IPRATROPIUM BROMIDE AND ALBUTEROL 20; 100 UG/1; UG/1
1 SPRAY, METERED RESPIRATORY (INHALATION) EVERY 4 HOURS PRN
Qty: 4 G | Refills: 11 | Status: SHIPPED | OUTPATIENT
Start: 2023-05-01

## 2023-05-01 RX ORDER — ALBUTEROL SULFATE 90 UG/1
2 AEROSOL, METERED RESPIRATORY (INHALATION) EVERY 4 HOURS PRN
Qty: 1 EACH | Refills: 1 | Status: CANCELLED | OUTPATIENT
Start: 2023-05-01

## 2023-05-01 RX ORDER — FLUTICASONE PROPIONATE AND SALMETEROL 250; 50 UG/1; UG/1
1 POWDER RESPIRATORY (INHALATION) 2 TIMES DAILY
Qty: 60 EACH | Refills: 11 | Status: SHIPPED | OUTPATIENT
Start: 2023-05-01 | End: 2023-10-19 | Stop reason: SDUPTHER

## 2023-05-01 RX ORDER — NICOTINE 21 MG/24HR
1 PATCH, TRANSDERMAL 24 HOURS TRANSDERMAL EVERY 24 HOURS
Qty: 14 PATCH | Refills: 0 | Status: SHIPPED | OUTPATIENT
Start: 2023-05-01 | End: 2023-05-15

## 2023-05-01 RX ORDER — FUROSEMIDE 20 MG/1
20 TABLET ORAL DAILY
Qty: 60 TABLET | Refills: 3 | Status: SHIPPED | OUTPATIENT
Start: 2023-05-01 | End: 2023-05-31

## 2023-05-01 RX ORDER — FLUTICASONE PROPIONATE AND SALMETEROL 250; 50 UG/1; UG/1
1 POWDER RESPIRATORY (INHALATION) 2 TIMES DAILY
Qty: 60 EACH | Refills: 11 | Status: CANCELLED | OUTPATIENT
Start: 2023-05-01

## 2023-05-01 RX ORDER — PREDNISONE 10 MG/1
10 TABLET ORAL DAILY
Qty: 30 TABLET | Refills: 0 | Status: SHIPPED | OUTPATIENT
Start: 2023-05-01 | End: 2023-08-01 | Stop reason: SDUPTHER

## 2023-05-01 ASSESSMENT — PAIN SCALES - GENERAL: PAINLEVEL: 6

## 2023-05-02 DIAGNOSIS — R06.09 POST-COVID CHRONIC DYSPNEA: ICD-10-CM

## 2023-05-02 DIAGNOSIS — U09.9 POST-COVID CHRONIC DYSPNEA: ICD-10-CM

## 2023-05-02 DIAGNOSIS — J44.9 CHRONIC OBSTRUCTIVE PULMONARY DISEASE, UNSPECIFIED COPD TYPE (CMD): Primary | ICD-10-CM

## 2023-05-02 DIAGNOSIS — J40 BRONCHITIS: ICD-10-CM

## 2023-06-15 ENCOUNTER — APPOINTMENT (OUTPATIENT)
Dept: UROLOGY | Age: 88
End: 2023-06-15

## 2023-06-28 ENCOUNTER — TELEPHONE (OUTPATIENT)
Dept: PULMONOLOGY | Age: 88
End: 2023-06-28

## 2023-07-07 ENCOUNTER — OFFICE VISIT (OUTPATIENT)
Dept: OPHTHALMOLOGY | Age: 88
End: 2023-07-07

## 2023-07-07 DIAGNOSIS — H40.1113 PRIMARY OPEN ANGLE GLAUCOMA (POAG) OF RIGHT EYE, SEVERE STAGE: Primary | ICD-10-CM

## 2023-07-07 DIAGNOSIS — H35.30 ARMD (AGE-RELATED MACULAR DEGENERATION), BILATERAL: ICD-10-CM

## 2023-07-07 DIAGNOSIS — Z96.1 PSEUDOPHAKIA OF BOTH EYES: ICD-10-CM

## 2023-07-07 PROCEDURE — 92014 COMPRE OPH EXAM EST PT 1/>: CPT | Performed by: OPHTHALMOLOGY

## 2023-07-07 PROCEDURE — 92133 CPTRZD OPH DX IMG PST SGM ON: CPT | Performed by: OPHTHALMOLOGY

## 2023-07-07 RX ORDER — LATANOPROST 50 UG/ML
1 SOLUTION/ DROPS OPHTHALMIC NIGHTLY
Qty: 7.5 ML | Refills: 3 | Status: SHIPPED | OUTPATIENT
Start: 2023-07-07

## 2023-07-07 ASSESSMENT — VISUAL ACUITY
CORRECTION_TYPE: GLASSES
OD_PH_CC: 20/60
OD_PH_CC+: -2
OD_CC: 20/100
METHOD: SNELLEN - LINEAR
OS_CC: 20/40

## 2023-07-07 ASSESSMENT — CONF VISUAL FIELD
OD_SUPERIOR_TEMPORAL_RESTRICTION: 1
OD_INFERIOR_TEMPORAL_RESTRICTION: 1
OD_SUPERIOR_NASAL_RESTRICTION: 1
OD_INFERIOR_NASAL_RESTRICTION: 1

## 2023-07-07 ASSESSMENT — PACHYMETRY
OS_CT(UM): 566
OD_CT(UM): 548

## 2023-07-07 ASSESSMENT — REFRACTION_WEARINGRX
OS_SPHERE: +1.25
OD_ADD: +3.00
OD_SPHERE: -0.50
OD_AXIS: 020
OS_CYLINDER: SPHERE
OD_CYLINDER: +0.50
SPECS_TYPE: BIFOCAL
OS_ADD: +3.00

## 2023-07-07 ASSESSMENT — EXTERNAL EXAM - LEFT EYE: OS_EXAM: NORMAL

## 2023-07-07 ASSESSMENT — SLIT LAMP EXAM - LIDS
COMMENTS: NORMAL
COMMENTS: NORMAL

## 2023-07-07 ASSESSMENT — EXTERNAL EXAM - RIGHT EYE: OD_EXAM: NORMAL

## 2023-07-07 ASSESSMENT — TONOMETRY
OS_IOP_MMHG: 15
OD_IOP_MMHG: 14
IOP_METHOD: APPLANATION

## 2023-07-14 ENCOUNTER — OFFICE VISIT (OUTPATIENT)
Dept: PULMONOLOGY | Age: 88
End: 2023-07-14

## 2023-07-14 ENCOUNTER — TELEPHONE (OUTPATIENT)
Dept: PULMONOLOGY | Age: 88
End: 2023-07-14

## 2023-07-14 VITALS
TEMPERATURE: 98.8 F | HEIGHT: 68 IN | SYSTOLIC BLOOD PRESSURE: 116 MMHG | DIASTOLIC BLOOD PRESSURE: 74 MMHG | BODY MASS INDEX: 27.4 KG/M2 | OXYGEN SATURATION: 94 % | RESPIRATION RATE: 24 BRPM | HEART RATE: 104 BPM | WEIGHT: 180.78 LBS

## 2023-07-14 DIAGNOSIS — J40 BRONCHITIS: Primary | ICD-10-CM

## 2023-07-14 PROCEDURE — 99214 OFFICE O/P EST MOD 30 MIN: CPT | Performed by: INTERNAL MEDICINE

## 2023-07-14 RX ORDER — DOXYCYCLINE HYCLATE 100 MG/1
100 CAPSULE ORAL 2 TIMES DAILY
Qty: 14 CAPSULE | Refills: 0 | Status: SHIPPED | OUTPATIENT
Start: 2023-07-14 | End: 2023-07-28 | Stop reason: SDUPTHER

## 2023-07-14 ASSESSMENT — PAIN SCALES - GENERAL: PAINLEVEL: 0

## 2023-07-15 ENCOUNTER — LAB SERVICES (OUTPATIENT)
Dept: LAB | Age: 88
End: 2023-07-15

## 2023-07-15 PROCEDURE — 87186 SC STD MICRODIL/AGAR DIL: CPT | Performed by: CLINICAL MEDICAL LABORATORY

## 2023-07-15 PROCEDURE — 87205 SMEAR GRAM STAIN: CPT | Performed by: CLINICAL MEDICAL LABORATORY

## 2023-07-15 PROCEDURE — 87070 CULTURE OTHR SPECIMN AEROBIC: CPT | Performed by: CLINICAL MEDICAL LABORATORY

## 2023-07-15 PROCEDURE — 87077 CULTURE AEROBIC IDENTIFY: CPT | Performed by: CLINICAL MEDICAL LABORATORY

## 2023-07-18 LAB
BACTERIA SPT AEROBE CULT: ABNORMAL
BACTERIA SPT AEROBE CULT: ABNORMAL
GRAM STN SPEC: ABNORMAL

## 2023-07-20 RX ORDER — FLUTICASONE PROPIONATE 50 MCG
SPRAY, SUSPENSION (ML) NASAL
Qty: 16 G | Refills: 3 | Status: SHIPPED | OUTPATIENT
Start: 2023-07-20

## 2023-07-24 ENCOUNTER — TELEPHONE (OUTPATIENT)
Dept: PULMONOLOGY | Age: 88
End: 2023-07-24

## 2023-07-24 DIAGNOSIS — R05.9 COUGH, UNSPECIFIED TYPE: Primary | ICD-10-CM

## 2023-07-25 ENCOUNTER — IMAGING SERVICES (OUTPATIENT)
Dept: GENERAL RADIOLOGY | Age: 88
End: 2023-07-25
Attending: INTERNAL MEDICINE

## 2023-07-25 DIAGNOSIS — R05.9 COUGH, UNSPECIFIED TYPE: ICD-10-CM

## 2023-07-25 PROCEDURE — 71046 X-RAY EXAM CHEST 2 VIEWS: CPT | Performed by: RADIOLOGY

## 2023-07-26 ENCOUNTER — TELEPHONE (OUTPATIENT)
Dept: PULMONOLOGY | Age: 88
End: 2023-07-26

## 2023-07-26 DIAGNOSIS — J40 BRONCHITIS: ICD-10-CM

## 2023-07-28 RX ORDER — DOXYCYCLINE HYCLATE 100 MG/1
100 CAPSULE ORAL 2 TIMES DAILY
Qty: 10 CAPSULE | Refills: 0 | Status: SHIPPED | OUTPATIENT
Start: 2023-07-28 | End: 2023-08-02

## 2023-07-28 RX ORDER — PREDNISONE 10 MG/1
40 TABLET ORAL DAILY
Qty: 20 TABLET | Refills: 0 | Status: SHIPPED | OUTPATIENT
Start: 2023-07-28 | End: 2023-08-02

## 2023-08-01 ENCOUNTER — TELEPHONE (OUTPATIENT)
Dept: PULMONOLOGY | Age: 88
End: 2023-08-01

## 2023-08-01 DIAGNOSIS — J44.9 CHRONIC OBSTRUCTIVE PULMONARY DISEASE, UNSPECIFIED COPD TYPE (CMD): ICD-10-CM

## 2023-08-01 RX ORDER — PREDNISONE 10 MG/1
10 TABLET ORAL DAILY
Qty: 30 TABLET | Refills: 0 | Status: SHIPPED | OUTPATIENT
Start: 2023-08-01

## 2023-08-05 ENCOUNTER — IMAGING SERVICES (OUTPATIENT)
Dept: CT IMAGING | Age: 88
End: 2023-08-05
Attending: INTERNAL MEDICINE

## 2023-08-05 DIAGNOSIS — J44.9 CHRONIC OBSTRUCTIVE PULMONARY DISEASE, UNSPECIFIED COPD TYPE (CMD): ICD-10-CM

## 2023-08-05 DIAGNOSIS — R06.09 POST-COVID CHRONIC DYSPNEA: ICD-10-CM

## 2023-08-05 DIAGNOSIS — J40 BRONCHITIS: ICD-10-CM

## 2023-08-05 DIAGNOSIS — U09.9 POST-COVID CHRONIC DYSPNEA: ICD-10-CM

## 2023-08-05 PROCEDURE — 71250 CT THORAX DX C-: CPT | Performed by: RADIOLOGY

## 2023-09-01 DIAGNOSIS — J44.9 CHRONIC OBSTRUCTIVE PULMONARY DISEASE, UNSPECIFIED COPD TYPE (CMD): ICD-10-CM

## 2023-09-01 RX ORDER — PREDNISONE 10 MG/1
10 TABLET ORAL DAILY
Qty: 30 TABLET | Refills: 0 | Status: SHIPPED | OUTPATIENT
Start: 2023-09-01 | End: 2023-10-12 | Stop reason: SDUPTHER

## 2023-10-02 ENCOUNTER — EXTERNAL RECORD (OUTPATIENT)
Dept: HEALTH INFORMATION MANAGEMENT | Facility: OTHER | Age: 88
End: 2023-10-02

## 2023-10-12 DIAGNOSIS — J44.9 CHRONIC OBSTRUCTIVE PULMONARY DISEASE, UNSPECIFIED COPD TYPE (CMD): ICD-10-CM

## 2023-10-12 RX ORDER — PREDNISONE 10 MG/1
10 TABLET ORAL DAILY
Qty: 90 TABLET | Refills: 1 | Status: SHIPPED | OUTPATIENT
Start: 2023-10-12

## 2023-10-18 ENCOUNTER — TELEPHONE (OUTPATIENT)
Dept: CARDIOLOGY | Age: 88
End: 2023-10-18

## 2023-10-19 ENCOUNTER — TELEPHONE (OUTPATIENT)
Dept: PULMONOLOGY | Age: 88
End: 2023-10-19

## 2023-10-19 RX ORDER — FLUTICASONE PROPIONATE AND SALMETEROL 250; 50 UG/1; UG/1
1 POWDER RESPIRATORY (INHALATION) 2 TIMES DAILY
Qty: 60 EACH | Refills: 11 | Status: SHIPPED | OUTPATIENT
Start: 2023-10-19

## 2023-10-19 RX ORDER — PREDNISONE 20 MG/1
TABLET ORAL
COMMUNITY
Start: 2023-10-02

## 2023-10-19 RX ORDER — FLUTICASONE PROPIONATE AND SALMETEROL 250; 50 UG/1; UG/1
1 POWDER RESPIRATORY (INHALATION) 2 TIMES DAILY
Qty: 60 EACH | Refills: 11 | Status: SHIPPED | OUTPATIENT
Start: 2023-10-19 | End: 2023-10-19 | Stop reason: SDUPTHER

## 2023-10-20 ENCOUNTER — LAB SERVICES (OUTPATIENT)
Dept: LAB | Age: 88
End: 2023-10-20

## 2023-10-20 ENCOUNTER — OFFICE VISIT (OUTPATIENT)
Dept: PULMONOLOGY | Age: 88
End: 2023-10-20

## 2023-10-20 VITALS
SYSTOLIC BLOOD PRESSURE: 116 MMHG | OXYGEN SATURATION: 92 % | HEART RATE: 94 BPM | DIASTOLIC BLOOD PRESSURE: 64 MMHG | WEIGHT: 180.23 LBS | HEIGHT: 68 IN | RESPIRATION RATE: 18 BRPM | BODY MASS INDEX: 27.31 KG/M2

## 2023-10-20 DIAGNOSIS — J44.9 CHRONIC OBSTRUCTIVE PULMONARY DISEASE, UNSPECIFIED COPD TYPE (CMD): ICD-10-CM

## 2023-10-20 DIAGNOSIS — Z23 NEED FOR VACCINATION: ICD-10-CM

## 2023-10-20 DIAGNOSIS — J44.9 CHRONIC OBSTRUCTIVE PULMONARY DISEASE, UNSPECIFIED COPD TYPE (CMD): Primary | ICD-10-CM

## 2023-10-20 LAB
ALBUMIN SERPL-MCNC: 3.4 G/DL (ref 3.6–5.1)
ALBUMIN/GLOB SERPL: 1.2 {RATIO} (ref 1–2.4)
ALP SERPL-CCNC: 112 UNITS/L (ref 45–117)
ALT SERPL-CCNC: 20 UNITS/L
ANION GAP SERPL CALC-SCNC: 10 MMOL/L (ref 7–19)
AST SERPL-CCNC: 11 UNITS/L
BASOPHILS # BLD: 0.1 K/MCL (ref 0–0.3)
BASOPHILS NFR BLD: 1 %
BILIRUB SERPL-MCNC: 0.4 MG/DL (ref 0.2–1)
BUN SERPL-MCNC: 29 MG/DL (ref 6–20)
BUN/CREAT SERPL: 32 (ref 7–25)
CALCIUM SERPL-MCNC: 8.8 MG/DL (ref 8.4–10.2)
CHLORIDE SERPL-SCNC: 103 MMOL/L (ref 97–110)
CO2 SERPL-SCNC: 32 MMOL/L (ref 21–32)
CREAT SERPL-MCNC: 0.91 MG/DL (ref 0.67–1.17)
DEPRECATED RDW RBC: 48.7 FL (ref 39–50)
EGFRCR SERPLBLD CKD-EPI 2021: 82 ML/MIN/{1.73_M2}
EOSINOPHIL # BLD: 0.1 K/MCL (ref 0–0.5)
EOSINOPHIL NFR BLD: 1 %
ERYTHROCYTE [DISTWIDTH] IN BLOOD: 13.2 % (ref 11–15)
FASTING DURATION TIME PATIENT: ABNORMAL H
GLOBULIN SER-MCNC: 2.9 G/DL (ref 2–4)
GLUCOSE SERPL-MCNC: 93 MG/DL (ref 70–99)
HCT VFR BLD CALC: 41.7 % (ref 39–51)
HGB BLD-MCNC: 13.3 G/DL (ref 13–17)
IMM GRANULOCYTES # BLD AUTO: 0.1 K/MCL (ref 0–0.2)
IMM GRANULOCYTES # BLD: 1 %
LYMPHOCYTES # BLD: 1.5 K/MCL (ref 1–4)
LYMPHOCYTES NFR BLD: 14 %
MCH RBC QN AUTO: 31.8 PG (ref 26–34)
MCHC RBC AUTO-ENTMCNC: 31.9 G/DL (ref 32–36.5)
MCV RBC AUTO: 99.8 FL (ref 78–100)
MONOCYTES # BLD: 0.6 K/MCL (ref 0.3–0.9)
MONOCYTES NFR BLD: 6 %
NEUTROPHILS # BLD: 8.2 K/MCL (ref 1.8–7.7)
NEUTROPHILS NFR BLD: 77 %
NRBC BLD MANUAL-RTO: 0 /100 WBC
PLATELET # BLD AUTO: 236 K/MCL (ref 140–450)
POTASSIUM SERPL-SCNC: 4.8 MMOL/L (ref 3.4–5.1)
PROT SERPL-MCNC: 6.3 G/DL (ref 6.4–8.2)
RBC # BLD: 4.18 MIL/MCL (ref 4.5–5.9)
SODIUM SERPL-SCNC: 140 MMOL/L (ref 135–145)
WBC # BLD: 10.5 K/MCL (ref 4.2–11)

## 2023-10-20 PROCEDURE — 80053 COMPREHEN METABOLIC PANEL: CPT | Performed by: INTERNAL MEDICINE

## 2023-10-20 PROCEDURE — 36415 COLL VENOUS BLD VENIPUNCTURE: CPT | Performed by: INTERNAL MEDICINE

## 2023-10-20 PROCEDURE — 90662 IIV NO PRSV INCREASED AG IM: CPT | Performed by: INTERNAL MEDICINE

## 2023-10-20 PROCEDURE — G0008 ADMIN INFLUENZA VIRUS VAC: HCPCS | Performed by: INTERNAL MEDICINE

## 2023-10-20 PROCEDURE — 99214 OFFICE O/P EST MOD 30 MIN: CPT | Performed by: INTERNAL MEDICINE

## 2023-10-20 PROCEDURE — 85025 COMPLETE CBC W/AUTO DIFF WBC: CPT | Performed by: INTERNAL MEDICINE

## 2023-10-26 ENCOUNTER — TELEPHONE (OUTPATIENT)
Dept: PULMONOLOGY | Age: 88
End: 2023-10-26

## 2023-11-30 RX ORDER — FLUTICASONE PROPIONATE 50 MCG
2 SPRAY, SUSPENSION (ML) NASAL DAILY
Qty: 16 G | Refills: 3 | Status: SHIPPED | OUTPATIENT
Start: 2023-11-30

## 2024-01-02 ENCOUNTER — TELEPHONE (OUTPATIENT)
Dept: PULMONOLOGY | Age: 89
End: 2024-01-02

## 2024-01-03 ENCOUNTER — EXTERNAL RECORD (OUTPATIENT)
Dept: HEALTH INFORMATION MANAGEMENT | Facility: OTHER | Age: 89
End: 2024-01-03

## 2024-01-31 ENCOUNTER — APPOINTMENT (OUTPATIENT)
Dept: OPHTHALMOLOGY | Age: 89
End: 2024-01-31

## 2024-01-31 ENCOUNTER — OFFICE VISIT (OUTPATIENT)
Dept: OPHTHALMOLOGY | Age: 89
End: 2024-01-31

## 2024-01-31 DIAGNOSIS — H40.1113 PRIMARY OPEN ANGLE GLAUCOMA OF RIGHT EYE, SEVERE STAGE: Primary | ICD-10-CM

## 2024-01-31 DIAGNOSIS — H35.30 ARMD (AGE-RELATED MACULAR DEGENERATION), BILATERAL: ICD-10-CM

## 2024-01-31 DIAGNOSIS — Z96.1 PSEUDOPHAKIA OF BOTH EYES: ICD-10-CM

## 2024-01-31 RX ORDER — FLUOROMETHOLONE 0.1 %
1 SUSPENSION, DROPS(FINAL DOSAGE FORM)(ML) OPHTHALMIC (EYE) 2 TIMES DAILY
Qty: 1 EACH | Refills: 0 | Status: SHIPPED | OUTPATIENT
Start: 2024-01-31 | End: 2024-02-14

## 2024-01-31 RX ORDER — LATANOPROST 50 UG/ML
1 SOLUTION/ DROPS OPHTHALMIC NIGHTLY
Qty: 10 ML | Refills: 3 | Status: SHIPPED | OUTPATIENT
Start: 2024-01-31

## 2024-01-31 ASSESSMENT — VISUAL ACUITY
OS_CC: 20/30
CORRECTION_TYPE: GLASSES
OD_CC: 20/70
METHOD: SNELLEN - LINEAR
OS_CC+: +2

## 2024-01-31 ASSESSMENT — TONOMETRY
IOP_METHOD: APPLANATION
OS_IOP_MMHG: 17
OD_IOP_MMHG: 14

## 2024-01-31 ASSESSMENT — SLIT LAMP EXAM - LIDS
COMMENTS: NORMAL
COMMENTS: NORMAL

## 2024-01-31 ASSESSMENT — PACHYMETRY
OS_CT(UM): 566
OD_CT(UM): 548

## 2024-01-31 ASSESSMENT — EXTERNAL EXAM - RIGHT EYE: OD_EXAM: NORMAL

## 2024-01-31 ASSESSMENT — EXTERNAL EXAM - LEFT EYE: OS_EXAM: NORMAL

## 2024-02-02 ENCOUNTER — TELEPHONE (OUTPATIENT)
Dept: OPHTHALMOLOGY | Age: 89
End: 2024-02-02

## 2024-03-13 RX ORDER — FLUTICASONE PROPIONATE 50 MCG
2 SPRAY, SUSPENSION (ML) NASAL DAILY
Qty: 16 G | Refills: 3 | OUTPATIENT
Start: 2024-03-13

## 2024-03-26 ENCOUNTER — TELEPHONE (OUTPATIENT)
Dept: OTOLARYNGOLOGY | Age: 89
End: 2024-03-26

## 2024-03-26 DIAGNOSIS — J44.9 CHRONIC OBSTRUCTIVE PULMONARY DISEASE, UNSPECIFIED COPD TYPE (CMD): ICD-10-CM

## 2024-03-27 RX ORDER — PREDNISONE 10 MG/1
10 TABLET ORAL DAILY
Qty: 90 TABLET | Refills: 1 | Status: SHIPPED | OUTPATIENT
Start: 2024-03-27

## 2024-03-28 RX ORDER — FLUTICASONE PROPIONATE 50 MCG
2 SPRAY, SUSPENSION (ML) NASAL DAILY
Qty: 16 G | Refills: 3 | Status: SHIPPED | OUTPATIENT
Start: 2024-03-28

## 2024-04-04 ENCOUNTER — APPOINTMENT (OUTPATIENT)
Dept: UROLOGY | Age: 89
End: 2024-04-04

## 2024-04-04 VITALS — TEMPERATURE: 97.7 F | WEIGHT: 173 LBS | HEIGHT: 67 IN | BODY MASS INDEX: 27.15 KG/M2

## 2024-04-04 DIAGNOSIS — N13.8 BPH WITH OBSTRUCTION/LOWER URINARY TRACT SYMPTOMS: Primary | ICD-10-CM

## 2024-04-04 DIAGNOSIS — N39.41 URGE INCONTINENCE: ICD-10-CM

## 2024-04-04 DIAGNOSIS — N40.1 BPH WITH OBSTRUCTION/LOWER URINARY TRACT SYMPTOMS: Primary | ICD-10-CM

## 2024-04-04 LAB — BLDR SCAN MLS: NORMAL

## 2024-04-04 RX ORDER — HYDROCODONE BITARTRATE AND ACETAMINOPHEN 5; 325 MG/1; MG/1
1 TABLET ORAL EVERY 6 HOURS PRN
COMMUNITY

## 2024-04-04 RX ORDER — FINASTERIDE 5 MG/1
5 TABLET, FILM COATED ORAL DAILY
Qty: 90 TABLET | Refills: 3 | Status: SHIPPED | OUTPATIENT
Start: 2024-04-04

## 2024-04-04 RX ORDER — TOLTERODINE TARTRATE 2 MG/1
2 TABLET, EXTENDED RELEASE ORAL 2 TIMES DAILY
Qty: 180 TABLET | Refills: 3 | Status: SHIPPED | OUTPATIENT
Start: 2024-04-04

## 2024-04-04 RX ORDER — DOXAZOSIN 8 MG/1
8 TABLET ORAL NIGHTLY
Qty: 90 TABLET | Refills: 3 | Status: SHIPPED | OUTPATIENT
Start: 2024-04-04

## 2024-04-25 ENCOUNTER — APPOINTMENT (OUTPATIENT)
Dept: PULMONOLOGY | Age: 89
End: 2024-04-25

## 2024-04-25 VITALS
DIASTOLIC BLOOD PRESSURE: 60 MMHG | BODY MASS INDEX: 27.8 KG/M2 | WEIGHT: 177.14 LBS | HEART RATE: 85 BPM | OXYGEN SATURATION: 96 % | SYSTOLIC BLOOD PRESSURE: 112 MMHG | TEMPERATURE: 96.8 F | HEIGHT: 67 IN

## 2024-04-25 DIAGNOSIS — J43.2 CENTRILOBULAR EMPHYSEMA (CMD): Primary | ICD-10-CM

## 2024-05-02 ENCOUNTER — TELEPHONE (OUTPATIENT)
Dept: UROLOGY | Age: 89
End: 2024-05-02

## 2024-05-02 ENCOUNTER — APPOINTMENT (OUTPATIENT)
Dept: UROLOGY | Age: 89
End: 2024-05-02

## 2024-05-28 ENCOUNTER — E-ADVICE (OUTPATIENT)
Dept: OTHER | Age: 89
End: 2024-05-28

## 2024-06-03 ENCOUNTER — APPOINTMENT (OUTPATIENT)
Dept: UROLOGY | Age: 89
End: 2024-06-03

## 2024-06-03 ENCOUNTER — EXTERNAL RECORD (OUTPATIENT)
Dept: HEALTH INFORMATION MANAGEMENT | Facility: OTHER | Age: 89
End: 2024-06-03

## 2024-06-03 DIAGNOSIS — N13.8 BPH WITH OBSTRUCTION/LOWER URINARY TRACT SYMPTOMS: Primary | ICD-10-CM

## 2024-06-03 DIAGNOSIS — N40.1 BPH WITH OBSTRUCTION/LOWER URINARY TRACT SYMPTOMS: Primary | ICD-10-CM

## 2024-06-03 DIAGNOSIS — N39.41 URGE INCONTINENCE: ICD-10-CM

## 2024-06-03 LAB — BLDR SCAN MLS: NORMAL

## 2024-06-03 RX ORDER — SULFAMETHOXAZOLE AND TRIMETHOPRIM 800; 160 MG/1; MG/1
1 TABLET ORAL 2 TIMES DAILY
Qty: 2 TABLET | Refills: 0 | Status: SHIPPED | OUTPATIENT
Start: 2024-06-03 | End: 2024-06-04

## 2024-06-05 ENCOUNTER — TELEPHONE (OUTPATIENT)
Dept: UROLOGY | Age: 89
End: 2024-06-05

## 2024-06-24 ENCOUNTER — OFFICE VISIT (OUTPATIENT)
Dept: SURGERY | Facility: CLINIC | Age: 89
End: 2024-06-24

## 2024-06-24 ENCOUNTER — TELEPHONE (OUTPATIENT)
Dept: SURGERY | Facility: CLINIC | Age: 89
End: 2024-06-24

## 2024-06-24 DIAGNOSIS — N13.8 BPH WITH OBSTRUCTION/LOWER URINARY TRACT SYMPTOMS: Primary | ICD-10-CM

## 2024-06-24 DIAGNOSIS — N40.1 BPH WITH OBSTRUCTION/LOWER URINARY TRACT SYMPTOMS: Primary | ICD-10-CM

## 2024-06-24 PROCEDURE — 99204 OFFICE O/P NEW MOD 45 MIN: CPT | Performed by: SURGERY

## 2024-06-24 RX ORDER — BENZONATATE 200 MG/1
CAPSULE ORAL
COMMUNITY
Start: 2024-01-03

## 2024-06-24 RX ORDER — OXYMETAZOLINE HYDROCHLORIDE 0.05 G/100ML
2 SPRAY NASAL 2 TIMES DAILY
COMMUNITY

## 2024-06-24 RX ORDER — LATANOPROST 50 UG/ML
1 SOLUTION/ DROPS OPHTHALMIC NIGHTLY
COMMUNITY
Start: 2024-01-31

## 2024-06-24 NOTE — TELEPHONE ENCOUNTER
Hi,    Can you please schedule this patient for surgery.    Needs CBC, BMP, PT/INR, urine culture 1-2 weeks prior.    Needs to hold aspirin for 7 days prior.    Needs cardiology, pulmonology, and PCP clearance for surgery.    Thanks,  MB    Urology Surgery Request  Surgeon: Alok Sims  Location (if known): [facility]  Procedure: Transurethral resection of prostate  Anesthesia: General   Time Frame: Next available  Time required: 90 minutes  Diagnosis: BPH  Special Equipment: Bipolar resectoscope    Antibiotics: per hospital protocol unless checked below   ___ Levaquin 500 mg IV   ___ Gemcitabine 2 g/100 mL NS bladder instillation to be given in OR    Estimated Post Op/Follow Up Appt:   4 weeks for postop. Please schedule appointment at time of surgery scheduling.

## 2024-06-24 NOTE — PROGRESS NOTES
Urology Clinic Note - New Patient    Referring Provider:  No referring provider defined for this encounter.     Primary Care Provider:  Tom Echeverria     Chief Complaint:   BPH    HPI:   Tung Maria is a 88 year old male with history of COPD on home oxygen at night, smoking, AAA referred for BPH/LUTS.  He is currently on doxazosin 1 mg nightly, tolterodine, and finasteride.    He is followed by Dr. Boston ([facility]).  Recently on 6/3/2024 he performed cystoscopy that showed a moderately trabeculated bladder with small diverticuli.  Bladder outlet appeared obstructed by significant lateral lobe hypertrophy.  Transrectal ultrasound measured a 30 cc prostate.  PVR 27 mL.  UroCuff testing showed stuttering low flow with high pressure obstructive voiding pattern.  Maximum flow 9.9 mL/s.  He elected to proceed with UroLift.    He continues to have bothersome weak urinary stream, urgency, frequency despite above medications.  I again discussed options of various surgical treatments.  He has severe COPD and is on home oxygen.  I discussed that a less invasive procedure such as UroLift could help with symptoms.  He is more interested in undergoing TURP in the operating room under general versus spinal.  I discussed that TURP would likely make his obstructive LUTS better but may not improve his overactive symptoms.    No recent PSA testing.    AUA symptom score is 28/6 with LUTS.    Post-void residual bladder scan: 258 mL (last void 10 minutes prior)    Urinalysis (clinic dip): None provided    PSA:  No results found for: \"PSA\", \"PERCENTPSA\", \"PSAS\", \"PSAULTRA\", \"QPSA\", \"PSATOT\", \"TOTPSADX\", \"TOTPSASCREEN\"     History:     Past Medical History:    AAA (abdominal aortic aneurysm) (HCC)    COPD (chronic obstructive pulmonary disease) (HCC)    Tobacco use       Past Surgical History:   Procedure Laterality Date    Hernia repair      Knee replacement surgery         No family history on file.    Social History      Socioeconomic History    Marital status:    Tobacco Use    Smoking status: Every Day     Current packs/day: 1.00     Average packs/day: 1 pack/day for 60.0 years (60.0 ttl pk-yrs)     Types: Cigarettes    Smokeless tobacco: Never    Tobacco comments:     60 PACK YEARS   Vaping Use    Vaping status: Never Used   Substance and Sexual Activity    Alcohol use: No    Drug use: No     Social Determinants of Health      Received from [facility]    Social Connections    Received from [facility]    Housing Stability       Medications (Active prior to today's visit):  Current Outpatient Medications   Medication Sig Dispense Refill    benzonatate 200 MG Oral Cap TAKE 1 CAPSULE BY MOUTH THREE TIMES DAILY FOR 5 DAYS FOR COUGH      budesonide 32 MCG/ACT Nasal Suspension 1 spray by Nasal route 2 (two) times daily.      latanoprost 0.005 % Ophthalmic Solution Apply 1 drop to eye nightly.      oxymetazoline 0.05 % Nasal Solution 2 sprays 2 (two) times daily.      furosemide 20 MG Oral Tab Take 1 tablet (20 mg total) by mouth daily.  0    predniSONE 10 MG Oral Tab Take 1 tablet (10 mg total) by mouth daily.      aspirin 81 MG Oral Tab EC Take 1 tablet (81 mg total) by mouth daily.      doxazosin 1 MG Oral Tab Take 1 tablet (1 mg total) by mouth nightly.      tolterodine 2 MG Oral Tab Take 1 tablet (2 mg total) by mouth 2 (two) times daily.      finasteride 5 MG Oral Tab Take 1 tablet (5 mg total) by mouth daily.      ALBUTEROL IN 2 puffs BID      SPIRIVA HANDIHALER 18 MCG IN CAPS 1 QAM      TYLENOL EXTRA STRENGTH 500 MG OR TABS 1 TABLET PRN      SUDAFED OR PRN      ADVAIR DISKUS 250-50 MCG/DOSE IN MISC 1 PUFF BID 1 DISKUS 5       Allergies:  Allergies   Allergen Reactions    Ciprofloxacin OTHER (SEE COMMENTS)     Heel pain   Heel pain    Heel pain    Heel pain   Heel pain   Heel pain    Ibuprofen ARRHYTHMIA    Methocarbamol OTHER (SEE COMMENTS)     Pt states causes headache   Pt  states causes headache   Pt states causes headache   Pt states causes headache   Pt states causes headache   Pt states causes headache    Pt states causes headache   Pt states causes headache   Pt states causes headache      Pt states causes headache   Pt states causes headache   Pt states causes headache   Pt states causes headache   Pt states causes headache   Pt states causes headache    Pt states causes headache   Pt states causes headache    Strawberry NAUSEA AND VOMITING    Flexeril [Cyclobenzaprine Hcl]      Pt states causes Headache    Mg Hydroxide-Quinapril Hcl     Pcn [Penicillins] RASH         Review of Systems:   A comprehensive 10-point review of systems was completed.  Pertinent positives and negatives are noted in the the HPI.    Physical Exam:   CONSTITUTIONAL: Well developed, well nourished, in no acute distress  NEUROLOGIC: Alert and oriented  HEAD: Normocephalic, atraumatic  EYES: Sclera non-icteric  ENT: Hearing intact, moist mucous membranes  NECK: No obvious goiter or masses  RESPIRATORY: Normal respiratory effort  SKIN: No evident rashes  ABDOMEN: Soft, non-tender, non-distended  DIGITAL RECTAL EXAM: ~45 gram prostate, no nodules or tenderness    Assessment & Plan:   Tung Maria is a 88 year old male with history of COPD, smoking, AAA referred for BPH/LUTS.  He is currently on doxazosin 1 mg nightly, tolterodine, and finasteride.    He is followed by Dr. Boston ([facility]).  Recently on 6/3/2024 he performed cystoscopy that showed a moderately trabeculated bladder with small diverticuli.  Bladder outlet appeared obstructed by significant lateral lobe hypertrophy.  Transrectal ultrasound measured a 30 cc prostate.  PVR 27 mL.  UroCuff testing showed stuttering low flow with high pressure obstructive voiding pattern.  Maximum flow 9.9 mL/s.  He elected to proceed with UroLift.    He continues to have bothersome weak urinary stream, urgency, frequency despite above medications.  I again  discussed options of various surgical treatments.  He has severe COPD and is on home oxygen.  I discussed that a less invasive procedure such as UroLift could help with symptoms.  He is more interested in undergoing TURP in the operating room under general versus spinal.  I discussed that TURP would likely make his obstructive LUTS better but may not improve his overactive symptoms.    -OR for TURP  -Will need cardiology, pulmonology, PCP clearance  -Will need to hold aspirin for 7 days prior      Thank you for this consult.    I have personally reviewed all relevant medical records, labs, and imaging.    Medical Decision Making  BPH/LUTS: Undiagnosed new problem    Amount and/or Complexity of Data Reviewed  External Data Reviewed: labs and notes.    Risk  Minor surgery with identified risk factors.      Alok Sims MD  Staff Urologist  [facility]  Office: [phone]

## 2024-06-25 ENCOUNTER — TELEPHONE (OUTPATIENT)
Dept: PULMONOLOGY | Age: 89
End: 2024-06-25

## 2024-07-10 RX ORDER — FLUTICASONE PROPIONATE 50 MCG
2 SPRAY, SUSPENSION (ML) NASAL DAILY
Qty: 16 G | Refills: 3 | Status: SHIPPED | OUTPATIENT
Start: 2024-07-10

## 2024-07-10 RX ORDER — FLUTICASONE PROPIONATE 50 MCG
2 SPRAY, SUSPENSION (ML) NASAL DAILY
Qty: 16 G | Refills: 3 | OUTPATIENT
Start: 2024-07-10

## 2024-07-16 ENCOUNTER — TELEPHONE (OUTPATIENT)
Dept: SURGERY | Facility: CLINIC | Age: 89
End: 2024-07-16

## 2024-07-16 DIAGNOSIS — N39.0 RECURRENT UTI: Primary | ICD-10-CM

## 2024-07-16 DIAGNOSIS — N40.1 BPH W URINARY OBS/LUTS: ICD-10-CM

## 2024-07-16 DIAGNOSIS — R82.3 HEMOGLOBINURIA: ICD-10-CM

## 2024-07-16 DIAGNOSIS — N13.8 BPH W URINARY OBS/LUTS: ICD-10-CM

## 2024-07-16 DIAGNOSIS — Z01.812 PRE-OPERATIVE LABORATORY EXAMINATION: ICD-10-CM

## 2024-07-16 NOTE — TELEPHONE ENCOUNTER
Patient's wife calling back because they would like the urine and blood test orders to be sent to his primary care doctor.  at [facility] (ph # [phone]). Please call.

## 2024-07-22 ENCOUNTER — IMAGING SERVICES (OUTPATIENT)
Dept: GENERAL RADIOLOGY | Age: 89
End: 2024-07-22
Attending: INTERNAL MEDICINE

## 2024-07-22 ENCOUNTER — APPOINTMENT (OUTPATIENT)
Dept: PULMONOLOGY | Age: 89
End: 2024-07-22

## 2024-07-22 VITALS
BODY MASS INDEX: 27.34 KG/M2 | DIASTOLIC BLOOD PRESSURE: 50 MMHG | TEMPERATURE: 97.5 F | WEIGHT: 174.16 LBS | HEIGHT: 67 IN | OXYGEN SATURATION: 95 % | HEART RATE: 99 BPM | SYSTOLIC BLOOD PRESSURE: 110 MMHG

## 2024-07-22 DIAGNOSIS — Z01.818 PRE-OP TESTING: ICD-10-CM

## 2024-07-22 DIAGNOSIS — Z01.818 PRE-OP TESTING: Primary | ICD-10-CM

## 2024-07-22 PROCEDURE — 71046 X-RAY EXAM CHEST 2 VIEWS: CPT | Performed by: RADIOLOGY

## 2024-07-22 PROCEDURE — 99214 OFFICE O/P EST MOD 30 MIN: CPT | Performed by: INTERNAL MEDICINE

## 2024-07-23 ENCOUNTER — TELEPHONE (OUTPATIENT)
Dept: SURGERY | Facility: CLINIC | Age: 89
End: 2024-07-23

## 2024-07-25 DIAGNOSIS — J44.9 CHRONIC OBSTRUCTIVE PULMONARY DISEASE, UNSPECIFIED COPD TYPE  (CMD): ICD-10-CM

## 2024-07-25 RX ORDER — PREDNISONE 10 MG/1
10 TABLET ORAL DAILY
Qty: 30 TABLET | Refills: 0 | Status: SHIPPED | OUTPATIENT
Start: 2024-07-25

## 2024-08-01 ENCOUNTER — EXTERNAL LAB (OUTPATIENT)
Dept: HEALTH INFORMATION MANAGEMENT | Facility: OTHER | Age: 89
End: 2024-08-01

## 2024-08-01 LAB
APTT PPP: 29 SEC (ref 23–32)
INR PPP: 1 (ref 0.9–1.1)
PROTHROMBIN TIME: 11.1 SEC (ref 9–11.5)

## 2024-08-04 ENCOUNTER — HOSPITAL ENCOUNTER (INPATIENT)
Facility: HOSPITAL | Age: 89
LOS: 1 days | Discharge: LEFT BEFORE DISCHARGE | End: 2024-08-05
Attending: EMERGENCY MEDICINE | Admitting: STUDENT IN AN ORGANIZED HEALTH CARE EDUCATION/TRAINING PROGRAM
Payer: MEDICARE

## 2024-08-04 ENCOUNTER — APPOINTMENT (OUTPATIENT)
Dept: GENERAL RADIOLOGY | Facility: HOSPITAL | Age: 89
End: 2024-08-04
Attending: EMERGENCY MEDICINE
Payer: MEDICARE

## 2024-08-04 DIAGNOSIS — R06.02 SHORTNESS OF BREATH: Primary | ICD-10-CM

## 2024-08-04 LAB
ALBUMIN SERPL-MCNC: 4.3 G/DL (ref 3.2–4.8)
ALBUMIN/GLOB SERPL: 1.5 {RATIO} (ref 1–2)
ALP LIVER SERPL-CCNC: 81 U/L
ALT SERPL-CCNC: 11 U/L
ANION GAP SERPL CALC-SCNC: 3 MMOL/L (ref 0–18)
AST SERPL-CCNC: 10 U/L (ref ?–34)
BASOPHILS # BLD AUTO: 0.04 X10(3) UL (ref 0–0.2)
BASOPHILS NFR BLD AUTO: 0.3 %
BILIRUB SERPL-MCNC: 0.4 MG/DL (ref 0.2–1.1)
BUN BLD-MCNC: 16 MG/DL (ref 9–23)
CALCIUM BLD-MCNC: 9.4 MG/DL (ref 8.7–10.4)
CHLORIDE SERPL-SCNC: 104 MMOL/L (ref 98–112)
CO2 SERPL-SCNC: 31 MMOL/L (ref 21–32)
CREAT BLD-MCNC: 0.9 MG/DL
D DIMER PPP FEU-MCNC: 0.38 UG/ML FEU (ref ?–0.88)
EGFRCR SERPLBLD CKD-EPI 2021: 82 ML/MIN/1.73M2 (ref 60–?)
EOSINOPHIL # BLD AUTO: 0 X10(3) UL (ref 0–0.7)
EOSINOPHIL NFR BLD AUTO: 0 %
ERYTHROCYTE [DISTWIDTH] IN BLOOD BY AUTOMATED COUNT: 12.7 %
FLUAV + FLUBV RNA SPEC NAA+PROBE: NEGATIVE
FLUAV + FLUBV RNA SPEC NAA+PROBE: NEGATIVE
GLOBULIN PLAS-MCNC: 2.9 G/DL (ref 2–3.5)
GLUCOSE BLD-MCNC: 189 MG/DL (ref 70–99)
HCT VFR BLD AUTO: 41 %
HGB BLD-MCNC: 13.4 G/DL
IMM GRANULOCYTES # BLD AUTO: 0.17 X10(3) UL (ref 0–1)
IMM GRANULOCYTES NFR BLD: 1.2 %
LYMPHOCYTES # BLD AUTO: 0.75 X10(3) UL (ref 1–4)
LYMPHOCYTES NFR BLD AUTO: 5.3 %
MCH RBC QN AUTO: 31.4 PG (ref 26–34)
MCHC RBC AUTO-ENTMCNC: 32.7 G/DL (ref 31–37)
MCV RBC AUTO: 96 FL
MONOCYTES # BLD AUTO: 0.19 X10(3) UL (ref 0.1–1)
MONOCYTES NFR BLD AUTO: 1.3 %
NEUTROPHILS # BLD AUTO: 13.03 X10 (3) UL (ref 1.5–7.7)
NEUTROPHILS # BLD AUTO: 13.03 X10(3) UL (ref 1.5–7.7)
NEUTROPHILS NFR BLD AUTO: 91.9 %
NT-PROBNP SERPL-MCNC: 225 PG/ML (ref ?–450)
OSMOLALITY SERPL CALC.SUM OF ELEC: 292 MOSM/KG (ref 275–295)
PLATELET # BLD AUTO: 280 10(3)UL (ref 150–450)
POTASSIUM SERPL-SCNC: 4.6 MMOL/L (ref 3.5–5.1)
PROT SERPL-MCNC: 7.2 G/DL (ref 5.7–8.2)
RBC # BLD AUTO: 4.27 X10(6)UL
RSV RNA SPEC NAA+PROBE: NEGATIVE
SARS-COV-2 RNA RESP QL NAA+PROBE: NOT DETECTED
SODIUM SERPL-SCNC: 138 MMOL/L (ref 136–145)
TROPONIN I SERPL HS-MCNC: 5 NG/L
WBC # BLD AUTO: 14.2 X10(3) UL (ref 4–11)

## 2024-08-04 PROCEDURE — 99223 1ST HOSP IP/OBS HIGH 75: CPT | Performed by: HOSPITALIST

## 2024-08-04 PROCEDURE — 71045 X-RAY EXAM CHEST 1 VIEW: CPT | Performed by: EMERGENCY MEDICINE

## 2024-08-04 RX ORDER — FUROSEMIDE 10 MG/ML
40 INJECTION INTRAMUSCULAR; INTRAVENOUS ONCE
Status: COMPLETED | OUTPATIENT
Start: 2024-08-04 | End: 2024-08-04

## 2024-08-04 RX ORDER — ALBUTEROL SULFATE 2.5 MG/3ML
SOLUTION RESPIRATORY (INHALATION) EVERY 6 HOURS PRN
COMMUNITY

## 2024-08-04 RX ORDER — FLUTICASONE PROPIONATE 50 MCG
2 SPRAY, SUSPENSION (ML) NASAL DAILY
COMMUNITY
Start: 2024-07-24

## 2024-08-05 ENCOUNTER — TELEPHONE (OUTPATIENT)
Dept: SURGERY | Facility: CLINIC | Age: 89
End: 2024-08-05

## 2024-08-05 VITALS
TEMPERATURE: 99 F | WEIGHT: 166.88 LBS | HEIGHT: 67 IN | OXYGEN SATURATION: 94 % | DIASTOLIC BLOOD PRESSURE: 49 MMHG | BODY MASS INDEX: 26.19 KG/M2 | RESPIRATION RATE: 20 BRPM | HEART RATE: 96 BPM | SYSTOLIC BLOOD PRESSURE: 128 MMHG

## 2024-08-05 LAB
ATRIAL RATE: 99 BPM
BILIRUB UR QL STRIP.AUTO: NEGATIVE
CLARITY UR REFRACT.AUTO: CLEAR
EST. AVERAGE GLUCOSE BLD GHB EST-MCNC: 126 MG/DL (ref 68–126)
GLUCOSE BLD-MCNC: 131 MG/DL (ref 70–99)
GLUCOSE BLD-MCNC: 205 MG/DL (ref 70–99)
GLUCOSE UR STRIP.AUTO-MCNC: NORMAL MG/DL
HBA1C MFR BLD: 6 % (ref ?–5.7)
HYALINE CASTS #/AREA URNS AUTO: PRESENT /LPF
KETONES UR STRIP.AUTO-MCNC: NEGATIVE MG/DL
LEUKOCYTE ESTERASE UR QL STRIP.AUTO: 75
NITRITE UR QL STRIP.AUTO: NEGATIVE
P AXIS: 74 DEGREES
P-R INTERVAL: 114 MS
PH UR STRIP.AUTO: 6 [PH] (ref 5–8)
PROT UR STRIP.AUTO-MCNC: NEGATIVE MG/DL
Q-T INTERVAL: 332 MS
QRS DURATION: 92 MS
QTC CALCULATION (BEZET): 426 MS
R AXIS: 49 DEGREES
SP GR UR STRIP.AUTO: 1.02 (ref 1–1.03)
T AXIS: 70 DEGREES
UROBILINOGEN UR STRIP.AUTO-MCNC: NORMAL MG/DL
VENTRICULAR RATE: 99 BPM

## 2024-08-05 PROCEDURE — 99239 HOSP IP/OBS DSCHRG MGMT >30: CPT | Performed by: HOSPITALIST

## 2024-08-05 RX ORDER — MELATONIN
3 NIGHTLY PRN
Status: DISCONTINUED | OUTPATIENT
Start: 2024-08-05 | End: 2024-08-05

## 2024-08-05 RX ORDER — METOCLOPRAMIDE HYDROCHLORIDE 5 MG/ML
10 INJECTION INTRAMUSCULAR; INTRAVENOUS EVERY 8 HOURS PRN
Status: DISCONTINUED | OUTPATIENT
Start: 2024-08-05 | End: 2024-08-05

## 2024-08-05 RX ORDER — PREDNISONE 10 MG/1
40 TABLET ORAL DAILY
Qty: 16 TABLET | Refills: 0 | Status: SHIPPED | OUTPATIENT
Start: 2024-08-06 | End: 2024-08-10

## 2024-08-05 RX ORDER — PREDNISONE 10 MG/1
40 TABLET ORAL DAILY
Qty: 16 TABLET | Refills: 0 | Status: SHIPPED | OUTPATIENT
Start: 2024-08-06 | End: 2024-08-05

## 2024-08-05 RX ORDER — SENNOSIDES 8.6 MG
17.2 TABLET ORAL NIGHTLY PRN
Status: DISCONTINUED | OUTPATIENT
Start: 2024-08-05 | End: 2024-08-05

## 2024-08-05 RX ORDER — DEXTROSE MONOHYDRATE 25 G/50ML
50 INJECTION, SOLUTION INTRAVENOUS
Status: DISCONTINUED | OUTPATIENT
Start: 2024-08-05 | End: 2024-08-05

## 2024-08-05 RX ORDER — ECHINACEA PURPUREA EXTRACT 125 MG
1 TABLET ORAL
Status: DISCONTINUED | OUTPATIENT
Start: 2024-08-05 | End: 2024-08-05

## 2024-08-05 RX ORDER — AZITHROMYCIN 250 MG/1
500 TABLET, FILM COATED ORAL DAILY
Qty: 4 TABLET | Refills: 0 | Status: SHIPPED | OUTPATIENT
Start: 2024-08-06 | End: 2024-08-08

## 2024-08-05 RX ORDER — POLYETHYLENE GLYCOL 3350 17 G/17G
17 POWDER, FOR SOLUTION ORAL DAILY PRN
Status: DISCONTINUED | OUTPATIENT
Start: 2024-08-05 | End: 2024-08-05

## 2024-08-05 RX ORDER — PREDNISONE 10 MG/1
10 TABLET ORAL DAILY
Status: SHIPPED | COMMUNITY
Start: 2024-08-10 | End: 2024-08-05

## 2024-08-05 RX ORDER — FINASTERIDE 5 MG/1
5 TABLET, FILM COATED ORAL NIGHTLY
Status: DISCONTINUED | OUTPATIENT
Start: 2024-08-05 | End: 2024-08-05

## 2024-08-05 RX ORDER — NICOTINE POLACRILEX 4 MG
15 LOZENGE BUCCAL
Status: DISCONTINUED | OUTPATIENT
Start: 2024-08-05 | End: 2024-08-05

## 2024-08-05 RX ORDER — FLUTICASONE PROPIONATE AND SALMETEROL 250; 50 UG/1; UG/1
1 POWDER RESPIRATORY (INHALATION) 2 TIMES DAILY
Status: DISCONTINUED | OUTPATIENT
Start: 2024-08-05 | End: 2024-08-05

## 2024-08-05 RX ORDER — PREDNISONE 10 MG/1
10 TABLET ORAL DAILY
Status: ON HOLD | COMMUNITY
End: 2024-08-05

## 2024-08-05 RX ORDER — FUROSEMIDE 20 MG/1
20 TABLET ORAL DAILY
Status: DISCONTINUED | OUTPATIENT
Start: 2024-08-05 | End: 2024-08-05

## 2024-08-05 RX ORDER — DONEPEZIL HYDROCHLORIDE 5 MG/1
5 TABLET, FILM COATED ORAL DAILY
Status: DISCONTINUED | OUTPATIENT
Start: 2024-08-05 | End: 2024-08-05

## 2024-08-05 RX ORDER — LATANOPROST 50 UG/ML
1 SOLUTION/ DROPS OPHTHALMIC NIGHTLY
Status: DISCONTINUED | OUTPATIENT
Start: 2024-08-05 | End: 2024-08-05

## 2024-08-05 RX ORDER — ENOXAPARIN SODIUM 100 MG/ML
40 INJECTION SUBCUTANEOUS DAILY
Status: DISCONTINUED | OUTPATIENT
Start: 2024-08-05 | End: 2024-08-05

## 2024-08-05 RX ORDER — ONDANSETRON 2 MG/ML
4 INJECTION INTRAMUSCULAR; INTRAVENOUS EVERY 6 HOURS PRN
Status: DISCONTINUED | OUTPATIENT
Start: 2024-08-05 | End: 2024-08-05

## 2024-08-05 RX ORDER — ASPIRIN 81 MG/1
81 TABLET ORAL NIGHTLY
Status: DISCONTINUED | OUTPATIENT
Start: 2024-08-05 | End: 2024-08-05

## 2024-08-05 RX ORDER — IPRATROPIUM BROMIDE AND ALBUTEROL SULFATE 2.5; .5 MG/3ML; MG/3ML
3 SOLUTION RESPIRATORY (INHALATION)
Status: DISCONTINUED | OUTPATIENT
Start: 2024-08-05 | End: 2024-08-05

## 2024-08-05 RX ORDER — METHYLPREDNISOLONE SODIUM SUCCINATE 40 MG/ML
40 INJECTION, POWDER, LYOPHILIZED, FOR SOLUTION INTRAMUSCULAR; INTRAVENOUS EVERY 8 HOURS
Status: DISCONTINUED | OUTPATIENT
Start: 2024-08-05 | End: 2024-08-05

## 2024-08-05 RX ORDER — OXYBUTYNIN CHLORIDE 5 MG/1
5 TABLET ORAL 2 TIMES DAILY
Status: DISCONTINUED | OUTPATIENT
Start: 2024-08-05 | End: 2024-08-05

## 2024-08-05 RX ORDER — TERAZOSIN 1 MG/1
1 CAPSULE ORAL NIGHTLY
Status: DISCONTINUED | OUTPATIENT
Start: 2024-08-05 | End: 2024-08-05

## 2024-08-05 RX ORDER — ACETAMINOPHEN 500 MG
1000 TABLET ORAL EVERY 4 HOURS PRN
Status: DISCONTINUED | OUTPATIENT
Start: 2024-08-05 | End: 2024-08-05

## 2024-08-05 RX ORDER — NICOTINE POLACRILEX 4 MG
30 LOZENGE BUCCAL
Status: DISCONTINUED | OUTPATIENT
Start: 2024-08-05 | End: 2024-08-05

## 2024-08-05 RX ORDER — FLUTICASONE PROPIONATE 50 MCG
2 SPRAY, SUSPENSION (ML) NASAL DAILY
Status: DISCONTINUED | OUTPATIENT
Start: 2024-08-05 | End: 2024-08-05

## 2024-08-05 RX ORDER — FINASTERIDE 5 MG/1
5 TABLET, FILM COATED ORAL DAILY
Status: DISCONTINUED | OUTPATIENT
Start: 2024-08-05 | End: 2024-08-05

## 2024-08-05 RX ORDER — PREDNISONE 10 MG/1
10 TABLET ORAL DAILY
Status: SHIPPED | COMMUNITY
Start: 2024-08-10

## 2024-08-05 RX ORDER — BISACODYL 10 MG
10 SUPPOSITORY, RECTAL RECTAL
Status: DISCONTINUED | OUTPATIENT
Start: 2024-08-05 | End: 2024-08-05

## 2024-08-05 RX ORDER — ENEMA 19; 7 G/133ML; G/133ML
1 ENEMA RECTAL ONCE AS NEEDED
Status: DISCONTINUED | OUTPATIENT
Start: 2024-08-05 | End: 2024-08-05

## 2024-08-05 RX ORDER — AZITHROMYCIN 250 MG/1
500 TABLET, FILM COATED ORAL
Status: DISCONTINUED | OUTPATIENT
Start: 2024-08-05 | End: 2024-08-05

## 2024-08-05 NOTE — ED PROVIDER NOTES
Patient Seen in: [facility] Emergency Department      History     Chief Complaint   Patient presents with    Difficulty Breathing     Stated Complaint: nitin, wears 3l nc at all times, arrives with no oxygen on, spo2 88%    Subjective:   HPI    Patient with a history of severe COPD still smoking with previous admissions for COPD exacerbations in the past.  Patient also treated with diuretics.  Patient reports that his breathing has become more short the last few days.  He denies feeling like he is coming down with a cold.  No fever.  He has had some cough.  He notes his legs are little more swollen than usual.  He thinks this is related to the prednisone.  He took additional prednisone just this morning.  No chest pain.  No painful breathing.  No abdominal pain.  Patient is scheduled for surgery on his prostate tomorrow morning.      Objective:   Past Medical History:    AAA (abdominal aortic aneurysm) (HCC)    Asthma (HCC)    COPD (chronic obstructive pulmonary disease) (HCC)    Osteoarthritis    Problems with swallowing    Tobacco use    Visual impairment              Past Surgical History:   Procedure Laterality Date    Arthroscopy of joint unlisted Left     Hernia repair                  Social History     Socioeconomic History    Marital status:    Tobacco Use    Smoking status: Every Day     Current packs/day: 1.00     Average packs/day: 1 pack/day for 60.0 years (60.0 ttl pk-yrs)     Types: Cigarettes    Smokeless tobacco: Never    Tobacco comments:     60 PACK YEARS- 0.5 daily   Vaping Use    Vaping status: Never Used   Substance and Sexual Activity    Alcohol use: Not Currently    Drug use: No     Social Determinants of Health      Received from [facility]    Social Connections    Received from [facility]    Housing Stability              Review of Systems    Positive for stated Chief Complaint: Difficulty Breathing    Other systems are as noted in  HPI.  Constitutional and vital signs reviewed.      All other systems reviewed and negative except as noted above.    Physical Exam     ED Triage Vitals [08/04/24 1905]   /89   Pulse 110   Resp 20   Temp 98.9 °F (37.2 °C)   Temp src Oral   SpO2 94 %   O2 Device Nasal cannula       Current Vitals:   Vital Signs  BP: 155/89  Pulse: 100  Resp: 18  Temp: 98.9 °F (37.2 °C)  Temp src: Oral    Oxygen Therapy  SpO2: 97 %  O2 Device: None (Room air)  O2 Flow Rate (L/min): 3 L/min            Physical Exam  General: The patient is awake, alert, conversant.  He is dyspneic in conversation and mildly tachycardic.  Eyes: sclera white, conjunctiva pink and moist.  Lids and lashes are normal.  Oromucosa lips are dry  Neck: With no JVD  Lungs: Diminished with prolonged expiration to auscultation bilaterally.  No rhonchi or rales.  Heart: Normal S1 and S2, without murmur.  Distal pulses are strong and symmetric.  Abdomen: Soft, nondistended.  Completely nontender  Extremities: Unremarkable.  Calves nonswollen, symmetric, nontender.  1+ ankle edema  Skin: Somewhat pale  Neurologic:  Mental status as above.  Patient moves all extremities with good strength and coordination.           ED Course     Labs Reviewed   COMP METABOLIC PANEL (14) - Abnormal; Notable for the following components:       Result Value    Glucose 189 (*)     All other components within normal limits   CBC W/ DIFFERENTIAL - Abnormal; Notable for the following components:    WBC 14.2 (*)     Neutrophil Absolute Prelim 13.03 (*)     Neutrophil Absolute 13.03 (*)     Lymphocyte Absolute 0.75 (*)     All other components within normal limits   TROPONIN I HIGH SENSITIVITY - Normal   D-DIMER - Normal   PRO BETA NATRIURETIC PEPTIDE - Normal   SARS-COV-2/FLU A AND B/RSV BY PCR (GENEXPERT) - Normal    Narrative:     This test is intended for the qualitative detection and differentiation of SARS-CoV-2, influenza A, influenza B, and respiratory syncytial virus (RSV)  viral RNA in nasopharyngeal or nares swabs from individuals suspected of respiratory viral infection consistent with COVID-19 by their healthcare provider. Signs and symptoms of respiratory viral infection due to SARS-CoV-2, influenza, and RSV can be similar.    Test performed using the Xpert Xpress SARS-CoV-2/FLU/RSV (real time RT-PCR)  assay on the GeneXpert instrument, [employer], [place] [address].   This test is being used under the Food and Drug Administration's Emergency Use Authorization.    The authorized Fact Sheet for Healthcare Providers for this assay is available upon request from the laboratory.   CBC WITH DIFFERENTIAL WITH PLATELET    Narrative:     The following orders were created for panel order CBC With Differential With Platelet.  Procedure                               Abnormality         Status                     ---------                               -----------         ------                     CBC W/ DIFFERENTIAL[[id number]]          Abnormal            Final result                 Please view results for these tests on the individual orders.   URINALYSIS, ROUTINE   RAINBOW DRAW BLUE             From his doctor's office visit last month    Patient states is getting TURP done in 2 weeks at [facility] for preop eval .  Patient saw Cards also for the same    Overall respiratory status appears relatively stable.  Sats 95% on room air today.   Still continues to smoke-=extensively counseled to quit smoking-Previously did order nicotine patch but patient advised to work on quitting smoking.  Not ready to quit smoking yet.   On maintenance prednisone.  Has his own portable concentrator and pulse oximeter.  Mostly using oxygen at nighttime and daytime as needed   Follows up with [facility] cardiology.      Uses Lasix as needed.  Previously  saw cardiologist also for infrarenal aneurysm, Dr. Monaco-patient following up with Dr Enamorado now   He uses his Combivent as needed and he uses Advair.  He  uses duo nebs every 6 hours at home scheduled.   He has benefited from prednisone in the past.  Started on maintenance prednisone 10 mg last year.  He feels benefit on the same.    Patient is currently not vaccinated against Covid 19.  On 1/10/2022 his rapid COVID came back positive. He was given a course of doxycycline and prednisone.  chest x-ray was done which showed-2/1/2022-bilateral infiltrates with mild bilateral pleural effusions.  With his ongoing symptoms, he was directed to get admitted to the ER.  In the hospital, he was seen by Dr. Kamara.    He was admitted to [facility] from 2/1/2022-2/3/2022.  He underwent CT of the chest over there which was negative for PE.  It did show early fibrotic changes.  Small bilateral pleural effusions were noted as well.  He was treated with antibiotics along with steroids in the hospital along with IV Lasix.  Echo was done as well which showed essentially normal EF.  Subsequently he was sent home on short course of Levaquin and prednisone taper.     Later, He was started in pulm rehab.     He does consume a lot of salt at home.  Not very compliant on Lasix in the past. He was started on daily lasix 20 mg in the past.  Now uses it as needed.       Patient was at [facility] ER 6/4/22- was positive for COVID - left AM as he did not believe in diagnosis .  He subsequently went to [facility] - was at [facility] from 6/4/22 to 6/6/2022 - was treated for Rhino/entero virus bronchitis there with fluid overload and later patient left AM       Completed  pulmonary rehab.  [facility] post pulmonary rehab.        Following up with ENT now.  He was using Afrin in the past a lot for nasal congestion.  ENT started him on Flonase nasal spray.  Sinus cultures ordered by ENT-[facility] in the past.  Follows up with ENT for the same.     Sputum culture from 7/14/2023-Sputum culture positive for staph.  This is sensitive to doxycycline that was ordered.  This is not MRSA on the sputum  culture.  He got antibiotics at that time.     He came for follow-up today.  Overall appears to be stable.  Previous  follow-up CT chest noted as well.    Echocardiogram from last month  Summary:   1. Left ventricle: The cavity size is normal. Wall thickness is      normal. Systolic function is normal. The estimated ejection      fraction is 63%. Wall motion is normal; there are no regional      wall motion abnormalities. Grade I diastolic dysfunction with      normal left atrial pressure (According to ASE 2016 guidelines).   2. Right ventricle: The cavity size is normal. Wall thickness is      normal. Systolic function is normal. Systolic pressure is within      the normal range.   3. Mitral valve: The annulus is mildly calcified. There is trivial      regurgitation.   4. Aortic valve: The valve is trileaflet. The leaflets are mildly      calcified. Thickening, consistent with sclerosis. There is no      regurgitation. The maximum velocity is 2.01 m/sec. The mean      gradient is 8.06 mm Hg.   5. Tricuspid valve: There is trivial regurgitation.   6. Systemic veins: The right heart pressure is normal.     An EKG was performed. I agree with computerized EKG interval interpretations. EKG shows sinus rhythm with nonspecific change. There are no acute ST changes to suggest acute ischemia or infarct  Ventricular rate 99 bpm. NY interval 144 ms. QRS duration 92 ms.         MDM      Patient with history of COPD now with increased shortness of breath, possibly COPD exacerbation.  He does have some increased edema in his legs with diastolic dysfunction noted on echocardiogram above and I suspect an element of heart failure as well.  COVID and influenza must be excluded.  No chest pain to suggest acute coronary syndrome.  No painful breathing but patient is tachypneic and tachycardic.  Pulmonary embolism must be excluded    Patient treated with Lasix as well as continuous albuterol and Atrovent neb    CBC white count elevated  14.2 with a predominance of neutrophils on differential.  As noted, patient had taken a dose of steroid medication today.  Metabolic panel shows normal electrolytes and creatinine  Troponin negative  D-dimer negative    COVID testing negative    Chest x-ray  I personally reviewed the actual radiographs themselves and my individual interpretation increased pulmonary vasculature with normal heart size  radiologist's formal interpretation which I have reviewed  FINDINGS:  Heart size is within normal limits.  Pleural spaces appear clear.  Mediastinal and hilar contours are normal.  No focal consolidation.  Interstitial markings are similar to the prior exam, possibly slightly improved.  Mild volume overload or   interstitial lung disease may be possible.   Admission disposition: 8/4/2024  9:10 PM         I discussed case with Dr. Murguia, hospitalist, he will admit                               Medical Decision Making      Disposition and Plan     Clinical Impression:  1. Shortness of breath         Disposition:  Admit  8/4/2024  9:10 pm    Follow-up:  No follow-up provider specified.        Medications Prescribed:  Current Discharge Medication List                            Hospital Problems       Present on Admission  Date Reviewed: 6/24/2024            ICD-10-CM Noted POA    * (Principal) Shortness of breath R06.02 8/4/2024 Unknown

## 2024-08-05 NOTE — H&P
[facility]     Tung Maria Patient Status:  Emergency    7/15/1935 MRN [id number]   Location [facility] EMERGENCY DEPARTMENT Attending Natalio Springer MD   Hosp Day # 0 PCP Shima Tobar MD     Chief Complaint:   Chief Complaint   Patient presents with    Difficulty Breathing       Subjective:    History of Present Illness:     Tung Maria is a 89 year old male with a past medical history of COPD, AAA, BPH, HFpEF.  He uses O2 at night.  Over the past several days he has had increasing shortness of breath.  Denies any URI symptoms.  He has had some mild increase in his lower extremity edema.  He took extra dose of his prednisone this morning though his dyspnea persisted.  He noted decreasing oxygen numbers at home and started to increase his O2.  He comes to the emergency room now secondary to persistent symptoms.    History/Other:    Past Medical History:  Past Medical History:    AAA (abdominal aortic aneurysm) (HCC)    Asthma (HCC)    COPD (chronic obstructive pulmonary disease) (HCC)    Osteoarthritis    Problems with swallowing    Tobacco use    Visual impairment     Past Surgical History:   Past Surgical History:   Procedure Laterality Date    Arthroscopy of joint unlisted Left     Hernia repair        Family History:   History reviewed. No pertinent family history.  Social History:    reports that he has been smoking cigarettes. He has a 60 pack-year smoking history. He has never used smokeless tobacco. He reports that he does not currently use alcohol. He reports that he does not use drugs.     Allergies:   Allergies   Allergen Reactions    Ciprofloxacin OTHER (SEE COMMENTS)     Heel pain    Ibuprofen ARRHYTHMIA    Methocarbamol OTHER (SEE COMMENTS)     Pt states causes headache       Strawberry NAUSEA AND VOMITING    Flexeril [Cyclobenzaprine Hcl]      Pt states causes Headache    Mg Hydroxide-Quinapril Hcl OTHER (SEE COMMENTS)     High blood pressure and  HA    Pcn [Penicillins] RASH       Medications:    No current facility-administered medications on file prior to encounter.     Current Outpatient Medications on File Prior to Encounter   Medication Sig Dispense Refill    fluticasone propionate 50 MCG/ACT Nasal Suspension 2 sprays by Nasal route daily.      albuterol (2.5 MG/3ML) 0.083% Inhalation Nebu Soln Take by nebulization every 6 (six) hours as needed for Wheezing.      latanoprost 0.005 % Ophthalmic Solution Apply 1 drop to eye nightly.      furosemide 20 MG Oral Tab Take 1 tablet (20 mg total) by mouth daily.  0    aspirin 81 MG Oral Tab EC Take 1 tablet (81 mg total) by mouth at bedtime.      doxazosin 1 MG Oral Tab Take 1 tablet (1 mg total) by mouth nightly. For Prostate      tolterodine 2 MG Oral Tab Take 1 tablet (2 mg total) by mouth 2 (two) times daily.      finasteride 5 MG Oral Tab Take 1 tablet (5 mg total) by mouth daily.      donepezil 5 MG Oral Tab Take 1 tablet (5 mg total) by mouth daily.      ADVAIR DISKUS 250-50 MCG/DOSE IN MISC 1 PUFF BID 1 DISKUS 5       Review of Systems:   A comprehensive review of systems was completed.    Pertinent positives and negatives noted in the HPI.    Objective:   Physical Exam:    /89   Pulse 100   Temp 98.9 °F (37.2 °C) (Oral)   Resp 18   Ht 5' 7\" (1.702 m)   Wt 170 lb (77.1 kg)   SpO2 97%   BMI 26.63 kg/m²   General: No acute distress, Alert  Respiratory:dec AE, rhonchi with scattered wheezes  Cardiovascular: S1, S2.   Abdomen: Soft, Non-tender, Non-distended, Positive bowel sounds  Neuro: No new focal deficits  Extremities: trace LE edema      Results:    Labs:      Labs Last 24 Hours:  Recent Labs   Lab 08/04/24 1936   WBC 14.2*   HGB 13.4   MCV 96.0   .0       Recent Labs   Lab 08/04/24 1936   *   BUN 16   CREATSERUM 0.90   CA 9.4   ALB 4.3      K 4.6      CO2 31.0   ALKPHO 81   AST 10   ALT 11   BILT 0.4   TP 7.2       Estimated Creatinine Clearance: 53 mL/min  (based on SCr of 0.9 mg/dL).    Recent Labs   Lab 08/04/24  1936   TROPHS 5       No results for input(s): \"PTP\", \"INR\" in the last 168 hours.    No results for input(s): \"TROP\", \"CK\" in the last 168 hours.      Imaging: Imaging data reviewed in Epic.    Assessment & Plan:      #Dyspnea/Likely COPD exacerbation  Continues to smoke  Cont. Home inhalers  Steroids, nebs  No URI symptoms  CXR reviewed  Azithro x 3    #Acute hypoxic resp failure  Cont. O2 and wean as able  States that prior to this week he used O2 at night only    #Leukocytosis  CXR w/o obvious PNA  Took prednisone today    #AAA  BP control    #HFpEF  BNP not elevated  Cont. Home lasix    #Hyperglycemia  Check A1c  Insulin protocol    #HLD    #BPH  Cont. Home meds  Was for TURP on 8/5; will need to be rescheduled      All diagnosis' and recommendations discussed with patient and/or family in detail.      Plan of care discussed with ED physician      Kg Murguia MD    Supplementary Documentation:     The 21st Century Cures Act makes medical notes like these available to patients in the interest of transparency. Please be advised this is a medical document. Medical documents are intended to carry relevant information, facts as evident, and the clinical opinion of the practitioner. The medical note is intended as peer to peer communication and may appear blunt or direct. It is written in medical language and may contain abbreviations or verbiage that are unfamiliar.

## 2024-08-05 NOTE — ED QUICK NOTES
Orders for admission, patient is aware of plan and ready to go upstairs. Any questions, please call ED RN Nick VELEZ at extension [phone].     Patient Covid vaccination status: Unvaccinated     COVID Test Ordered in ED: SARS-CoV-2/Flu A and B/RSV by PCR (GeneXpert)    COVID Suspicion at Admission: N/A    Running Infusions:  None    Mental Status/LOC at time of transport: a/o x 4    Other pertinent information:   CIWA score: N/A   NIH score:  N/A

## 2024-08-05 NOTE — DISCHARGE SUMMARY
[facility]     Tung Maria Patient Status:  Inpatient    7/15/1935 MRN [id number]   Location [facility] 2NE-A Attending Josemanuel Louis MD   Hosp Day # 0 PCP Shima Tobar MD     Date of Admission: 2024  Date of Discharge:   2024    Discharge Disposition: Left Against Medical Advice    Discharge Diagnosis:    #COPD exacerbation  #Acute on chronic hypoxic resp failure  #Leukocytosis  #AAA  #HFpEF  #Pre DM  #HLD  #BPH    History of Present Illness: Tung Maria is a 89 year old male with a past medical history of COPD, AAA, BPH, HFpEF.  He uses O2 at night.  Over the past several days he has had increasing shortness of breath.  Denies any URI symptoms.  He has had some mild increase in his lower extremity edema.  He took extra dose of his prednisone this morning though his dyspnea persisted.  He noted decreasing oxygen numbers at home and started to increase his O2.  He comes to the emergency room now secondary to persistent symptoms.    Brief Synopsis: Patient presented with hypoxia.  Admitted for copd exacerbation.  Weaned off oxygen.  Will discharge home on azithromycin for 2 more days.  Prednisone 40mg daily for 4 days.  Then will go back to regular home 10mg dosing.  Encouraged f/u with pulmonologist.  I spoke with his urologist, turp cancelled until he gets cleared by pulm service.  Resume home meds.  All questions addressed.  F/u with pcp    Lace+ Score: 66  59-90 High Risk  29-58 Medium Risk  0-28   Low Risk       TCM Follow-Up Recommendation:  LACE > 58: High Risk of readmission after discharge from the hospital.  **Certification    Admission date was 2024.  Inpatient stay was shorter than expected.  Patient's Shortness of breath was initially serious enough to expect a more lengthy hospitalization but patient improved faster than expected.                 Procedures during hospitalization:   None    Lab/Test results pending at Discharge:    None    Consultants:  None    Discharge Medication List:     Discharge Medications        START taking these medications        Instructions Prescription details   azithromycin 250 MG Tabs  Commonly known as: Zithromax  Start taking on: August 6, 2024      Take 2 tablets (500 mg total) by mouth daily for 2 days.   Stop taking on: August 8, 2024  Quantity: 4 tablet  Refills: 0            CHANGE how you take these medications        Instructions Prescription details   predniSONE 10 MG Tabs  Commonly known as: Deltasone  Start taking on: August 6, 2024  What changed: You were already taking a medication with the same name, and this prescription was added. Make sure you understand how and when to take each.      Take 4 tablets (40 mg total) by mouth daily for 4 days. 50mg x 2 days, then 40mg x 3 days, then 30mg x 3 days then 20mg x 3 days then 10mg x 3 days then stop   Stop taking on: August 10, 2024  Quantity: 16 tablet  Refills: 0     predniSONE 10 MG Tabs  Commonly known as: Deltasone  Start taking on: August 10, 2024  What changed: These instructions start on August 10, 2024. If you are unsure what to do until then, ask your doctor or other care provider.      Take 1 tablet (10 mg total) by mouth daily.   Refills: 0            CONTINUE taking these medications        Instructions Prescription details   Advair Diskus 250-50 MCG/DOSE Aepb  Generic drug: fluticasone-salmeterol      1 PUFF BID   Quantity: 1 DISKUS  Refills: 5     albuterol (2.5 MG/3ML) 0.083% Nebu  Commonly known as: Ventolin      Take by nebulization every 6 (six) hours as needed for Wheezing.   Refills: 0     aspirin 81 MG Tbec      Take 1 tablet (81 mg total) by mouth at bedtime.   Refills: 0     doxazosin 1 MG Tabs  Commonly known as: Cardura      Take 1 tablet (1 mg total) by mouth nightly. For Prostate   Refills: 0     finasteride 5 MG Tabs  Commonly known as: Proscar      Take 1 tablet (5 mg total) by mouth nightly.   Refills: 0     fluticasone  propionate 50 MCG/ACT Susp  Commonly known as: Flonase      2 sprays by Nasal route daily.   Refills: 0     furosemide 20 MG Tabs  Commonly known as: Lasix      Take 1 tablet (20 mg total) by mouth daily.   Refills: 0     latanoprost 0.005 % Soln  Commonly known as: Xalatan      Apply 1 drop to eye nightly.   Refills: 0     tolterodine 2 MG Tabs  Commonly known as: Detrol      Take 1 tablet (2 mg total) by mouth 2 (two) times daily.   Refills: 0               Where to Get Your Medications        These medications were sent to [id number] - [place], [place] - [address] AT [facility] [address], [phone], [phone]  [address] [facility] [facility] [address]      Phone: [phone]   azithromycin 250 MG Tabs       Please  your prescriptions at the location directed by your doctor or nurse    Bring a paper prescription for each of these medications  predniSONE 10 MG Tabs         [place]    Follow-up appointment:   No follow-up provider specified.  Appointments for Next 30 Days 2024 - 2024        Date Arrival Time Visit Type Length Department Provider     9/3/2024 10:30 AM  POST OP VISIT [2853] 15 min [facility] Alok Sims MD    Patient Instructions:         Location Instructions:     Masks are optional for all patients and visitors, unless otherwise indicated.                      Vital signs:  Temp:  [98.3 °F (36.8 °C)-98.9 °F (37.2 °C)] 98.8 °F (37.1 °C)  Pulse:  [] 96  Resp:  [18-25] 20  BP: (116-155)/(49-89) 128/49  SpO2:  [90 %-99 %] 94 %    Physical Exam:    General: No acute distress, pleasant    Lungs: diminished BS  Cardiovascular: S1, S2, RRR  Abdomen: Soft, NT, ND    -----------------------------------------------------------------------------------------------  PATIENT DISCHARGE INSTRUCTIONS: See electronic chart    Josemanuel Louis MD    Total time spent on discharge plannin minutes     The 21st  Century Cures Act makes medical notes like these available to patients in the interest of transparency. Please be advised this is a medical document. Medical documents are intended to carry relevant information, facts as evident, and the clinical opinion of the practitioner. The medical note is intended as peer to peer communication and may appear blunt or direct. It is written in medical language and may contain abbreviations or verbiage that are unfamiliar.

## 2024-08-05 NOTE — ED INITIAL ASSESSMENT (HPI)
Pt normally on 3L O2 via NC at home but only wears O2 at night, pt reports his O2 was low today, pt scheduled to have prostate sx tomorrow AM and cannot have sx done unless O2 is WNL; pt initially having AVINASH but now feels better with O2

## 2024-08-05 NOTE — PROGRESS NOTES
Pt cleared for dc.  IV and tele removed.  Given dc instructions, verbalized all understanding. Pt and wife accompanied to [facility] with all belongings.

## 2024-08-05 NOTE — TELEPHONE ENCOUNTER
Admitted last night with COPD exacerbation. Will cancel scheduled TURP for today and reschedule. Requested cardiac and pulmonary clearance prior to discharge.    Alok Sims MD  Staff Urologist  [facility]  Office: [phone]

## 2024-08-05 NOTE — CM/SW NOTE
Pt discussed in rounds and chart was reviewed. No anticipated DC needs identified at this time. Will monitor for any changes in needs.      to remain available for support and/or discharge planning.    LIAN Love  Discharge Planner  [phone]

## 2024-08-05 NOTE — PLAN OF CARE
Admitted this 88 y/o male , alert and o x 4 , [place]. On O2 at 3 li/min via nc , denies SOB. Tele shows NSR on the monitor. Database completed ; spouse at bedside. Skin assessment checked with PCT Helen.       Cont. Monitor per tele/labs/v/s. Meds as ordered. Fall/safety precautions instructed , placed call light w/in reach. Pt. refused bed alarm.

## 2024-08-05 NOTE — PLAN OF CARE
Assumed care of patient at 0730.  Alert and oriented x4.  Weaned to 2L O2 with adequate o2 saturations.   NSR on tele. No complaints of chest pain or shortness of breath. Refused his PO lasix, takes PRN.   Continent of bowel and bladder.  Up ad carey, pt refusing bed alarm. Steady.  Pt and wife updated on plan of care, verbalized understanding.      MD updated pt not requiring O2 and has been room air.     Problem: Diabetes/Glucose Control  Goal: Glucose maintained within prescribed range  Description: INTERVENTIONS:  - Monitor Blood Glucose as ordered  - Assess for signs and symptoms of hyperglycemia and hypoglycemia  - Administer ordered medications to maintain glucose within target range  - Assess barriers to adequate nutritional intake and initiate nutrition consult as needed  - Instruct patient on self management of diabetes  Outcome: Progressing

## 2024-08-05 NOTE — PROGRESS NOTES
[facility]   part of [facility]     Hospitalist Progress Note     Tung Maria Patient Status:  Inpatient    7/15/1935 MRN [id number]   Location [facility] 2NE-A Attending Josemanuel Louis MD   Hosp Day # 0 PCP Shima Tobar MD     Chief Complaint: Hypoxia     Subjective:     Patient is feeling well today.  Denies sob, no cough.  Eating well, no n/v, no diarrhea.  No other acute complaints.     Objective:    Review of Systems:   A comprehensive review of systems was completed; pertinent positive and negatives stated in subjective.    Vital signs:  Temp:  [98.4 °F (36.9 °C)-98.9 °F (37.2 °C)] 98.8 °F (37.1 °C)  Pulse:  [] 90  Resp:  [18-25] 20  BP: (116-155)/(50-89) 125/66  SpO2:  [90 %-99 %] 90 %    Physical Exam:    General: No acute distress, pleasant   Respiratory: diminished BS with diffuse wheeze   Cardiovascular: S1, S2, regular rate and rhythm  Abdomen: Soft, Non-tender, non-distended, positive bowel sounds  Neuro: No new focal deficits.   Extremities: No edema    Diagnostic Data:    Labs:  Recent Labs   Lab 24   WBC 14.2*   HGB 13.4   MCV 96.0   .0       Recent Labs   Lab 24   *   BUN 16   CREATSERUM 0.90   CA 9.4   ALB 4.3      K 4.6      CO2 31.0   ALKPHO 81   AST 10   ALT 11   BILT 0.4   TP 7.2       Estimated Creatinine Clearance: 53 mL/min (based on SCr of 0.9 mg/dL).    Recent Labs   Lab 24   TROPHS 5       No results for input(s): \"PTP\", \"INR\" in the last 168 hours.               Microbiology    No results found for this visit on 24.      Imaging: Reviewed in Epic.    Medications:    fluticasone-salmeterol  1 puff Inhalation BID    aspirin  81 mg Oral Nightly    finasteride  5 mg Oral Daily    furosemide  20 mg Oral Daily    latanoprost  1 drop Ophthalmic Nightly    donepezil  5 mg Oral Daily    fluticasone propionate  2 spray Nasal Daily    terazosin  1 mg Oral Nightly    oxybutynin  5 mg Oral BID     enoxaparin  40 mg Subcutaneous Daily    insulin aspart  1-10 Units Subcutaneous TID AC and HS    ipratropium-albuterol  3 mL Nebulization Q4H WA (5 times daily)    methylPREDNISolone  40 mg Intravenous Q8H    azithromycin  500 mg Oral Daily       Assessment & Plan:      #Dyspnea/Likely COPD exacerbation  Continues to smoke  Cont. Home inhalers  Steroids, nebs  No URI symptoms  CXR reviewed  Azithro x 3     #Acute hypoxic resp failure  Cont. O2 and wean as able  States that prior to this week he used O2 at night only  Still on 4L, will titrate down as able      #Leukocytosis  CXR w/o obvious PNA  Took prednisone today     #AAA  BP control     #HFpEF  BNP not elevated  Cont. Home lasix     #Pre DM  Check A1c - 6.0  Insulin protocol     #HLD     #BPH  Cont. Home meds  Was for TURP on 8/5; will need to be rescheduled      Josemanuel Louis MD    Supplementary Documentation:     Quality:  DVT Mechanical Prophylaxis:     Early ambuation  DVT Pharmacologic Prophylaxis   Medication    enoxaparin (Lovenox) 40 MG/0.4ML SUBQ injection 40 mg         DVT Pharmacologic prophylaxis: Aspirin 81 mg      Code Status: Not on file  Fenton: No urinary catheter in place  Fenton Duration (in days):   Central line:    SUNG:     Discharge is dependent on: after O2 is weakned   At this point Mr. Maria is expected to be discharge to: home    The 21st Century Cures Act makes medical notes like these available to patients in the interest of transparency. Please be advised this is a medical document. Medical documents are intended to carry relevant information, facts as evident, and the clinical opinion of the practitioner. The medical note is intended as peer to peer communication and may appear blunt or direct. It is written in medical language and may contain abbreviations or verbiage that are unfamiliar.             **Certification      PHYSICIAN Certification of Need for Inpatient Hospitalization - Initial Certification    Patient will require  inpatient services that will reasonably be expected to span two midnight's based on the clinical documentation in H+P.   Based on patients current state of illness, I anticipate that, after discharge, patient will require TBD.

## 2024-08-14 ENCOUNTER — WALK IN (OUTPATIENT)
Dept: URGENT CARE | Age: 89
End: 2024-08-14

## 2024-08-14 VITALS
HEART RATE: 88 BPM | DIASTOLIC BLOOD PRESSURE: 86 MMHG | TEMPERATURE: 97.8 F | OXYGEN SATURATION: 94 % | RESPIRATION RATE: 18 BRPM | SYSTOLIC BLOOD PRESSURE: 146 MMHG

## 2024-08-14 DIAGNOSIS — Z91.09 ENVIRONMENTAL ALLERGIES: ICD-10-CM

## 2024-08-14 DIAGNOSIS — J40 BRONCHITIS: Primary | ICD-10-CM

## 2024-08-14 DIAGNOSIS — R06.02 SOB (SHORTNESS OF BREATH): ICD-10-CM

## 2024-08-14 RX ORDER — AZITHROMYCIN 250 MG/1
TABLET, FILM COATED ORAL
Qty: 10 TABLET | Refills: 0 | Status: SHIPPED | OUTPATIENT
Start: 2024-08-14

## 2024-08-27 ENCOUNTER — APPOINTMENT (OUTPATIENT)
Dept: OPHTHALMOLOGY | Age: 89
End: 2024-08-27

## 2024-08-27 ENCOUNTER — APPOINTMENT (OUTPATIENT)
Dept: OPTOMETRY | Age: 89
End: 2024-08-27

## 2024-08-28 ENCOUNTER — APPOINTMENT (OUTPATIENT)
Dept: OPHTHALMOLOGY | Age: 89
End: 2024-08-28

## 2024-08-28 DIAGNOSIS — J44.9 CHRONIC OBSTRUCTIVE PULMONARY DISEASE, UNSPECIFIED COPD TYPE  (CMD): ICD-10-CM

## 2024-08-29 RX ORDER — PREDNISONE 10 MG/1
10 TABLET ORAL DAILY
Qty: 90 TABLET | Refills: 1 | Status: SHIPPED | OUTPATIENT
Start: 2024-08-29

## 2024-09-10 ENCOUNTER — TELEPHONE (OUTPATIENT)
Dept: SURGERY | Facility: CLINIC | Age: 89
End: 2024-09-10

## 2024-09-10 ENCOUNTER — TELEPHONE (OUTPATIENT)
Dept: PULMONOLOGY | Age: 89
End: 2024-09-10

## 2024-09-10 RX ORDER — FLUTICASONE PROPIONATE AND SALMETEROL 250; 50 UG/1; UG/1
1 POWDER RESPIRATORY (INHALATION) 2 TIMES DAILY
Qty: 60 EACH | Refills: 11 | Status: SHIPPED | OUTPATIENT
Start: 2024-09-10 | End: 2024-09-10 | Stop reason: SDUPTHER

## 2024-09-10 RX ORDER — IPRATROPIUM BROMIDE AND ALBUTEROL 20; 100 UG/1; UG/1
1 SPRAY, METERED RESPIRATORY (INHALATION) EVERY 4 HOURS PRN
Qty: 4 G | Refills: 11 | Status: SHIPPED | OUTPATIENT
Start: 2024-09-10 | End: 2024-09-10 | Stop reason: SDUPTHER

## 2024-09-10 RX ORDER — ALBUTEROL SULFATE 90 UG/1
2 AEROSOL, METERED RESPIRATORY (INHALATION) EVERY 4 HOURS PRN
Qty: 1 EACH | Refills: 5 | Status: SHIPPED | OUTPATIENT
Start: 2024-09-10 | End: 2024-09-10 | Stop reason: SDUPTHER

## 2024-09-10 RX ORDER — FLUTICASONE PROPIONATE AND SALMETEROL 250; 50 UG/1; UG/1
1 POWDER RESPIRATORY (INHALATION) 2 TIMES DAILY
Qty: 60 EACH | Refills: 11 | Status: SHIPPED | OUTPATIENT
Start: 2024-09-10

## 2024-09-10 RX ORDER — IPRATROPIUM BROMIDE AND ALBUTEROL 20; 100 UG/1; UG/1
1 SPRAY, METERED RESPIRATORY (INHALATION) EVERY 4 HOURS PRN
Qty: 4 G | Refills: 11 | Status: SHIPPED | OUTPATIENT
Start: 2024-09-10

## 2024-09-10 RX ORDER — ALBUTEROL SULFATE 90 UG/1
2 AEROSOL, METERED RESPIRATORY (INHALATION) EVERY 4 HOURS PRN
Qty: 1 EACH | Refills: 5 | Status: SHIPPED | OUTPATIENT
Start: 2024-09-10

## 2024-09-10 NOTE — TELEPHONE ENCOUNTER
RN received cardiac clearance from Josh Tang  \"Hold Aspirin 7 days prior. Restart ASAP.\"  Copy forwarded to KYLIE and Dr Sims to review.

## 2024-09-13 ENCOUNTER — TELEPHONE (OUTPATIENT)
Dept: SURGERY | Facility: CLINIC | Age: 89
End: 2024-09-13

## 2024-09-13 NOTE — TELEPHONE ENCOUNTER
Patient asking if he needs to get cardiac clearance and any other clearance again for 10/7 surgery. Please call.

## 2024-09-16 ENCOUNTER — TELEPHONE (OUTPATIENT)
Dept: PULMONOLOGY | Age: 89
End: 2024-09-16

## 2024-09-16 NOTE — TELEPHONE ENCOUNTER
This encounter is now closed.     RN called patient.       8/5/24  8:53 AM  Note      Admitted last night with COPD exacerbation. Will cancel scheduled TURP for today and reschedule. Requested cardiac and pulmonary clearance prior to discharge.        Informed patient that per Dr Sims to have cardiac and pulmonology clearance prior  Cardiology already sent clearance to hold Aspirin 7 days prior to surgery  Need pulmonary clearance still  Patient agreed to plans.

## 2024-09-16 NOTE — TELEPHONE ENCOUNTER
Alok Sims MD         9/10/24 12:04 PM  Note      Cleared for surgery and to hold aspirin 7 days prior to surgery by Dr. Enamorado.

## 2024-09-17 ENCOUNTER — APPOINTMENT (OUTPATIENT)
Dept: OPHTHALMOLOGY | Age: 89
End: 2024-09-17

## 2024-09-17 ENCOUNTER — APPOINTMENT (OUTPATIENT)
Dept: OPTOMETRY | Age: 89
End: 2024-09-17

## 2024-09-17 DIAGNOSIS — H35.3211 WET AGE-RELATED MACULAR DEGENERATION OF RIGHT EYE WITH ACTIVE CHOROIDAL NEOVASCULARIZATION  (CMD): Primary | ICD-10-CM

## 2024-09-17 DIAGNOSIS — H35.3122 INTERMEDIATE STAGE NONEXUDATIVE AGE-RELATED MACULAR DEGENERATION OF LEFT EYE: ICD-10-CM

## 2024-09-17 DIAGNOSIS — Z96.1 PSEUDOPHAKIA OF BOTH EYES: ICD-10-CM

## 2024-09-17 DIAGNOSIS — H40.1133 PRIMARY OPEN ANGLE GLAUCOMA OF BOTH EYES, SEVERE STAGE: Primary | ICD-10-CM

## 2024-09-17 DIAGNOSIS — H40.1131 PRIMARY OPEN ANGLE GLAUCOMA (POAG) OF BOTH EYES, MILD STAGE: ICD-10-CM

## 2024-09-17 DIAGNOSIS — H47.011 AION (ANTERIOR ISCHEMIC OPTIC NEUROPATHY), RIGHT EYE: ICD-10-CM

## 2024-09-17 ASSESSMENT — REFRACTION_WEARINGRX
OS_ADD: +2.50
OD_AXIS: 180
OD_ADD: +2.50
OD_SPHERE: +1.75
OD_CYLINDER: +1.00
OS_AXIS: 180
OS_SPHERE: +2.00
OS_CYLINDER: +1.00

## 2024-09-17 ASSESSMENT — VISUAL ACUITY
OS_CC: J3
CORRECTION_TYPE: GLASSES
METHOD: SNELLEN - LINEAR
OS_CC: 20/50
OD_CC: 20/200
OS_CC+: +2

## 2024-09-17 ASSESSMENT — CONF VISUAL FIELD
OS_SUPERIOR_NASAL_RESTRICTION: 0
OD_SUPERIOR_TEMPORAL_RESTRICTION: 0
OD_SUPERIOR_NASAL_RESTRICTION: 3
OS_SUPERIOR_TEMPORAL_RESTRICTION: 0
OD_INFERIOR_TEMPORAL_RESTRICTION: 0
OS_INFERIOR_TEMPORAL_RESTRICTION: 0
OD_INFERIOR_NASAL_RESTRICTION: 1
OS_NORMAL: 1
OS_INFERIOR_NASAL_RESTRICTION: 0

## 2024-09-17 ASSESSMENT — EXTERNAL EXAM - LEFT EYE: OS_EXAM: NORMAL

## 2024-09-17 ASSESSMENT — TONOMETRY
OD_IOP_MMHG: 14
OS_IOP_MMHG: 18
IOP_METHOD: APPLANATION

## 2024-09-17 ASSESSMENT — SLIT LAMP EXAM - LIDS
COMMENTS: NORMAL
COMMENTS: NORMAL

## 2024-09-17 ASSESSMENT — EXTERNAL EXAM - RIGHT EYE: OD_EXAM: NORMAL

## 2024-09-17 ASSESSMENT — PACHYMETRY
OS_CT(UM): 566
OD_CT(UM): 548

## 2024-09-18 DIAGNOSIS — H35.3211 WET AGE-RELATED MACULAR DEGENERATION OF RIGHT EYE WITH ACTIVE CHOROIDAL NEOVASCULARIZATION  (CMD): Primary | ICD-10-CM

## 2024-09-20 RX ORDER — ALBUTEROL SULFATE 90 UG/1
1 INHALANT RESPIRATORY (INHALATION) EVERY 6 HOURS PRN
COMMUNITY

## 2024-09-20 RX ORDER — HYDROCODONE BITARTRATE AND ACETAMINOPHEN 5; 325 MG/1; MG/1
1 TABLET ORAL EVERY 6 HOURS PRN
COMMUNITY

## 2024-09-20 RX ORDER — DONEPEZIL HYDROCHLORIDE 10 MG/1
10 TABLET, FILM COATED ORAL NIGHTLY
COMMUNITY

## 2024-09-24 ENCOUNTER — TELEPHONE (OUTPATIENT)
Dept: SURGERY | Facility: CLINIC | Age: 89
End: 2024-09-24

## 2024-09-24 DIAGNOSIS — N40.1 BENIGN PROSTATIC HYPERPLASIA WITH LOWER URINARY TRACT SYMPTOMS, SYMPTOM DETAILS UNSPECIFIED: Primary | ICD-10-CM

## 2024-09-24 NOTE — TELEPHONE ENCOUNTER
Per patient states his PCP is out of the office for a month, will be seeing pulmonary doctor for clearance next week, asking if blood and urine labs requested prior to surgery can be ordered by pulmonary doctor? Please call thank you.

## 2024-09-24 NOTE — TELEPHONE ENCOUNTER
Patient is scheduled for Surgery on 10/07/24 with Dr. Sims. Will reach out to PAT or possibly reschedule.  Contacted patient to discuss.

## 2024-09-25 ENCOUNTER — TELEPHONE (OUTPATIENT)
Dept: SURGERY | Facility: CLINIC | Age: 89
End: 2024-09-25

## 2024-09-25 DIAGNOSIS — J44.9 CHRONIC OBSTRUCTIVE PULMONARY DISEASE, UNSPECIFIED COPD TYPE  (CMD): ICD-10-CM

## 2024-09-25 NOTE — TELEPHONE ENCOUNTER
RN called patient. He reports slow stream, but after taking AZO, the stream went well. Denies bladder pain. Last BM on Monday, 9/23.  Pains at left sided below waist and travels below breast. Asking if this is related to bladder. RN asked, if he has acid reflux. Patient said that the discomfort went away after a walk. It might have been gas    Encouraged to monitor fluid intake and output  Sit or stand up whichever makes him comfortable to empty his bladder  He prefers standing up  Continue to monitor symptoms  Call office if needed for a nurse visit for PVR.   Patient agreed to plans.

## 2024-09-25 NOTE — TELEPHONE ENCOUNTER
Spoke with the patient, he stated that he has a Pulmonologist appt on Friday, 9/27, but didn't go to the PCP appt that was offered yesterday because he had an eye appt.  Stated that I would contact his PCP office and discuss with them.  Contact Dr. DAMON Tobar's office at [facility]  spoke with Clara who stated that they have offered Several times to get the patient in for an appointment for a pre-op, even stating that without seeing them he would not have the surgery.  They have not been able to the see the patient and will be faxing over documentation.  Per Dr. Sims's instructions patient's needs PCP, Cardiology and Pulmonary clearance prior to the surgery.  Will Cancel the surgery pending Clearances to reschedule.

## 2024-09-25 NOTE — TELEPHONE ENCOUNTER
Spoke with the patient, surgery was scheduled for 10/07/24, but rescheduled for 10/30/24 as the patient needs PCP, Cardiology and Pulmonary clearance with 2 pending.  Patient is stating that he is having a difficult time urinating and is wondering if there is something that can be done.  Please contact the patient to discuss.

## 2024-09-25 NOTE — TELEPHONE ENCOUNTER
Contacted the patient and spoke with his wife Abena, advised that I spoke with Clara at [facility] and that as of now, we were going to postpone the surgery until we are able to get the clearances.  She is going to call to set up an appointment with the PCP and call me back.  Tentatively moving the surgery to 10/30/24.  Advised wife we have Clearance from Cardiologist, they have appt with Pulmonologist on 09/27/24 and making the appt with PCP, Dr. Tobar today for asap.

## 2024-09-26 ENCOUNTER — EXTERNAL LAB (OUTPATIENT)
Dept: HEALTH INFORMATION MANAGEMENT | Facility: OTHER | Age: 89
End: 2024-09-26

## 2024-09-26 LAB
ALBUMIN SERPL-MCNC: 3.8 G/DL (ref 3.6–5.1)
ALBUMIN/GLOB SERPL: 1.5 (CALC) (ref 1–2.5)
ALP SERPL-CCNC: 65 U/L (ref 35–144)
ALT SERPL-CCNC: 10 U/L (ref 9–46)
APPEARANCE UR: CLEAR
APTT PPP: 28 SEC (ref 23–32)
AST SERPL-CCNC: 13 U/L (ref 10–35)
BACTERIA #/AREA URNS HPF: ABNORMAL /HPF
BACTERIA UR CULT: ABNORMAL
BASOPHILS # BLD: 30 CELLS/UL (ref 0–200)
BASOPHILS NFR BLD: 0.3 %
BILIRUB SERPL-MCNC: 0.5 MG/DL (ref 0.2–1.2)
BILIRUB UR QL: NEGATIVE
BUN SERPL-MCNC: 14 MG/DL (ref 7–25)
BUN/CREAT SERPL: ABNORMAL (CALC) (ref 6–22)
CALCIUM SERPL-MCNC: 8.8 MG/DL (ref 8.6–10.3)
CHLORIDE SERPL-SCNC: 102 MMOL/L (ref 98–110)
CO2 SERPL-SCNC: 26 MMOL/L (ref 20–32)
COLOR UR: ABNORMAL
CREAT SERPL-MCNC: 0.92 MG/DL (ref 0.7–1.22)
EOSINOPHIL # BLD: 50 CELLS/UL (ref 15–500)
EOSINOPHIL NFR BLD: 0.5 %
ERYTHROCYTE [DISTWIDTH] IN BLOOD: 11.8 % (ref 11–15)
GFR SERPLBLD SCHWARTZ-ARVRAT: 80 ML/MIN/1.73M2
GLOBULIN SER-MCNC: 2.5 G/DL (CALC) (ref 1.9–3.7)
GLUCOSE SERPL-MCNC: 112 MG/DL (ref 65–99)
GLUCOSE UR-MCNC: NEGATIVE MG/DL
HCT VFR BLD CALC: 38 % (ref 38.5–50)
HGB BLD-MCNC: 12.5 G/DL (ref 13.2–17.1)
HGB UR QL: ABNORMAL
HYALINE CASTS #/AREA URNS LPF: ABNORMAL LPF
INR PPP: 1
KETONES UR-MCNC: NEGATIVE MG/DL
LAB RESULT: NORMAL
LENGTH OF FAST TIME PATIENT: ABNORMAL H
LEUKOCYTE ESTERASE UR QL STRIP: ABNORMAL
LYMPHOCYTES # BLD: 870 CELLS/UL (ref 850–3900)
LYMPHOCYTES NFR BLD: 8.7 %
MCH RBC QN AUTO: 31.3 PG (ref 27–33)
MCHC RBC AUTO-ENTMCNC: 32.9 G/DL (ref 32–36)
MCV RBC AUTO: 95.2 FL (ref 80–100)
MONOCYTES # BLD: 290 CELLS/UL (ref 200–950)
MONOCYTES NFR BLD: 2.9 %
NEUTROPHILS # BLD: 8760 CELLS/UL (ref 1500–7800)
NEUTROPHILS NFR BLD: 87.6 %
NITRITE UR QL: POSITIVE
PH UR: 7 [PH] (ref 5–8)
PLATELET # BLD: 249 THOUSAND/UL (ref 140–400)
PMV BLD AUTO: 11.9 FL (ref 7.5–12.5)
POTASSIUM SERPL-SCNC: 4.7 MMOL/L (ref 3.5–5.3)
PROT SERPL-MCNC: 6.3 G/DL (ref 6.1–8.1)
PROT UR QL: NEGATIVE
PROTHROMBIN TIME: 11.3 SEC (ref 9–11.5)
RBC # BLD: 3.99 MILLION/UL (ref 4.2–5.8)
RBC #/AREA URNS HPF: ABNORMAL /HPF
SODIUM SERPL-SCNC: 139 MMOL/L (ref 135–146)
SP GR UR: 1.01 (ref 1–1.03)
SPECIMEN SOURCE: ABNORMAL
SQUAMOUS #/AREA URNS HPF: ABNORMAL /HPF
WBC # BLD: 10 THOUSAND/UL (ref 3.8–10.8)
WBC #/AREA URNS HPF: ABNORMAL /HPF

## 2024-09-27 ENCOUNTER — OFFICE VISIT (OUTPATIENT)
Dept: PULMONOLOGY | Age: 89
End: 2024-09-27

## 2024-09-27 ENCOUNTER — TELEPHONE (OUTPATIENT)
Dept: PULMONOLOGY | Age: 89
End: 2024-09-27

## 2024-09-27 VITALS
SYSTOLIC BLOOD PRESSURE: 102 MMHG | BODY MASS INDEX: 27.13 KG/M2 | TEMPERATURE: 98 F | DIASTOLIC BLOOD PRESSURE: 56 MMHG | OXYGEN SATURATION: 97 % | WEIGHT: 172.84 LBS | RESPIRATION RATE: 18 BRPM | HEIGHT: 67 IN | HEART RATE: 79 BPM

## 2024-09-27 DIAGNOSIS — R60.0 BILATERAL LEG EDEMA: ICD-10-CM

## 2024-09-27 DIAGNOSIS — M79.89 BILATERAL SWELLING OF FEET: ICD-10-CM

## 2024-09-27 DIAGNOSIS — J43.8 OTHER EMPHYSEMA  (CMD): Primary | ICD-10-CM

## 2024-09-27 RX ORDER — PREDNISONE 10 MG/1
TABLET ORAL
Qty: 30 TABLET | Refills: 0 | Status: SHIPPED | OUTPATIENT
Start: 2024-09-27

## 2024-09-28 ENCOUNTER — HOSPITAL ENCOUNTER (OUTPATIENT)
Dept: ULTRASOUND IMAGING | Facility: HOSPITAL | Age: 89
Discharge: HOME | End: 2024-09-28
Attending: INTERNAL MEDICINE
Payer: MEDICARE

## 2024-09-28 DIAGNOSIS — R60.0 LOCALIZED EDEMA: ICD-10-CM

## 2024-09-28 DIAGNOSIS — J43.8 OTHER EMPHYSEMA (HCC): ICD-10-CM

## 2024-09-28 PROCEDURE — 93970 EXTREMITY STUDY: CPT | Performed by: INTERNAL MEDICINE

## 2024-09-30 ENCOUNTER — TELEPHONE (OUTPATIENT)
Dept: PULMONOLOGY | Age: 89
End: 2024-09-30

## 2024-09-30 ENCOUNTER — TELEPHONE (OUTPATIENT)
Dept: SURGERY | Facility: CLINIC | Age: 89
End: 2024-09-30

## 2024-09-30 RX ORDER — FUROSEMIDE 20 MG
20 TABLET ORAL DAILY
Qty: 60 TABLET | Refills: 1 | Status: SHIPPED | OUTPATIENT
Start: 2024-09-30 | End: 2025-01-28

## 2024-09-30 NOTE — TELEPHONE ENCOUNTER
Received a call from the patient's wife, Abena, stated that Tung has seen the doctors and was sent for an Ultrasound.  Advised that I need all the Clearances and the lab results from PCP faxed back.  Patient is willing to move up in surgery date.  Patient can contact me at [phone].

## 2024-10-01 ENCOUNTER — TELEPHONE (OUTPATIENT)
Dept: SURGERY | Facility: CLINIC | Age: 89
End: 2024-10-01

## 2024-10-01 ENCOUNTER — E-ADVICE (OUTPATIENT)
Dept: PULMONOLOGY | Age: 89
End: 2024-10-01

## 2024-10-01 RX ORDER — AZITHROMYCIN 250 MG/1
TABLET, FILM COATED ORAL
Qty: 6 TABLET | Refills: 0 | Status: SHIPPED | OUTPATIENT
Start: 2024-10-01 | End: 2024-10-05

## 2024-10-01 NOTE — TELEPHONE ENCOUNTER
Per Garland asking to speak to a nurse regarding patient's surgery being reschedule. Please advise

## 2024-10-02 ENCOUNTER — TELEPHONE (OUTPATIENT)
Dept: SURGERY | Facility: CLINIC | Age: 89
End: 2024-10-02

## 2024-10-02 NOTE — TELEPHONE ENCOUNTER
This encounter is now closed.     RN called Julian, PCP office. Not available.   Patient's TURP moved to 10/30  PCP updated.

## 2024-10-02 NOTE — TELEPHONE ENCOUNTER
Pharmacy calling to state they received script for ciprofloxacin but states patient has quinolones allergy. Please call.

## 2024-10-02 NOTE — TELEPHONE ENCOUNTER
Thanks, labs reviewed.  Urine culture growing Staph epidermidis.  Likely contaminant but I sent antibiotics to his pharmacy to start 3 days prior to surgery.  Can you contact the patient and let him know to start these antibiotics 3 days prior to surgery?    Thanks,  MB

## 2024-10-02 NOTE — TELEPHONE ENCOUNTER
Julian, pcp office calling for clarification if surgery date is on 10/7 or 10/30. Patients medical records were sent and needs clarification. Please call at [phone],thanks.

## 2024-10-03 ENCOUNTER — TELEPHONE (OUTPATIENT)
Dept: SURGERY | Facility: CLINIC | Age: 89
End: 2024-10-03

## 2024-10-03 NOTE — TELEPHONE ENCOUNTER
Cipro sent for pre op TURP which is currently scheduled 10/30. Await recommendations from MB.    Urine culture ([facility]) Staph Epidermidis  S Levaquin, Cipro, Gent, Macrobid, Tetracycline, and Vanco

## 2024-10-03 NOTE — TELEPHONE ENCOUNTER
Received a call from Venita @ [facility].  Discussed the patient's status and noted in media the progress notes.  Patient's pcp has noted the they give clearance based on Pulmonology clearance.  Contacted Dr. Burnett's office, spoke with Gena who will fax over the notes, stating that pt does have clearance.  Stated they faxed yesterday but will refax per my request.

## 2024-10-04 RX ORDER — NITROFURANTOIN 25; 75 MG/1; MG/1
100 CAPSULE ORAL 2 TIMES DAILY
Qty: 20 CAPSULE | Refills: 0 | Status: SHIPPED | OUTPATIENT
Start: 2024-10-04 | End: 2024-10-14

## 2024-10-04 NOTE — TELEPHONE ENCOUNTER
Called pt to discuss below, no answer, left message to call back.  Pt should start Macrobid 3 days prior to surgery scheduled on 10/30/24.

## 2024-10-09 ENCOUNTER — TELEPHONE (OUTPATIENT)
Dept: SURGERY | Facility: CLINIC | Age: 89
End: 2024-10-09

## 2024-10-09 NOTE — TELEPHONE ENCOUNTER
RN received office notes (9/27/24) from Dr Oseguera Pulmonary at [facility]   Copy forwarded to Dr Sims to review  Patient having TURP on 10/30/24

## 2024-10-11 ENCOUNTER — ANESTHESIA EVENT (OUTPATIENT)
Dept: SURGERY | Facility: HOSPITAL | Age: 89
End: 2024-10-11
Payer: MEDICARE

## 2024-10-15 ENCOUNTER — TELEPHONE (OUTPATIENT)
Dept: SURGERY | Facility: CLINIC | Age: 89
End: 2024-10-15

## 2024-10-15 NOTE — TELEPHONE ENCOUNTER
Patient is scheduled for TURP with me.    Reviewed pulmonary note by Dr. Burnett.  Patient has severe COPD.  He is considered moderate to high risk for upcoming surgery.  Spinal anesthesia recommended.

## 2024-10-18 ENCOUNTER — LABORATORY ENCOUNTER (OUTPATIENT)
Dept: LAB | Facility: HOSPITAL | Age: 89
End: 2024-10-18
Attending: SURGERY
Payer: MEDICARE

## 2024-10-18 DIAGNOSIS — N40.1 BPH W URINARY OBS/LUTS: ICD-10-CM

## 2024-10-18 DIAGNOSIS — Z01.812 PRE-OPERATIVE LABORATORY EXAMINATION: ICD-10-CM

## 2024-10-18 DIAGNOSIS — N40.0 BENIGN PROSTATIC HYPERPLASIA, UNSPECIFIED WHETHER LOWER URINARY TRACT SYMPTOMS PRESENT: ICD-10-CM

## 2024-10-18 DIAGNOSIS — N13.8 BPH W URINARY OBS/LUTS: ICD-10-CM

## 2024-10-18 DIAGNOSIS — R82.3 HEMOGLOBINURIA: ICD-10-CM

## 2024-10-18 DIAGNOSIS — N39.0 RECURRENT UTI: ICD-10-CM

## 2024-10-18 DIAGNOSIS — Z01.818 PRE-OP TESTING: ICD-10-CM

## 2024-10-18 LAB
ANION GAP SERPL CALC-SCNC: 2 MMOL/L (ref 0–18)
BASOPHILS # BLD AUTO: 0.03 X10(3) UL (ref 0–0.2)
BASOPHILS NFR BLD AUTO: 0.3 %
BUN BLD-MCNC: 13 MG/DL (ref 9–23)
CALCIUM BLD-MCNC: 9 MG/DL (ref 8.7–10.4)
CHLORIDE SERPL-SCNC: 103 MMOL/L (ref 98–112)
CO2 SERPL-SCNC: 37 MMOL/L (ref 21–32)
CREAT BLD-MCNC: 0.82 MG/DL
EGFRCR SERPLBLD CKD-EPI 2021: 84 ML/MIN/1.73M2 (ref 60–?)
EOSINOPHIL # BLD AUTO: 0.12 X10(3) UL (ref 0–0.7)
EOSINOPHIL NFR BLD AUTO: 1.1 %
ERYTHROCYTE [DISTWIDTH] IN BLOOD BY AUTOMATED COUNT: 13 %
FASTING STATUS PATIENT QL REPORTED: NO
GLUCOSE BLD-MCNC: 141 MG/DL (ref 70–99)
HCT VFR BLD AUTO: 37.6 %
HGB BLD-MCNC: 11.7 G/DL
IMM GRANULOCYTES # BLD AUTO: 0.07 X10(3) UL (ref 0–1)
IMM GRANULOCYTES NFR BLD: 0.6 %
INR BLD: 1.02 (ref 0.8–1.2)
LYMPHOCYTES # BLD AUTO: 1.64 X10(3) UL (ref 1–4)
LYMPHOCYTES NFR BLD AUTO: 15.1 %
MCH RBC QN AUTO: 30.8 PG (ref 26–34)
MCHC RBC AUTO-ENTMCNC: 31.1 G/DL (ref 31–37)
MCV RBC AUTO: 98.9 FL
MONOCYTES # BLD AUTO: 0.95 X10(3) UL (ref 0.1–1)
MONOCYTES NFR BLD AUTO: 8.7 %
NEUTROPHILS # BLD AUTO: 8.07 X10 (3) UL (ref 1.5–7.7)
NEUTROPHILS # BLD AUTO: 8.07 X10(3) UL (ref 1.5–7.7)
NEUTROPHILS NFR BLD AUTO: 74.2 %
OSMOLALITY SERPL CALC.SUM OF ELEC: 296 MOSM/KG (ref 275–295)
PLATELET # BLD AUTO: 253 10(3)UL (ref 150–450)
POTASSIUM SERPL-SCNC: 3.9 MMOL/L (ref 3.5–5.1)
PROTHROMBIN TIME: 13.5 SECONDS (ref 11.6–14.8)
RBC # BLD AUTO: 3.8 X10(6)UL
SODIUM SERPL-SCNC: 142 MMOL/L (ref 136–145)
WBC # BLD AUTO: 10.9 X10(3) UL (ref 4–11)

## 2024-10-18 PROCEDURE — 87086 URINE CULTURE/COLONY COUNT: CPT

## 2024-10-18 PROCEDURE — 85610 PROTHROMBIN TIME: CPT

## 2024-10-18 PROCEDURE — 80048 BASIC METABOLIC PNL TOTAL CA: CPT

## 2024-10-18 PROCEDURE — 36415 COLL VENOUS BLD VENIPUNCTURE: CPT

## 2024-10-18 PROCEDURE — 87077 CULTURE AEROBIC IDENTIFY: CPT

## 2024-10-18 PROCEDURE — 85025 COMPLETE CBC W/AUTO DIFF WBC: CPT

## 2024-10-22 ENCOUNTER — TELEPHONE (OUTPATIENT)
Dept: PULMONOLOGY | Age: 89
End: 2024-10-22

## 2024-10-22 DIAGNOSIS — R05.9 COUGH, UNSPECIFIED TYPE: Primary | ICD-10-CM

## 2024-10-23 RX ORDER — DOXYCYCLINE HYCLATE 100 MG
100 TABLET ORAL 2 TIMES DAILY
Qty: 14 TABLET | Refills: 0 | Status: SHIPPED | OUTPATIENT
Start: 2024-10-23 | End: 2024-10-30

## 2024-10-23 RX ORDER — PREDNISONE 20 MG/1
TABLET ORAL
Qty: 10 TABLET | Refills: 0 | Status: SHIPPED | OUTPATIENT
Start: 2024-10-23

## 2024-10-28 ENCOUNTER — TELEPHONE (OUTPATIENT)
Dept: SURGERY | Facility: CLINIC | Age: 89
End: 2024-10-28

## 2024-10-28 NOTE — TELEPHONE ENCOUNTER
Patient states he was prescribed antibiotic from Dr. Sims for surgery but he is also taking antibiotic doxycycline for lungs, and patient asking if he can take both at same time or should only take antibiotic from Dr. Sims. See earlier 10/28 closed telephone encounter. Please call.

## 2024-10-28 NOTE — TELEPHONE ENCOUNTER
Patient is currently on an antibiotic from another physician, he also has an antibiotic from Dr. Sims.  Please contact him to discuss as he now has 2 doses from different physicians.  Surgery on 10/30/24

## 2024-10-28 NOTE — TELEPHONE ENCOUNTER
This encounter is now closed.     RN called patient to relay that ok to take both antibiotics per DEJA Levin. Patient agreed to plans.

## 2024-10-29 NOTE — H&P
UROLOGY PRE-OPERATIVE HISTORY & PHYSICAL      Tung Maria Patient Status:  Outpatient in a Bed    7/15/1935 MRN [id number]   [facility] SURGERY Attending Alok Sims MD   Hosp Day # 0 PCP Shima Tobar MD     Primary Care Provider: Shima Tobar MD     Chief Complaint:   BPH     HPI:   Tung Maria is a 89 year old male with history of COPD on home oxygen at night, smoking, AAA referred for BPH/LUTS.  He is currently on doxazosin 1 mg nightly, tolterodine, and finasteride.     He is followed by Dr. Boston ([facility]).  Recently on 6/3/2024 he performed cystoscopy that showed a moderately trabeculated bladder with small diverticuli.  Bladder outlet appeared obstructed by significant lateral lobe hypertrophy.  Transrectal ultrasound measured a 30 cc prostate.  PVR 27 mL.  UroCuff testing showed stuttering low flow with high pressure obstructive voiding pattern.  Maximum flow 9.9 mL/s.  He elected to proceed with UroLift.     He continues to have bothersome weak urinary stream, urgency, frequency despite above medications.  I again discussed options of various surgical treatments.  He has severe COPD and is on home oxygen.  I discussed that a less invasive procedure such as UroLift could help with symptoms.  He is more interested in undergoing TURP in the operating room under general versus spinal.  I discussed that TURP would likely make his obstructive LUTS better but may not improve his overactive symptoms.    Reviewed pulmonary note by Dr. Burnett.  Patient has severe COPD.  He is considered moderate to high risk for upcoming surgery.  Spinal anesthesia recommended.     Urine culture growing Staph epidermidis.  Likely contaminant but I sent antibiotics to his pharmacy to start 3 days prior to surgery.     History:     Past Medical History:    AAA (abdominal aortic aneurysm) (HCC)    Asthma (HCC)    Back problem    BPH (benign prostatic hyperplasia)    COPD (chronic obstructive  pulmonary disease) (HCC)    2L O2 prn    Hearing impaired person, bilateral    not wearing hearing aids    Osteoarthritis    Problems with swallowing    Tobacco use    Visual impairment    glasses       Past Surgical History:   Procedure Laterality Date    Arthroscopy of joint unlisted Left     Hernia repair         History reviewed. No pertinent family history.    Social History     Socioeconomic History    Marital status:    Tobacco Use    Smoking status: Every Day     Current packs/day: 1.00     Average packs/day: 1 pack/day for 60.0 years (60.0 ttl pk-yrs)     Types: Cigarettes    Smokeless tobacco: Never    Tobacco comments:     60 PACK YEARS- 0.5 daily     Pt trying to quit smoking as of 9/20/2024   Vaping Use    Vaping status: Never Used   Substance and Sexual Activity    Alcohol use: Not Currently     Comment: rare    Drug use: No     Social Drivers of Health     Food Insecurity: No Food Insecurity (8/5/2024)    Food Insecurity     Food Insecurity: Never true   Transportation Needs: No Transportation Needs (8/5/2024)    Transportation Needs     Lack of Transportation: No    [facility] from [facility]    Social Connections   Housing Stability: Low Risk  (8/5/2024)    Housing Stability     Housing Instability: No       Medications:  Current Outpatient Medications   Medication Sig Dispense Refill    albuterol 108 (90 Base) MCG/ACT Inhalation Aero Soln Inhale 1 puff into the lungs every 6 (six) hours as needed for Wheezing.      donepezil 10 MG Oral Tab Take 1 tablet (10 mg total) by mouth nightly.      HYDROcodone-acetaminophen 5-325 MG Oral Tab Take 1 tablet by mouth every 6 (six) hours as needed for Pain.      predniSONE 10 MG Oral Tab Take 1 tablet (10 mg total) by mouth daily.      fluticasone propionate 50 MCG/ACT Nasal Suspension 2 sprays by Nasal route daily.      albuterol (2.5 MG/3ML) 0.083% Inhalation Nebu Soln Take by nebulization every 6 (six) hours as needed for Wheezing.       latanoprost 0.005 % Ophthalmic Solution Place 1 drop into both eyes nightly.      furosemide 20 MG Oral Tab Take 1 tablet (20 mg total) by mouth daily.  0    aspirin 81 MG Oral Tab EC Take 1 tablet (81 mg total) by mouth at bedtime.      doxazosin 1 MG Oral Tab Take 1 tablet (1 mg total) by mouth nightly. For Prostate      tolterodine 2 MG Oral Tab Take 1 tablet (2 mg total) by mouth 2 (two) times daily.      finasteride 5 MG Oral Tab Take 1 tablet (5 mg total) by mouth nightly.      ADVAIR DISKUS 250-50 MCG/DOSE IN MISC 1 PUFF BID 1 DISKUS 5       Allergies:  Allergies[1]    Review of Systems:   A comprehensive 10-point review of systems was completed.  Pertinent positives and negatives are noted in the the HPI.    Physical Exam:   Vital Signs:  Height 5' 7\" (1.702 m), weight 165 lb (74.8 kg).     CONSTITUTIONAL: Well developed, well nourished, in no acute distress  NEUROLOGIC: Alert and oriented  HEAD: Normocephalic, atraumatic  EYES: Sclera non-icteric  ENT: Hearing intact, moist mucous membranes  NECK: No obvious goiter or masses  RESPIRATORY: Normal respiratory effort  SKIN: No evident rashes  ABDOMEN: Soft, non-tender, non-distended    Laboratory Data:  Lab Results   Component Value Date    WBC 10.9 10/18/2024    HGB 11.7 (L) 10/18/2024    .0 10/18/2024     Lab Results   Component Value Date     10/18/2024    K 3.9 10/18/2024     10/18/2024    CO2 37.0 (H) 10/18/2024    BUN 13 10/18/2024     (H) 10/18/2024    GFRAA 90 06/06/2022    AST 10 08/04/2024    ALT 11 08/04/2024    TP 7.2 08/04/2024    ALB 4.3 08/04/2024    CA 9.0 10/18/2024       Urinalysis Results (last three years):  Recent Labs     06/04/22 2129 08/05/24  0534   COLORUR Colorless* Light-Yellow   CLARITY Clear Clear   SPECGRAVITY 1.004 1.018   PHURINE 6.0 6.0   PROUR Negative Negative   GLUUR Negative Normal   KETUR Negative Negative   BILUR Negative Negative   BLOODURINE Small* Trace*   NITRITE Negative Negative    UROBILINOGEN <2.0 Normal   LEUUR Negative 75*   WBCUR 1-5 1-5   RBCUR 0-2 0-2   BACUR Rare* Rare*       Urine Culture Results (last three years):  Lab Results   Component Value Date    URINECUL 50,000-99,000 CFU/ML Staphylococcus epidermidis (A) 10/18/2024    URINECUL 50,000-99,000 CFU/ML Staphylococcus epidermidis (A) 10/18/2024       PSA:  No results found for: \"PSA\", \"PERCENTPSA\", \"PSAS\", \"PSAULTRA\"     Imaging (last three days):  No results found.     Assessment:   Tung Maria is a 89 year old male with history of COPD on home oxygen at night, smoking, AAA referred for BPH/LUTS.  He is currently on doxazosin 1 mg nightly, tolterodine, and finasteride.     He is followed by Dr. Boston ([facility]).  Recently on 6/3/2024 he performed cystoscopy that showed a moderately trabeculated bladder with small diverticuli.  Bladder outlet appeared obstructed by significant lateral lobe hypertrophy.  Transrectal ultrasound measured a 30 cc prostate.  PVR 27 mL.  UroCuff testing showed stuttering low flow with high pressure obstructive voiding pattern.  Maximum flow 9.9 mL/s.  He elected to proceed with UroLift.     He continues to have bothersome weak urinary stream, urgency, frequency despite above medications.  I again discussed options of various surgical treatments.  He has severe COPD and is on home oxygen.  I discussed that a less invasive procedure such as UroLift could help with symptoms.  He is more interested in undergoing TURP in the operating room under general versus spinal.  I discussed that TURP would likely make his obstructive LUTS better but may not improve his overactive symptoms.    Reviewed pulmonary note by Dr. Burnett.  Patient has severe COPD.  He is considered moderate to high risk for upcoming surgery.  Spinal anesthesia recommended.     Urine culture growing Staph epidermidis.  Likely contaminant but I sent antibiotics to his pharmacy to start 3 days prior to surgery.    Plan:   - OR for TURP    - Informed consent obtained - risks and benefits explained, and all questions answered    I have personally reviewed all relevant medical records, labs, and imaging.     Alok Sims MD  Staff Urologist  [facility]  Office: [phone]             [1]   Allergies  Allergen Reactions    Ciprofloxacin OTHER (SEE COMMENTS)     Heel pain    Ibuprofen ARRHYTHMIA    Methocarbamol OTHER (SEE COMMENTS)     Pt states causes headache       Flexeril [Cyclobenzaprine Hcl] PAIN     Pt states causes Headache    Strawberry NAUSEA AND VOMITING    Mg Hydroxide-Quinapril Hcl OTHER (SEE COMMENTS)     High blood pressure and HA    Pcn [Penicillins] RASH

## 2024-10-30 ENCOUNTER — ANESTHESIA (OUTPATIENT)
Dept: SURGERY | Facility: HOSPITAL | Age: 89
End: 2024-10-30
Payer: MEDICARE

## 2024-10-30 ENCOUNTER — HOSPITAL ENCOUNTER (OUTPATIENT)
Facility: HOSPITAL | Age: 89
Discharge: HOME | End: 2024-10-31
Attending: SURGERY | Admitting: SURGERY
Payer: MEDICARE

## 2024-10-30 DIAGNOSIS — N40.0 BENIGN PROSTATIC HYPERPLASIA, UNSPECIFIED WHETHER LOWER URINARY TRACT SYMPTOMS PRESENT: Primary | ICD-10-CM

## 2024-10-30 DIAGNOSIS — Z01.818 PRE-OP TESTING: ICD-10-CM

## 2024-10-30 PROBLEM — N13.8 BPH WITH OBSTRUCTION/LOWER URINARY TRACT SYMPTOMS: Status: ACTIVE | Noted: 2024-10-30

## 2024-10-30 PROBLEM — N40.1 BPH NOS W UR OBS/LUTS: Status: ACTIVE | Noted: 2024-10-30

## 2024-10-30 PROBLEM — N40.1 BPH WITH OBSTRUCTION/LOWER URINARY TRACT SYMPTOMS: Status: ACTIVE | Noted: 2024-10-30

## 2024-10-30 PROCEDURE — 0VB08ZZ EXCISION OF PROSTATE, VIA NATURAL OR ARTIFICIAL OPENING ENDOSCOPIC: ICD-10-PCS | Performed by: SURGERY

## 2024-10-30 PROCEDURE — 99215 OFFICE O/P EST HI 40 MIN: CPT | Performed by: STUDENT IN AN ORGANIZED HEALTH CARE EDUCATION/TRAINING PROGRAM

## 2024-10-30 RX ORDER — SENNOSIDES 8.6 MG
17.2 TABLET ORAL NIGHTLY PRN
Status: DISCONTINUED | OUTPATIENT
Start: 2024-10-30 | End: 2024-10-31

## 2024-10-30 RX ORDER — BISACODYL 10 MG
10 SUPPOSITORY, RECTAL RECTAL
Status: DISCONTINUED | OUTPATIENT
Start: 2024-10-30 | End: 2024-10-31

## 2024-10-30 RX ORDER — ALBUTEROL SULFATE 0.83 MG/ML
2.5 SOLUTION RESPIRATORY (INHALATION) AS NEEDED
Status: DISCONTINUED | OUTPATIENT
Start: 2024-10-30 | End: 2024-10-30 | Stop reason: HOSPADM

## 2024-10-30 RX ORDER — POLYETHYLENE GLYCOL 3350 17 G/17G
17 POWDER, FOR SOLUTION ORAL DAILY PRN
Qty: 20 PACKET | Refills: 1 | Status: SHIPPED | OUTPATIENT
Start: 2024-10-30

## 2024-10-30 RX ORDER — DONEPEZIL HYDROCHLORIDE 5 MG/1
10 TABLET, FILM COATED ORAL DAILY
Status: DISCONTINUED | OUTPATIENT
Start: 2024-10-31 | End: 2024-10-31

## 2024-10-30 RX ORDER — FLUTICASONE PROPIONATE AND SALMETEROL 250; 50 UG/1; UG/1
1 POWDER RESPIRATORY (INHALATION) 2 TIMES DAILY
COMMUNITY

## 2024-10-30 RX ORDER — FLUTICASONE PROPIONATE AND SALMETEROL 250; 50 UG/1; UG/1
1 POWDER RESPIRATORY (INHALATION) 2 TIMES DAILY
Status: DISCONTINUED | OUTPATIENT
Start: 2024-10-30 | End: 2024-10-31

## 2024-10-30 RX ORDER — HYDROCODONE BITARTRATE AND ACETAMINOPHEN 5; 325 MG/1; MG/1
1 TABLET ORAL EVERY 6 HOURS PRN
Status: DISCONTINUED | OUTPATIENT
Start: 2024-10-30 | End: 2024-10-31

## 2024-10-30 RX ORDER — NALOXONE HYDROCHLORIDE 0.4 MG/ML
0.08 INJECTION, SOLUTION INTRAMUSCULAR; INTRAVENOUS; SUBCUTANEOUS AS NEEDED
Status: DISCONTINUED | OUTPATIENT
Start: 2024-10-30 | End: 2024-10-30 | Stop reason: HOSPADM

## 2024-10-30 RX ORDER — ALBUTEROL SULFATE 90 UG/1
1 INHALANT RESPIRATORY (INHALATION) EVERY 6 HOURS PRN
Status: DISCONTINUED | OUTPATIENT
Start: 2024-10-30 | End: 2024-10-31

## 2024-10-30 RX ORDER — ACETAMINOPHEN 500 MG
1000 TABLET ORAL ONCE AS NEEDED
Status: DISCONTINUED | OUTPATIENT
Start: 2024-10-30 | End: 2024-10-30 | Stop reason: HOSPADM

## 2024-10-30 RX ORDER — OXYCODONE HYDROCHLORIDE 10 MG/1
10 TABLET ORAL EVERY 4 HOURS PRN
Status: DISCONTINUED | OUTPATIENT
Start: 2024-10-30 | End: 2024-10-31

## 2024-10-30 RX ORDER — ONDANSETRON 4 MG/1
4 TABLET, ORALLY DISINTEGRATING ORAL EVERY 6 HOURS PRN
Status: DISCONTINUED | OUTPATIENT
Start: 2024-10-30 | End: 2024-10-31

## 2024-10-30 RX ORDER — SODIUM CHLORIDE, SODIUM LACTATE, POTASSIUM CHLORIDE, CALCIUM CHLORIDE 600; 310; 30; 20 MG/100ML; MG/100ML; MG/100ML; MG/100ML
INJECTION, SOLUTION INTRAVENOUS CONTINUOUS
Status: DISCONTINUED | OUTPATIENT
Start: 2024-10-30 | End: 2024-10-30

## 2024-10-30 RX ORDER — POLYETHYLENE GLYCOL 3350 17 G/17G
17 POWDER, FOR SOLUTION ORAL DAILY
Status: DISCONTINUED | OUTPATIENT
Start: 2024-10-30 | End: 2024-10-31

## 2024-10-30 RX ORDER — SODIUM CHLORIDE, SODIUM LACTATE, POTASSIUM CHLORIDE, CALCIUM CHLORIDE 600; 310; 30; 20 MG/100ML; MG/100ML; MG/100ML; MG/100ML
INJECTION, SOLUTION INTRAVENOUS CONTINUOUS
Status: DISCONTINUED | OUTPATIENT
Start: 2024-10-30 | End: 2024-10-30 | Stop reason: HOSPADM

## 2024-10-30 RX ORDER — ONDANSETRON 2 MG/ML
4 INJECTION INTRAMUSCULAR; INTRAVENOUS EVERY 6 HOURS PRN
Status: DISCONTINUED | OUTPATIENT
Start: 2024-10-30 | End: 2024-10-31

## 2024-10-30 RX ORDER — SODIUM CHLORIDE 9 MG/ML
INJECTION, SOLUTION INTRAVENOUS CONTINUOUS
Status: DISCONTINUED | OUTPATIENT
Start: 2024-10-30 | End: 2024-10-31

## 2024-10-30 RX ORDER — ONDANSETRON 2 MG/ML
4 INJECTION INTRAMUSCULAR; INTRAVENOUS EVERY 6 HOURS PRN
Status: DISCONTINUED | OUTPATIENT
Start: 2024-10-30 | End: 2024-10-30 | Stop reason: HOSPADM

## 2024-10-30 RX ORDER — ACETAMINOPHEN 500 MG
1000 TABLET ORAL ONCE
Status: DISCONTINUED | OUTPATIENT
Start: 2024-10-30 | End: 2024-10-30 | Stop reason: HOSPADM

## 2024-10-30 RX ORDER — OXYCODONE HYDROCHLORIDE 5 MG/1
5 TABLET ORAL EVERY 4 HOURS PRN
Status: DISCONTINUED | OUTPATIENT
Start: 2024-10-30 | End: 2024-10-31

## 2024-10-30 RX ORDER — FINASTERIDE 5 MG/1
5 TABLET, FILM COATED ORAL NIGHTLY
Status: DISCONTINUED | OUTPATIENT
Start: 2024-10-30 | End: 2024-10-31

## 2024-10-30 RX ORDER — ACETAMINOPHEN 500 MG
1000 TABLET ORAL EVERY 8 HOURS SCHEDULED
Status: DISCONTINUED | OUTPATIENT
Start: 2024-10-30 | End: 2024-10-31

## 2024-10-30 RX ORDER — SULFAMETHOXAZOLE AND TRIMETHOPRIM 800; 160 MG/1; MG/1
1 TABLET ORAL 2 TIMES DAILY
Qty: 6 TABLET | Refills: 0 | Status: SHIPPED | OUTPATIENT
Start: 2024-10-30 | End: 2024-11-02

## 2024-10-30 RX ORDER — BUPIVACAINE HYDROCHLORIDE 7.5 MG/ML
INJECTION, SOLUTION INTRASPINAL AS NEEDED
Status: DISCONTINUED | OUTPATIENT
Start: 2024-10-30 | End: 2024-10-30 | Stop reason: SURG

## 2024-10-30 RX ORDER — DOXYCYCLINE HYCLATE 100 MG
100 TABLET ORAL 2 TIMES DAILY
COMMUNITY
Start: 2024-10-25 | End: 2024-10-31

## 2024-10-30 RX ORDER — DONEPEZIL HYDROCHLORIDE 5 MG/1
10 TABLET, FILM COATED ORAL NIGHTLY
Status: DISCONTINUED | OUTPATIENT
Start: 2024-10-30 | End: 2024-10-30

## 2024-10-30 RX ORDER — IPRATROPIUM BROMIDE AND ALBUTEROL SULFATE 2.5; .5 MG/3ML; MG/3ML
3 SOLUTION RESPIRATORY (INHALATION) EVERY 6 HOURS PRN
COMMUNITY

## 2024-10-30 RX ORDER — LATANOPROST 50 UG/ML
1 SOLUTION/ DROPS OPHTHALMIC NIGHTLY
Status: DISCONTINUED | OUTPATIENT
Start: 2024-10-30 | End: 2024-10-31

## 2024-10-30 RX ADMIN — BUPIVACAINE HYDROCHLORIDE 1 ML: 7.5 INJECTION, SOLUTION INTRASPINAL at 12:15:00

## 2024-10-30 RX ADMIN — SODIUM CHLORIDE, SODIUM LACTATE, POTASSIUM CHLORIDE, CALCIUM CHLORIDE: 600; 310; 30; 20 INJECTION, SOLUTION INTRAVENOUS at 11:58:00

## 2024-10-30 RX ADMIN — SODIUM CHLORIDE, SODIUM LACTATE, POTASSIUM CHLORIDE, CALCIUM CHLORIDE: 600; 310; 30; 20 INJECTION, SOLUTION INTRAVENOUS at 14:13:00

## 2024-10-30 RX ADMIN — BUPIVACAINE HYDROCHLORIDE 1 ML: 7.5 INJECTION, SOLUTION INTRASPINAL at 12:22:00

## 2024-10-30 NOTE — CONSULTS
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  Patient underwent TURP with Dr. Sims today.    History/Other:    Past Medical History:  Past Medical History:    AAA (abdominal aortic aneurysm) (HCC)    Asthma (HCC)    Back problem    BPH (benign prostatic hyperplasia)    COPD (chronic obstructive pulmonary disease) (HCC)    2L O2 prn    Hearing impaired person, bilateral    not wearing hearing aids    Osteoarthritis    Problems with swallowing    Tobacco use    Visual impairment    glasses     Past Surgical History:   Past Surgical History:   Procedure Laterality Date    Arthroscopy of joint unlisted Left     Cystoscopy,insert ureteral stent      Hernia repair        Family History:   History reviewed. No pertinent family history.  Social History:    reports that he has been smoking cigarettes. He has a 60 pack-year smoking history. He has never used smokeless tobacco. He reports that he does not currently use alcohol. He reports that he does not use drugs.     Allergies: Allergies[1]    Medications:  Medications Ordered Prior to Encounter[2]    Review of Systems:   A comprehensive review of systems was completed.    Pertinent positives and negatives noted in the HPI.    Objective:   Physical Exam:    /51 (BP Location: Left arm)   Pulse 77   Temp 97.8 °F (36.6 °C) (Oral)   Resp 20   Ht 5' 7\" (1.702 m)   Wt 170 lb (77.1 kg)   SpO2 99%   BMI 26.63 kg/m²   General: No acute distress, Alert  Respiratory: No rhonchi,  no wheezes  Cardiovascular: S1, S2. Regular rate and rhythm  Abdomen: Soft, NT/ND, +BS  Neuro: No new focal deficits  Extremities: No edema    Results:    Labs:      Labs Last 24 Hours:  No results for input(s): \"WBC\", \"HGB\", \"MCV\", \"PLT\", \"BAND\", \"INR\" in the last 168 hours.    Invalid input(s): \"LYM#\", \"MONO#\", \"BASOS#\", \"EOSIN#\"    No results for input(s): \"GLU\", \"BUN\", \"CREATSERUM\", \"GFRAA\", \"GFRNAA\", \"CA\", \"ALB\", \"NA\", \"K\", \"CL\", \"CO2\", \"ALKPHO\", \"AST\", \"ALT\", \"BILT\", \"TP\" in the last 168 hours.    No results for input(s): \"PTP\", \"INR\" in the last 168 hours.    No results for input(s): \"TROP\", \"CK\" in the last 168 hours.      Imaging: Imaging data reviewed in Epic.    Assessment & Plan:      #Bladder outlet obstruction due to BPH  -S/p TURP  -Management per urology    #COPD  -Home inhalers        Plan of care discussed with patient     Mil Dillon DO  10/30/2024    The 21st Century Cures Act makes medical notes like these available to patients in the interest of transparency. Please be advised this is a medical document. Medical documents are intended to carry relevant information, facts as evident, and the clinical opinion of the practitioner. The medical note is intended as peer to peer communication and may appear blunt or direct. It is written in medical language and may contain abbreviations or verbiage that are unfamiliar.            [1]   Allergies  Allergen Reactions    Ciprofloxacin OTHER (SEE COMMENTS)     Heel pain    Ibuprofen ARRHYTHMIA    Methocarbamol OTHER (SEE COMMENTS)     Pt states causes headache       Flexeril [Cyclobenzaprine Hcl] PAIN     Pt states causes Headache    Strawberry NAUSEA AND VOMITING    Mg Hydroxide-Quinapril Hcl OTHER (SEE COMMENTS)     High blood pressure and HA    Pcn [Penicillins] RASH   [2]   No current facility-administered medications on file prior to encounter.     Current Outpatient Medications on File Prior to Encounter   Medication Sig Dispense Refill     Doxycycline Hyclate 100 MG Oral Tab Take 1 tablet (100 mg total) by mouth 2 (two) times daily.      fluticasone-salmeterol 250-50 MCG/ACT Inhalation Aerosol Powder, Breath Activated Inhale 1 puff into the lungs 2 (two) times daily.      Phenazopyridine HCl (AZO TABS OR) Take 2 tablets by mouth daily as needed.      Calcium Carbonate Antacid (LENNOX-SELTZER HEARTBURN OR) Take 1 tablet by mouth daily as needed.      diphenhydrAMINE HCl (BENADRYL OR) Take 1 tablet by mouth daily as needed.      Simethicone (GAS-X OR) Take 1 tablet by mouth daily as needed.      guaiFENesin (MUCINEX OR) Take 1 tablet by mouth daily.      ipratropium-albuterol 0.5-2.5 (3) MG/3ML Inhalation Solution Take 3 mL by nebulization every 6 (six) hours as needed.      donepezil 10 MG Oral Tab Take 1 tablet (10 mg total) by mouth nightly.      predniSONE 10 MG Oral Tab Take 1 tablet (10 mg total) by mouth daily.      fluticasone propionate 50 MCG/ACT Nasal Suspension 2 sprays by Nasal route daily.      latanoprost 0.005 % Ophthalmic Solution Place 1 drop into both eyes nightly.      furosemide 20 MG Oral Tab Take 1 tablet (20 mg total) by mouth daily.  0    aspirin 81 MG Oral Tab EC Take 1 tablet (81 mg total) by mouth at bedtime.      doxazosin 8 MG Oral Tab Take 1 tablet (8 mg total) by mouth nightly. For Prostate      tolterodine 2 MG Oral Tab Take 1 tablet (2 mg total) by mouth 2 (two) times daily.      finasteride 5 MG Oral Tab Take 1 tablet (5 mg total) by mouth nightly.      albuterol 108 (90 Base) MCG/ACT Inhalation Aero Soln Inhale 1 puff into the lungs every 6 (six) hours as needed for Wheezing.      HYDROcodone-acetaminophen 5-325 MG Oral Tab Take 1 tablet by mouth every 6 (six) hours as needed for Pain.      albuterol (2.5 MG/3ML) 0.083% Inhalation Nebu Soln Take 3 mL (2.5 mg total) by nebulization every 6 (six) hours as needed for Wheezing.

## 2024-10-30 NOTE — DISCHARGE INSTRUCTIONS
You had a transurethral resection of the prostate (TURP) in the operating room today.    Instructions:    - No heavy lifting or strenuous activity for 4 weeks. You should continue walking and light activity daily.    - Make sure to avoid constipation. I prescribed Miralax for you to take daily at first. You can stop this if you start having loose stools or diarrhea, but the goal is for 1-2 soft bowel movements without straining, every day for the next 4 weeks.    - Your follow-up appointment is listed below. Please contact us at [phone] if you need to change your appointment.    Your appointments       Date & Time Appointment Department ([place])    Nov 26, 2024 11:15 AM CST Post Op Visit with Alok Sims MD [facility] ([place] [id number])              [facility] [address]  [address]  [facility] [id number] [phone]            - You may experience mild pain after the procedure for a few days.  If the pain becomes intolerable please contact our office or go to the nearest Emergency Room or Urgent Care. You should take over the counter tylenol for mild pain, and can alternate with ibuprofen (AKA motrin, Advil - provided you do not have a medical condition such as stomach ulcers or kidney disease which prohibits you from taking these) if pain persists. If pain is still not relieved by tylenol and/or ibuprofen, you may take narcotic pain medication if prescribed (typically oxycodone or tramadol). If you are taking narcotic pain medication this can make you constipated, so you should take over the counter stool softeners or miralax if prescribed.    - Warm pack or hot baths often help with discomfort after cystoscopy.    - If you take blood thinners (such as aspirin or plavix) please hold these medications until 7 days after surgery.     - You may experience burning and frequency of urination over  the next few days. This will improve after a few days if you stay well hydrated.      - You are likely to see some blood in your urine (pink or light red urine) that should clear up within a few days. Staying well hydrated should help this clear up. If you notice the urine stays dark red or there are multiple large blood clots despite good hydration, please call the urology clinic ([phone]).     - Try to abstain from alcohol, coffee, tea, artificial sweeteners, and spicy food for the next 48 hours as these can irritate the bladder.    - Drink 1.5 to 2 liters of fluid today (water is preferable). If you are on a fluid restriction due to other medical reasons then you need to adhere to your fluid restriction recommendations.    - If you are discharged with a Fenton catheter in place, you will be scheduled to return for removal. If you were prescribed Oxybutynin on discharge (to help with bladder spasms), you should stop taking this 24 hours before your follow-up appointment. If the Fenton catheter stops draining, reposition yourself and check for kinks in the tubing. If this persists and you are not seeing any urine drain, and you notice bladder discomfort please call or go to the nearest emergency department as soon as possible.     - If you develop fevers / chills, difficulty urinating, or severe abdominal pain please call the office or go to the nearest emergency department.       Alok Sims MD  Staff Urologist  [facility]  Office: [phone]

## 2024-10-30 NOTE — OPERATIVE REPORT
Urology Operative Note    Attending Surgeon: Alok Sims MD    Assistant Surgeon: None    Patient Name: Tung Maria    Date of Surgery: 10/30/2024    Preoperative Diagnosis: Bladder outlet obstruction due to BPH    Postoperative Diagnosis: Same    Procedure Performed: Cystoscopy, urethral dilation, transurethral resection of prostate    Indication:  Patient is a 89 year old male with 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 The patient was counseled on options, risks, and benefits and elected to undergo the above procedure. We discussed risks including, but not limited to, bleeding, infection, damage to surrounding structures, need for repeat procedure, urinary retention, erectile dysfunction, retrograde ejaculation, and urinary incontinence. The patient understood these risks and wished to proceed with surgery.    Findings:  Trilobar hyperplasia with obstructive appearance - all lobes resected down to capsule. Many prostatic crypts opened up filled with prostate stones and debris.    Procedure:  The patient was taken to the operating room and a timeout was performed confirming the correct patient and procedure. The patient was prepped and draped in lithotomy position after undergoing general anesthesia. Pre-operative prophylactic antibiotics were given in the form of Ancef.    Sumner urethral sounds were used to sequentially dilate the distal urethra up to 28 [place], in order to accommodate the resectoscope.    The resectoscope was inserted per urethra and the bladder was inspected and drained. The prostate appeared obstructive. Using the bipolar loop, the obstructive lateral and median lobes of the prostate were resected down to the level of the capsule. Bleeding vessels were fulgurated whenever encountered. Care was taken to avoid resection of the ureteral orifices or the verumontanum. After resection was complete, the prostatic fossa appeared wide open. The  prostate chips were removed from the bladder using an Ellick evacuator, and sent for specimen. Additional fulguration was performed, and hemostasis was excellent after this. The scope was removed.     A 22-Armenian 3-way Fenton catheter was placed into the bladder and 40 mL of sterile water was put into the balloon. This was connected to continuous bladder irrigation.    The patient was awoken from anesthesia and transferred to PACU in stable condition. The patient tolerated the procedure well. All instrument/supply counts were correct at the end of the case.    Specimens:   Prostate chips    Estimated Blood Loss:  40 mL    Tubes/Drains:  22-Armenian 3-way Fenton catheter to CBI    Complications:   None immediate    Condition from OR:  Stable    Plan:   The patient will be admitted to the hospital overnight with continuous bladder irrigation. The CBI will be clamped in the morning and based on the urine color it will be determined if a void trial is appropriate.      Alok Sims MD  Staff Urologist  [facility]  Office: [phone]

## 2024-10-30 NOTE — ANESTHESIA POSTPROCEDURE EVALUATION
[facility]    Tung Maria Patient Status:  Outpatient in a Bed   Age/Gender 89 year old male MRN [id number]   Location [facility] SURGERY Attending Alok Sims MD   Hosp Day # 0 PCP Shima Tobar MD       Anesthesia Post-op Note    TRANSURETHRAL RESECTION PROSTATE    Procedure Summary       Date: 10/30/24 Room / Location:  MAIN OR 07 / [facility] MAIN OR    Anesthesia Start: 1154 Anesthesia Stop: 1421    Procedure: TRANSURETHRAL RESECTION PROSTATE (Bladder) Diagnosis:       Benign prostatic hyperplasia, unspecified whether lower urinary tract symptoms present      (Benign prostatic hyperplasia, unspecified whether lower urinary tract symptoms present [N40.0])    Surgeons: Alok Sims MD Anesthesiologist: Srinivas Del Real MD    Anesthesia Type: spinal ASA Status: 3            Anesthesia Type: spinal    Vitals Value Taken Time   /55 10/30/24 1426   Temp 97.9 10/30/24 1426   Pulse 86 10/30/24 1426   Resp 18 10/30/24 1426   SpO2 95% 10/30/24 1426       Patient Location: PACU    Anesthesia Type: spinal    Airway Patency: patent    Postop Pain Control: adequate    Mental Status: mildly sedated but able to meaningfully participate in the post-anesthesia evaluation    Nausea/Vomiting: none    Cardiopulmonary/Hydration status: stable euvolemic    Complications: no apparent anesthesia related complications    Postop vital signs: stable    Dental Exam: Unchanged from Preop    Patient to be discharged from PACU when criteria met.

## 2024-10-30 NOTE — PROGRESS NOTES
Patient alert and oriented x 4. VSS on 4L oxygen (patient's baseline). Hard of hearing, no aids. Glasses and dentures at bedside. CBI intact. Admission navigator complete.

## 2024-10-30 NOTE — ANESTHESIA PREPROCEDURE EVALUATION
PRE-OP EVALUATION    Patient Name: Tung Maria    Admit Diagnosis: Benign prostatic hyperplasia, unspecified whether lower urinary tract symptoms present [N40.0]    Pre-op Diagnosis: Benign prostatic hyperplasia, unspecified whether lower urinary tract symptoms present [N40.0]    TRANSURETHRAL RESECTION PROSTATE    Anesthesia Procedure: TRANSURETHRAL RESECTION PROSTATE (Bladder)    Surgeons and Role:     * Alok Sims MD - Primary    Pre-op vitals reviewed.  Temp: 98 °F (36.7 °C)  Pulse: 82  Resp: 18  BP: 116/60  SpO2: 94 %  Body mass index is 26.63 kg/m².    Current medications reviewed.  Hospital Medications:  • [Transfer Hold] acetaminophen (Tylenol Extra Strength) tab 1,000 mg  1,000 mg Oral Once   • lactated ringers infusion   Intravenous Continuous   • [COMPLETED] ceFAZolin (Ancef) 2g in 10mL IV syringe premix  2 g Intravenous Once       Outpatient Medications:   Prescriptions Prior to Admission[1]    Allergies: Ciprofloxacin, Ibuprofen, Methocarbamol, Flexeril [cyclobenzaprine hcl], Strawberry, Mg hydroxide-quinapril hcl, and Pcn [penicillins]      Anesthesia Evaluation    Patient summary reviewed.    Anesthetic Complications  (-) history of anesthetic complications         GI/Hepatic/Renal    Negative GI/hepatic/renal ROS.                             Cardiovascular    Negative cardiovascular ROS.                                                   Endo/Other    Negative endo/other ROS.                              Pulmonary      (+) asthma  (+) COPD   COPD requiring home oxygen.    (+) shortness of breath            Neuro/Psych    Negative neuro/psych ROS.                              Past Surgical History:   Procedure Laterality Date   • Arthroscopy of joint unlisted Left    • Hernia repair       Social History     Socioeconomic History   • Marital status:    Tobacco Use   • Smoking status: Every Day     Current packs/day: 1.00     Average packs/day: 1 pack/day for 60.0 years (60.0 ttl pk-yrs)      Types: Cigarettes   • Smokeless tobacco: Never   • Tobacco comments:     60 PACK YEARS- 0.5 daily     Pt trying to quit smoking as of 9/20/2024   Vaping Use   • Vaping status: Never Used   Substance and Sexual Activity   • Alcohol use: Not Currently     Comment: rare   • Drug use: No     History   Drug Use No     Available pre-op labs reviewed.  Lab Results   Component Value Date    WBC 10.9 10/18/2024    RBC 3.80 10/18/2024    HGB 11.7 (L) 10/18/2024    HCT 37.6 (L) 10/18/2024    MCV 98.9 10/18/2024    MCH 30.8 10/18/2024    MCHC 31.1 10/18/2024    RDW 13.0 10/18/2024    .0 10/18/2024     Lab Results   Component Value Date     10/18/2024    K 3.9 10/18/2024     10/18/2024    CO2 37.0 (H) 10/18/2024    BUN 13 10/18/2024    CREATSERUM 0.82 10/18/2024     (H) 10/18/2024    CA 9.0 10/18/2024     Lab Results   Component Value Date    INR 1.02 10/18/2024         Airway      Mallampati: II  Mouth opening: 3 FB  TM distance: 4 - 6 cm  Neck ROM: full Cardiovascular    Cardiovascular exam normal.         Dental      Dental appliance(s): upper dentures       Pulmonary    Pulmonary exam normal.                 Other findings        ASA: 3   Plan: spinal  NPO status verified and patient meets guidelines.        Comment: Pulmonology prefers spinal per their note. I discussed both GA and spinal and we decided on spinal. We discussed a spinal anesthetic today. I explained that spinal anesthesia is an injection into the back that places some numbing medication in the the spinal fluid that will numb the lower part of the body for a few hours. Risks include bleeding and infection that can cause a hematoma or abscess that can cause serious and permanent consequences. The spinal injection can cause a headache. The spinal injection may not work as well as we like or it may start to wear off. In those situations we have general anesthesia as a backup plan. Sometimes its difficult to place the spinal. In  those cases we may elect to use general anesthesia. All questions answered and risks explained.                Present on Admission:  **None**             [1]   Medications Prior to Admission   Medication Sig Dispense Refill Last Dose/Taking   • Doxycycline Hyclate 100 MG Oral Tab Take 1 tablet (100 mg total) by mouth 2 (two) times daily.   10/30/2024 at  8:00 AM   • fluticasone-salmeterol 250-50 MCG/ACT Inhalation Aerosol Powder, Breath Activated Inhale 1 puff into the lungs 2 (two) times daily.   10/29/2024 at  7:00 PM   • Phenazopyridine HCl (AZO TABS OR) Take 2 tablets by mouth daily as needed.   10/29/2024 at  8:00 AM   • Calcium Carbonate Antacid (LENNOX-SELTZER HEARTBURN OR) Take 1 tablet by mouth daily as needed.   10/23/2024   • diphenhydrAMINE HCl (BENADRYL OR) Take 1 tablet by mouth daily as needed.   10/29/2024 at  7:00 PM   • Simethicone (GAS-X OR) Take 1 tablet by mouth daily as needed.   10/30/2024 at  8:00 AM   • guaiFENesin (MUCINEX OR) Take 1 tablet by mouth daily.   10/29/2024 at  8:00 AM   • ipratropium-albuterol 0.5-2.5 (3) MG/3ML Inhalation Solution Take 3 mL by nebulization every 6 (six) hours as needed.   10/30/2024 at  8:00 AM   • donepezil 10 MG Oral Tab Take 1 tablet (10 mg total) by mouth nightly.   10/29/2024 at  7:00 PM   • predniSONE 10 MG Oral Tab Take 1 tablet (10 mg total) by mouth daily.   10/30/2024 at  8:00 AM   • fluticasone propionate 50 MCG/ACT Nasal Suspension 2 sprays by Nasal route daily.   10/30/2024 at  8:00 AM   • latanoprost 0.005 % Ophthalmic Solution Place 1 drop into both eyes nightly.   10/29/2024   • furosemide 20 MG Oral Tab Take 1 tablet (20 mg total) by mouth daily.  0 10/28/2024   • aspirin 81 MG Oral Tab EC Take 1 tablet (81 mg total) by mouth at bedtime.   10/23/2024   • doxazosin 8 MG Oral Tab Take 1 tablet (8 mg total) by mouth nightly. For Prostate   10/29/2024 at  9:30 PM   • tolterodine 2 MG Oral Tab Take 1 tablet (2 mg total) by mouth 2 (two) times daily.    10/29/2024 at  7:00 PM   • finasteride 5 MG Oral Tab Take 1 tablet (5 mg total) by mouth nightly.   10/29/2024 at  9:30 PM   • albuterol 108 (90 Base) MCG/ACT Inhalation Aero Soln Inhale 1 puff into the lungs every 6 (six) hours as needed for Wheezing.   More than a month   • HYDROcodone-acetaminophen 5-325 MG Oral Tab Take 1 tablet by mouth every 6 (six) hours as needed for Pain.   More than a month   • albuterol (2.5 MG/3ML) 0.083% Inhalation Nebu Soln Take 3 mL (2.5 mg total) by nebulization every 6 (six) hours as needed for Wheezing.   More than a month

## 2024-10-30 NOTE — ANESTHESIA PROCEDURE NOTES
Spinal Block    Date/Time: 10/30/2024 12:15 PM    Performed by: Brennen Victoria CRNA  Authorized by: Srinivas Del Real MD      General Information and Staff    Start Time:  10/30/2024 12:15 PM  End Time:  10/30/2024 12:25 PM  Anesthesiologist:  Srinivas Del Real MD  CRNA:  Brennen Victoria CRNA  Performed by:  CRNA  Site identification: surface landmarks    Reason for Block: surgical anesthesia    Preanesthetic Checklist: patient identified, IV checked, risks and benefits discussed, monitors and equipment checked, pre-op evaluation, timeout performed, anesthesia consent and sterile technique used      Procedure Details    Patient Position:  Sitting  Prep: ChloraPrep    Monitoring:  Cardiac monitor, heart rate and continuous pulse ox  Approach:  Midline  Location:  L3-4  Injection Technique:  Single-shot    Needle    Needle Type:  Sprotte  Needle Gauge:  24 G  Needle Length:  3.5 in    Assessment    Sensory Level:  T12  Events: clear CSF, CSF aspirated, well tolerated and blood negative      Additional Comments     Initial spinal @L4-5 with +CSF, -heme, 1ml 0.75% hyperbaric bupivacaine intrathecal injection, remained in upright position but failed to achieve dense enough saddle block, Repeat spinal @ L3-4with +CSF, -heme, 1ml 0.75% hyperbaric bupivacaine intrathecal injection

## 2024-10-30 NOTE — PROGRESS NOTES
NURSING ADMISSION NOTE      Patient admitted via  bed  Oriented to room.  Safety precautions initiated.  Bed in low position.  Call light in reach.    Skin check completed w/ Padmini PCT.     1631: Consult to hospitalist called.

## 2024-10-31 VITALS
SYSTOLIC BLOOD PRESSURE: 150 MMHG | OXYGEN SATURATION: 95 % | BODY MASS INDEX: 26.68 KG/M2 | HEART RATE: 90 BPM | RESPIRATION RATE: 18 BRPM | HEIGHT: 67 IN | TEMPERATURE: 98 F | DIASTOLIC BLOOD PRESSURE: 63 MMHG | WEIGHT: 170 LBS

## 2024-10-31 LAB
ANION GAP SERPL CALC-SCNC: 3 MMOL/L (ref 0–18)
BUN BLD-MCNC: 14 MG/DL (ref 9–23)
CALCIUM BLD-MCNC: 8.4 MG/DL (ref 8.7–10.4)
CHLORIDE SERPL-SCNC: 105 MMOL/L (ref 98–112)
CO2 SERPL-SCNC: 34 MMOL/L (ref 21–32)
CREAT BLD-MCNC: 0.76 MG/DL
EGFRCR SERPLBLD CKD-EPI 2021: 86 ML/MIN/1.73M2 (ref 60–?)
ERYTHROCYTE [DISTWIDTH] IN BLOOD BY AUTOMATED COUNT: 13.2 %
GLUCOSE BLD-MCNC: 97 MG/DL (ref 70–99)
HCT VFR BLD AUTO: 36.2 %
HGB BLD-MCNC: 11.3 G/DL
MCH RBC QN AUTO: 30.5 PG (ref 26–34)
MCHC RBC AUTO-ENTMCNC: 31.2 G/DL (ref 31–37)
MCV RBC AUTO: 97.8 FL
OSMOLALITY SERPL CALC.SUM OF ELEC: 294 MOSM/KG (ref 275–295)
PLATELET # BLD AUTO: 243 10(3)UL (ref 150–450)
POTASSIUM SERPL-SCNC: 3.6 MMOL/L (ref 3.5–5.1)
RBC # BLD AUTO: 3.7 X10(6)UL
SODIUM SERPL-SCNC: 142 MMOL/L (ref 136–145)
WBC # BLD AUTO: 14.3 X10(3) UL (ref 4–11)

## 2024-10-31 PROCEDURE — 99213 OFFICE O/P EST LOW 20 MIN: CPT | Performed by: INTERNAL MEDICINE

## 2024-10-31 RX ORDER — PREDNISONE 10 MG/1
10 TABLET ORAL
Status: DISCONTINUED | OUTPATIENT
Start: 2024-10-31 | End: 2024-10-31

## 2024-10-31 RX ORDER — DOXYCYCLINE 100 MG/1
100 CAPSULE ORAL 2 TIMES DAILY
Status: DISCONTINUED | OUTPATIENT
Start: 2024-10-31 | End: 2024-10-31

## 2024-10-31 NOTE — PROGRESS NOTES
[facility]   part of [facility]    Progress Note    Tung Maria Patient Status:  Outpatient in a Bed    7/15/1935 MRN [id number]   Location [facility] 3NW-A Attending Alok Sims MD   Hosp Day # 0 PCP Shima Tobar MD     Subjective:   Tung Maria is a(n) 89 year old male s/p TURP 10/30/24.    CBI clamped this morning.  Denies any pain.    Objective:   Blood pressure 100/58, pulse 80, temperature 97.9 °F (36.6 °C), temperature source Oral, resp. rate 14, height 5' 7\" (1.702 m), weight 170 lb (77.1 kg), SpO2 99%.    No distress  Non labored respirations  Hollins draining ashutosh aide in color, no clots off CBI -clears quickly with CBI    Results:   Lab Results   Component Value Date    WBC 14.3 (H) 10/31/2024    HGB 11.3 (L) 10/31/2024    HCT 36.2 (L) 10/31/2024    .0 10/31/2024    CREATSERUM 0.76 10/31/2024    BUN 14 10/31/2024     10/31/2024    K 3.6 10/31/2024     10/31/2024    CO2 34.0 (H) 10/31/2024    GLU 97 10/31/2024    CA 8.4 (L) 10/31/2024    ALB 4.3 2024    ALKPHO 81 2024    BILT 0.4 2024    TP 7.2 2024    AST 10 2024    ALT 11 2024    PTT 32.3 2022    INR 1.02 10/18/2024    DDIMER 0.38 2024    CRP 5.37 (H) 2022    TROPHS 5 2024       No results found.        Assessment & Plan:   BPH with obstruction/lower urinary tract symptoms  S/p TURP 10/30/24  AF, HD stable  Labs reviewed  Urine clearing appropriately    PLAN - Recommend d'c home with hollins catheter. Restrictions reviewed. Return on Monday for VT.    Shira Cody, LEAH  [facility]  10/31/2024    ADDENDUM- Paged by nursing staff. Patient declining discharge with hollins catheter, requesting VT today. Discussed with attending, will initiate VT with potential for hollins replacement if post void residuals elevated.  Tip LYNN

## 2024-10-31 NOTE — PLAN OF CARE
Pt here from: Home with wife.   Neuro: A&Ox4, VSS, 4L baseline, , IS. Denies cough, chest pain, and SOB.  GI: Abdomen soft, passing gas and belching. Denies nausea. Last BM was 10/29.   : Voids via hollins, red and clear. Pt will go home with hollins.   Pt declines pain medication at this time.    Up ad carey.   Diet: Tolerating regular diet.   IVF running per order through [facility].    All appropriate safety measures in place. All questions and concerns addressed. Bed locked and in lowest position. Call light in reach.     DC today. Waiting for hospitalist to see.     1145: Pt refused to go home with hollins. Messaged urology. She said ok to pull the hollins and do a void trial and PVR. If pt cannot void, hollins gets put back in. Pt aware and educated.     1628: Pt unable to void adequately, per urology, hollins back in, DC pt home with hollins.     Patient is alert and oriented x4. Denies nausea or vomiting. VSS, tolerating diet, voiding adequately, pain controlled, tolerating ambulating. Discharge education provided. Reviewed medications and follow up appts. All questions answered and concerns addressed, pt verbalized understanding. Pt DC in stable condition. PIV removed and intact. Pt assessed and ready for discharge.

## 2024-10-31 NOTE — PLAN OF CARE
Patient alert and oriented x4, vital signs stable on 4L nasal cannula, consistent with baseline. With glasses, hard of hearing. IV fluids infusing, tolerating diet, CBI running slow. Up with standby assist. Impulsive at times, educated importance of call light. Safety measures in place, questions addressed, plan of care discussed with patient. Skin check performed upon admission by Norma VELEZ and Padmini ORTEGA.

## 2024-10-31 NOTE — DISCHARGE SUMMARY
[facility]   part of [facility]    Discharge Summary    Tung Maria Patient Status:  Outpatient in a Bed    7/15/1935 MRN [id number]   Location [facility] 3NW-A Attending Alok Sims MD   Hosp Day # 0 PCP Shima Tobar MD     Date of Admission: 10/30/2024     Disposition: Home     Date of Discharge: 10/31/2024    Admitting Diagnosis: Benign prostatic hyperplasia, unspecified whether lower urinary tract symptoms present [N40.0]  Benign prostatic hyperplasia, unspecified whether lower urinary tract symptoms present    Discharge Diagnosis:   Patient Active Problem List   Diagnosis    Dyspnea    Dyspnea, unspecified type    Hypoxia    COPD exacerbation (HCC)    Community acquired pneumonia, unspecified laterality    Acute on chronic congestive heart failure, unspecified heart failure type (HCC)    COVID    Shortness of breath    BPH NOS w ur obs/LUTS    Benign prostatic hyperplasia, unspecified whether lower urinary tract symptoms present    BPH with obstruction/lower urinary tract symptoms       Reason for Admission: post operative CBI    Physical Exam: see progress note dated 10/31/2024    History of Present Illness: 89 year old male with 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 The patient was counseled on options, risks, and benefits and elected to undergo the above procedure. We discussed risks including, but not limited to, bleeding, infection, damage to surrounding structures, need for repeat procedure, urinary retention, erectile dysfunction, retrograde ejaculation, and urinary incontinence. The patient understood these risks and wished to proceed with surgery.       Hospital Course: After informed consent was obtained for cystoscopy, transurethral resection of prostate, the patient was brought to the OR where aforementioned procedure was completed without any intraoperative complication. Patient was transferred from PACU to the floor without  event. Patient progressed as expected on the floor. CBI has been weaned and clarity remains satisfactory for discharge. Patient requested voiding trial prior to discharge and failed, hollins replaced. Patient is tolerating advancement of diet, pain is well controlled on oral pain medications, and patient is ambulatory. Patient will follow up as scheduled for hollins removal. Restrictions and post op instructions were reviewed.     Consultations: hospitalist    Procedures: cystoscopy, urethral dilation, transurethral resection of prostate    Complications: none    Discharge Condition: Stable         Discharge Medications:      Discharge Medications        START taking these medications        Instructions Prescription details   Polyethylene Glycol 3350 17 g Pack  Commonly known as: MiraLax      Take 17 g by mouth daily as needed (Take to avoid constipation, especially if taking narcotic pain medications.).   Quantity: 20 packet  Refills: 1     sulfamethoxazole-trimethoprim -160 MG Tabs per tablet  Commonly known as: Bactrim DS      Take 1 tablet by mouth 2 (two) times daily for 3 days.   Stop taking on: November 2, 2024  Quantity: 6 tablet  Refills: 0            CONTINUE taking these medications        Instructions Prescription details   albuterol (2.5 MG/3ML) 0.083% Nebu  Commonly known as: Ventolin      Take 3 mL (2.5 mg total) by nebulization every 6 (six) hours as needed for Wheezing.   Refills: 0     albuterol 108 (90 Base) MCG/ACT Aers  Commonly known as: Ventolin HFA      Inhale 1 puff into the lungs every 6 (six) hours as needed for Wheezing.   Refills: 0     LENNOX-SELTZER HEARTBURN OR      Take 1 tablet by mouth daily as needed.   Refills: 0     aspirin 81 MG Tbec      Take 1 tablet (81 mg total) by mouth at bedtime.   Refills: 0     AZO TABS OR      Take 2 tablets by mouth daily as needed.   Refills: 0     BENADRYL OR      Take 1 tablet by mouth daily as needed.   Refills: 0     donepezil 10 MG  Tabs  Commonly known as: Aricept      Take 1 tablet (10 mg total) by mouth nightly.   Refills: 0     doxazosin 8 MG Tabs  Commonly known as: Cardura      Take 1 tablet (8 mg total) by mouth nightly. For Prostate   Refills: 0     Doxycycline Hyclate 100 MG Tabs  Commonly known as: VIBRAMYCIN      Take 1 tablet (100 mg total) by mouth 2 (two) times daily.   Stop taking on: October 31, 2024  Refills: 0     finasteride 5 MG Tabs  Commonly known as: Proscar      Take 1 tablet (5 mg total) by mouth nightly.   Refills: 0     fluticasone propionate 50 MCG/ACT Susp  Commonly known as: Flonase      2 sprays by Nasal route daily.   Refills: 0     fluticasone-salmeterol 250-50 MCG/ACT Aepb  Commonly known as: Advair Diskus      Inhale 1 puff into the lungs 2 (two) times daily.   Refills: 0     furosemide 20 MG Tabs  Commonly known as: Lasix      Take 1 tablet (20 mg total) by mouth daily.   Refills: 0     GAS-X OR      Take 1 tablet by mouth daily as needed.   Refills: 0     HYDROcodone-acetaminophen 5-325 MG Tabs  Commonly known as: Norco      Take 1 tablet by mouth every 6 (six) hours as needed for Pain.   Refills: 0     ipratropium-albuterol 0.5-2.5 (3) MG/3ML Soln  Commonly known as: Duoneb      Take 3 mL by nebulization every 6 (six) hours as needed.   Refills: 0     latanoprost 0.005 % Soln  Commonly known as: Xalatan      Place 1 drop into both eyes nightly.   Refills: 0     MUCINEX OR      Take 1 tablet by mouth daily.   Refills: 0     predniSONE 10 MG Tabs  Commonly known as: Deltasone      Take 1 tablet (10 mg total) by mouth daily.   Refills: 0     tolterodine 2 MG Tabs  Commonly known as: Detrol      Take 1 tablet (2 mg total) by mouth 2 (two) times daily.   Refills: 0               Where to Get Your Medications        These medications were sent to [id number] - [place], [place] - [address] AT [facility] [address], [phone], [phone]  [address] [place] [address]      Phone:  [phone]   Polyethylene Glycol 3350 17 g Pack  sulfamethoxazole-trimethoprim -160 MG Tabs per tablet         Follow up Visits:   Future Appointments   Date Time Provider Department [place]   11/26/2024 11:15 AM Alok Sims MD [id number]     Other Discharge Instructions:   You had a transurethral resection of the prostate (TURP) in the operating room today.    Instructions:    - No heavy lifting or strenuous activity for 4 weeks. You should continue walking and light activity daily.    - Make sure to avoid constipation. I prescribed Miralax for you to take daily at first. You can stop this if you start having loose stools or diarrhea, but the goal is for 1-2 soft bowel movements without straining, every day for the next 4 weeks.    - Your follow-up appointment is listed below. Please contact us at [phone] if you need to change your appointment.    Your appointments       Date & Time Appointment Department ([place])    Nov 26, 2024 11:15 AM CST Post Op Visit with Alok Sims MD [facility] ( [place] [facility])              [facility]  [address]  [facility] [address]  [facility] [address] [phone]            - You may experience mild pain after the procedure for a few days.  If the pain becomes intolerable please contact our office or go to the nearest Emergency Room or Urgent Care. You should take over the counter tylenol for mild pain, and can alternate with ibuprofen (AKA motrin, Advil - provided you do not have a medical condition such as stomach ulcers or kidney disease which prohibits you from taking these) if pain persists. If pain is still not relieved by tylenol and/or ibuprofen, you may take narcotic pain medication if prescribed (typically oxycodone or tramadol). If you are taking narcotic pain medication this can make you constipated, so you should take over the counter stool  softeners or miralax if prescribed.    - Warm pack or hot baths often help with discomfort after cystoscopy.    - If you take blood thinners (such as aspirin or plavix) please hold these medications until 7 days after surgery.     - You may experience burning and frequency of urination over the next few days. This will improve after a few days if you stay well hydrated.      - You are likely to see some blood in your urine (pink or light red urine) that should clear up within a few days. Staying well hydrated should help this clear up. If you notice the urine stays dark red or there are multiple large blood clots despite good hydration, please call the urology clinic ([phone]).     - Try to abstain from alcohol, coffee, tea, artificial sweeteners, and spicy food for the next 48 hours as these can irritate the bladder.    - Drink 1.5 to 2 liters of fluid today (water is preferable). If you are on a fluid restriction due to other medical reasons then you need to adhere to your fluid restriction recommendations.    - If you are discharged with a Fenton catheter in place, you will be scheduled to return for removal. If you were prescribed Oxybutynin on discharge (to help with bladder spasms), you should stop taking this 24 hours before your follow-up appointment. If the Fenton catheter stops draining, reposition yourself and check for kinks in the tubing. If this persists and you are not seeing any urine drain, and you notice bladder discomfort please call or go to the nearest emergency department as soon as possible.     - If you develop fevers / chills, difficulty urinating, or severe abdominal pain please call the office or go to the nearest emergency department.        Shira Cody PA-C  [facility]  10/31/2024

## 2024-10-31 NOTE — PROGRESS NOTES
[facility]   part of [facility]     Hospitalist Progress Note     Tung Maria Patient Status:  Outpatient in a Bed    7/15/1935 MRN [id number]   Location [facility] 3NW-A Attending Alok Sims MD   Hosp Day # 0 PCP Shima Tobar MD     Chief Complaint: hematuria     Subjective:     Patient states he is not wanting to go home with hollins.   No CP/SOB (worse from baseline), + chronic cough    Objective:    Review of Systems:   A comprehensive review of systems was completed; pertinent positive and negatives stated in subjective.    Vital signs:  Temp:  [97.3 °F (36.3 °C)-98 °F (36.7 °C)] 98 °F (36.7 °C)  Pulse:  [77-88] 78  Resp:  [16-21] 16  BP: (109-142)/(41-86) 133/41  SpO2:  [93 %-100 %] 99 %    Physical Exam:    General: No acute distress  Respiratory: No wheezes, no rhonchi  Cardiovascular: S1, S2, regular rate and rhythm  Abdomen: Soft, Non-tender, non-distended, positive bowel sounds  Neuro: No new focal deficits.   Extremities: No edema      Diagnostic Data:    Labs:  Recent Labs   Lab 10/31/24  0554   WBC 14.3*   HGB 11.3*   MCV 97.8   .0       Recent Labs   Lab 10/31/24  0554   GLU 97   BUN 14   CREATSERUM 0.76   CA 8.4*      K 3.6      CO2 34.0*       Estimated Creatinine Clearance: 62.8 mL/min (based on SCr of 0.76 mg/dL).    No results for input(s): \"TROP\", \"TROPHS\", \"CK\" in the last 168 hours.    No results for input(s): \"PTP\", \"INR\" in the last 168 hours.               Microbiology    No results found for this visit on 10/30/24.      Imaging: Reviewed in Epic.    Medications:    finasteride  5 mg Oral Nightly    acetaminophen  1,000 mg Oral Q8H DANNIE    polyethylene glycol (PEG 3350)  17 g Oral Daily    fluticasone-salmeterol  1 puff Inhalation BID    latanoprost  1 drop Both Eyes Nightly    donepezil  10 mg Oral Daily       Assessment & Plan:      #Bladder outlet obstruction due to BPH  -S/p TURP  -Management per urology     #COPD  -Home inhalers  - steroid    - doxy      # AAA  # chronic HFrEF, compensated   # # PreDM 2  # HL    DC planning.          Alicia Gilbert MD    Supplementary Documentation:     Quality:  DVT Mechanical Prophylaxis:   SCDs, Early ambuation  DVT Pharmacologic Prophylaxis   Medication   None                Code Status: Not on file  Fenton: Fenton catheter in place  Fenton Duration (in days): 2  Central line:    SUNG: 10/31/2024    Discharge is dependent on: course  At this point Mr. Maria is expected to be discharge to: home    The 21st Century Cures Act makes medical notes like these available to patients in the interest of transparency. Please be advised this is a medical document. Medical documents are intended to carry relevant information, facts as evident, and the clinical opinion of the practitioner. The medical note is intended as peer to peer communication and may appear blunt or direct. It is written in medical language and may contain abbreviations or verbiage that are unfamiliar.

## 2024-10-31 NOTE — PROGRESS NOTES
Patient A&Ox4, 4L NC at baseline, up w/ SB assist CBI intact, going moderately, light pink colored. IVF infusing per mar. Tolerating diet. Reporting penis pain and back pain. Receiving norco prn. Plan of care discussed w/ patient and family at bedside.

## 2024-11-01 ENCOUNTER — TELEPHONE (OUTPATIENT)
Dept: SURGERY | Facility: CLINIC | Age: 89
End: 2024-11-01

## 2024-11-01 NOTE — TELEPHONE ENCOUNTER
Patients spouse calling to request hospital follow up within 3 days. Patient has hollins catheter and was discharged on 10/31. Please call at [phone],thanks.

## 2024-11-04 ENCOUNTER — NURSE ONLY (OUTPATIENT)
Dept: SURGERY | Facility: CLINIC | Age: 89
End: 2024-11-04

## 2024-11-04 ENCOUNTER — TELEPHONE (OUTPATIENT)
Dept: PULMONOLOGY | Age: 89
End: 2024-11-04

## 2024-11-04 DIAGNOSIS — Z98.890 STATUS POST SURGERY: Primary | ICD-10-CM

## 2024-11-04 NOTE — TELEPHONE ENCOUNTER
Attempted to call, no answer. OK to add today to nurse schedule this morning for hollins removal/VT.

## 2024-11-04 NOTE — PROGRESS NOTES
Received patient for voiding trial and Fenton removal.     Patient here with wife who is on automated wheelchair. Patient oxygen depended. Labored breathing noted. Steps of the procedure explained. He verbalized understanding and agreeable to plans. Patient lowered his pants and undergarments, and laid down on a comfortable position. Genital area observed to clean and dry. Donned gloves. Chux on patient's thigh. Untangled the straps. Urine on the bag is yellow and no clots observed. Administered 200 ml of sterile water. Then with the empty syringe, deflated the balloon of his entire content of 10cc. Removed 16 Fr Fenton along with his leg bag. Tolerated the removal of Fenton. Chux and syringes were disposed accordingly.    Patient emptied bladder on a urinal. Pink blood in urine noted. Obtained 180ml. PVR shows 0 ml.    All questions answered and he agreed to plans.

## 2024-11-11 NOTE — PROGRESS NOTES
TURP pathology shows 16 g of resected tissue, Jose M 3+4 prostate cancer in 6% of the tissue.    Future Appointments  11/26/2024 11:15 AM   Alok Sims MD           [id number]            [facility]

## 2024-11-12 ENCOUNTER — TELEPHONE (OUTPATIENT)
Dept: SURGERY | Facility: CLINIC | Age: 89
End: 2024-11-12

## 2024-11-12 NOTE — TELEPHONE ENCOUNTER
This encounter is now closed.     RN called patient.  S/P TURP on 10/30 with Dr Sims  Reports little blood in urine and frequency  Has good BM  Denies strenuous activity. Not lifting heavy    RN encouraged to monitor fluid intake  Take time to urinate or empty bladder  Double void if needed  Do not hold urination  Use pads/ adult pull ups radha when in public  Tinge blood in urine expected  Avoid lifting  and strenuous activity    He agreed to plans. Will call office for any concerns.

## 2024-11-12 NOTE — TELEPHONE ENCOUNTER
Pt called stating pt had surgery two weeks ago.  Pt is not totally emptying the bladder and a little blood in urine.  Is there something pt can take.  Please call pt

## 2024-11-13 ENCOUNTER — TELEPHONE (OUTPATIENT)
Dept: UROLOGY | Age: 89
End: 2024-11-13

## 2024-11-26 ENCOUNTER — OFFICE VISIT (OUTPATIENT)
Dept: SURGERY | Facility: CLINIC | Age: 89
End: 2024-11-26

## 2024-11-26 DIAGNOSIS — J44.1 CHRONIC OBSTRUCTIVE PULMONARY DISEASE WITH (ACUTE) EXACERBATION (HCC): ICD-10-CM

## 2024-11-26 DIAGNOSIS — H35.3211 WET AGE-RELATED MACULAR DEGENERATION OF RIGHT EYE WITH ACTIVE CHOROIDAL NEOVASCULARIZATION (HCC): ICD-10-CM

## 2024-11-26 DIAGNOSIS — C61 PROSTATE CANCER (HCC): Primary | ICD-10-CM

## 2024-11-26 DIAGNOSIS — I50.22 CHRONIC SYSTOLIC HEART FAILURE (HCC): ICD-10-CM

## 2024-11-26 DIAGNOSIS — I71.41 PARARENAL ABDOMINAL AORTIC ANEURYSM (AAA) WITHOUT RUPTURE (HCC): ICD-10-CM

## 2024-11-26 DIAGNOSIS — N13.8 BPH WITH OBSTRUCTION/LOWER URINARY TRACT SYMPTOMS: ICD-10-CM

## 2024-11-26 DIAGNOSIS — N40.1 BPH WITH OBSTRUCTION/LOWER URINARY TRACT SYMPTOMS: ICD-10-CM

## 2024-11-26 LAB
APPEARANCE: CLEAR
BILIRUBIN: NEGATIVE
GLUCOSE (URINE DIPSTICK): NEGATIVE MG/DL
MULTISTIX LOT#: ABNORMAL NUMERIC
NITRITE, URINE: NEGATIVE
PH, URINE: 5.5 (ref 4.5–8)
PROTEIN (URINE DIPSTICK): 100 MG/DL
SPECIFIC GRAVITY: 1.02 (ref 1–1.03)
URINE-COLOR: YELLOW
UROBILINOGEN,SEMI-QN: 0.2 MG/DL (ref 0–1.9)

## 2024-11-26 PROCEDURE — 81003 URINALYSIS AUTO W/O SCOPE: CPT | Performed by: SURGERY

## 2024-11-26 PROCEDURE — 99024 POSTOP FOLLOW-UP VISIT: CPT | Performed by: SURGERY

## 2024-11-26 NOTE — PROGRESS NOTES
Urology Clinic Note    Primary Care Provider:  Shima Tobar MD     Chief Complaint:   BPH, prostate cancer     HPI:   Tung Maria is a 89 year old male with history of COPD on home oxygen at night, smoking, AAA referred for BPH/LUTS.  He is currently on doxazosin 1 mg nightly, tolterodine, and finasteride.     He is followed by Dr. Boston ([facility]).  Recently on 6/3/2024 he performed cystoscopy that showed a moderately trabeculated bladder with small diverticuli.  Bladder outlet appeared obstructed by significant lateral lobe hypertrophy.  Transrectal ultrasound measured a 30 cc prostate.  PVR 27 mL.  UroCuff testing showed stuttering low flow with high pressure obstructive voiding pattern.  Maximum flow 9.9 mL/s.  He elected to proceed with UroLift.     He continues to have bothersome weak urinary stream, urgency, frequency despite above medications.  I again discussed options of various surgical treatments.  He has severe COPD and is on home oxygen.  I discussed that a less invasive procedure such as UroLift could help with symptoms.  He is more interested in undergoing TURP in the operating room under general versus spinal.  I discussed that TURP would likely make his obstructive LUTS better but may not improve his overactive symptoms.     I brought him to the OR on 10/30/2024 for TURP, urethral dilation.  Discharged on postoperative day 1, Fenton catheter was replaced after failing void trial.  Fenton removed 11/4/2024.    TURP pathology shows 16 g of resected tissue, [place] 3+4 prostate cancer in 6% of the tissue.     He feels his urine stream is improved, but still intermittent at times.    Postvoid residual bladder scan: 0 mL    PSA:  No results found for: \"PSA\", \"PERCENTPSA\", \"PSAS\", \"PSAULTRA\", \"QPSA\", \"PSATOT\", \"TOTPSADX\", \"TOTPSASCREEN\"     History:     Past Medical History:    AAA (abdominal aortic aneurysm) (HCC)    Asthma (HCC)    Back problem    BPH (benign prostatic hyperplasia)    COPD  (chronic obstructive pulmonary disease) (HCC)    2L O2 prn    Hearing impaired person, bilateral    not wearing hearing aids    Osteoarthritis    Problems with swallowing    Tobacco use    Visual impairment    glasses       Past Surgical History:   Procedure Laterality Date    Arthroscopy of joint unlisted Left     Cystoscopy,insert ureteral stent      Hernia repair         No family history on file.    Social History     Socioeconomic History    Marital status:    Tobacco Use    Smoking status: Every Day     Current packs/day: 1.00     Average packs/day: 1 pack/day for 60.0 years (60.0 ttl pk-yrs)     Types: Cigarettes    Smokeless tobacco: Never    Tobacco comments:     60 PACK YEARS- 0.5 daily     Pt trying to quit smoking as of 9/20/2024   Vaping Use    Vaping status: Never Used   Substance and Sexual Activity    Alcohol use: Not Currently     Comment: rare    Drug use: No     Social Drivers of Health     Food Insecurity: No Food Insecurity (10/30/2024)    Food Insecurity     Food Insecurity: Never true   Transportation Needs: No Transportation Needs (10/30/2024)    Transportation Needs     Lack of Transportation: No    Received from [facility]    Social Connections   Housing Stability: Low Risk  (10/30/2024)    Housing Stability     Housing Instability: No       Medications (Active prior to today's visit):  Current Outpatient Medications   Medication Sig Dispense Refill    fluticasone-salmeterol 250-50 MCG/ACT Inhalation Aerosol Powder, Breath Activated Inhale 1 puff into the lungs 2 (two) times daily.      Phenazopyridine HCl (AZO TABS OR) Take 2 tablets by mouth daily as needed.      Calcium Carbonate Antacid (LENNOX-SELTZER HEARTBURN OR) Take 1 tablet by mouth daily as needed.      diphenhydrAMINE HCl (BENADRYL OR) Take 1 tablet by mouth daily as needed.      Simethicone (GAS-X OR) Take 1 tablet by mouth daily as needed.      guaiFENesin (MUCINEX OR) Take 1 tablet by mouth daily.       ipratropium-albuterol 0.5-2.5 (3) MG/3ML Inhalation Solution Take 3 mL by nebulization every 6 (six) hours as needed.      Polyethylene Glycol 3350 (MIRALAX) 17 g Oral Powd Pack Take 17 g by mouth daily as needed (Take to avoid constipation, especially if taking narcotic pain medications.). 20 packet 1    albuterol 108 (90 Base) MCG/ACT Inhalation Aero Soln Inhale 1 puff into the lungs every 6 (six) hours as needed for Wheezing.      donepezil 10 MG Oral Tab Take 1 tablet (10 mg total) by mouth nightly.      HYDROcodone-acetaminophen 5-325 MG Oral Tab Take 1 tablet by mouth every 6 (six) hours as needed for Pain.      predniSONE 10 MG Oral Tab Take 1 tablet (10 mg total) by mouth daily.      fluticasone propionate 50 MCG/ACT Nasal Suspension 2 sprays by Nasal route daily.      albuterol (2.5 MG/3ML) 0.083% Inhalation Nebu Soln Take 3 mL (2.5 mg total) by nebulization every 6 (six) hours as needed for Wheezing.      latanoprost 0.005 % Ophthalmic Solution Place 1 drop into both eyes nightly.      furosemide 20 MG Oral Tab Take 1 tablet (20 mg total) by mouth daily.  0    aspirin 81 MG Oral Tab EC Take 1 tablet (81 mg total) by mouth at bedtime.      doxazosin 8 MG Oral Tab Take 1 tablet (8 mg total) by mouth nightly. For Prostate      tolterodine 2 MG Oral Tab Take 1 tablet (2 mg total) by mouth 2 (two) times daily.      finasteride 5 MG Oral Tab Take 1 tablet (5 mg total) by mouth nightly.         Allergies:  Allergies[1]    Review of Systems:   A comprehensive 10-point review of systems was completed.  Pertinent positives and negatives are noted in the the HPI.    Physical Exam:   CONSTITUTIONAL: Well developed, well nourished, in no acute distress  NEUROLOGIC: Alert and oriented  HEAD: Normocephalic, atraumatic  EYES: Sclera non-icteric  ENT: Hearing intact, moist mucous membranes  NECK: No obvious goiter or masses  RESPIRATORY: Normal respiratory effort  SKIN: No evident rashes  ABDOMEN: Soft, non-tender,  non-distended    Assessment & Plan:   Tung Maria is a 89 year old male with history of COPD on home oxygen at night, smoking, AAA referred for BPH/LUTS.  He is currently on doxazosin 1 mg nightly, tolterodine, and finasteride.     He is followed by Dr. Boston ([facility]).  Recently on 6/3/2024 he performed cystoscopy that showed a moderately trabeculated bladder with small diverticuli.  Bladder outlet appeared obstructed by significant lateral lobe hypertrophy.  Transrectal ultrasound measured a 30 cc prostate.  PVR 27 mL.  UroCuff testing showed stuttering low flow with high pressure obstructive voiding pattern.  Maximum flow 9.9 mL/s.  He elected to proceed with UroLift.     He continues to have bothersome weak urinary stream, urgency, frequency despite above medications.  I again discussed options of various surgical treatments.  He has severe COPD and is on home oxygen.  I discussed that a less invasive procedure such as UroLift could help with symptoms.  He is more interested in undergoing TURP in the operating room under general versus spinal.  I discussed that TURP would likely make his obstructive LUTS better but may not improve his overactive symptoms.     I brought him to the OR on 10/30/2024 for TURP, urethral dilation.  Discharged on postoperative day 1, Fenton catheter was replaced after failing void trial.  Fenton removed 11/4/2024.    TURP pathology shows 16 g of resected tissue, [place] 3+4 prostate cancer in 6% of the tissue.     He feels his urine stream is improved, but still intermittent at times.    -Return to clinic in 1 month with PSA prior  -Likely no intervention indicated for low amount of [place] 3+4 prostate cancer PSA is low given age and comorbidities  -Continue doxazosin, tolterodine, finasteride for now  -Can consider changing alpha-blocker dose or formulation next visit if intermittency persists    In total, 30 minutes were spent on this patient encounter (including chart  review, patient history, physical, and counseling, documentation, and communication).    Alok Sims MD  Staff Urologist  [facility]  Office: [phone]             [1]   Allergies  Allergen Reactions    Ciprofloxacin OTHER (SEE COMMENTS)     Heel pain    Ibuprofen ARRHYTHMIA    Methocarbamol OTHER (SEE COMMENTS)     Pt states causes headache       Flexeril [Cyclobenzaprine Hcl] PAIN     Pt states causes Headache    Strawberry NAUSEA AND VOMITING    Mg Hydroxide-Quinapril Hcl OTHER (SEE COMMENTS)     High blood pressure and HA    Pcn [Penicillins] RASH

## 2024-12-02 ENCOUNTER — TELEPHONE (OUTPATIENT)
Dept: SURGERY | Facility: CLINIC | Age: 89
End: 2024-12-02

## 2024-12-02 NOTE — TELEPHONE ENCOUNTER
Patient wife calling for blood work order to be fax to [facility]     Fax [phone]  Please update patient wife

## 2024-12-06 ENCOUNTER — TELEPHONE (OUTPATIENT)
Dept: SURGERY | Facility: CLINIC | Age: 89
End: 2024-12-06

## 2024-12-06 RX ORDER — TAMSULOSIN HYDROCHLORIDE 0.4 MG/1
0.8 CAPSULE ORAL EVERY EVENING
Qty: 180 CAPSULE | Refills: 3 | Status: SHIPPED | OUTPATIENT
Start: 2024-12-06

## 2024-12-06 NOTE — TELEPHONE ENCOUNTER
Per patient having problems and is requesting a call back from nurse regarding medication. Would not disclose any other information. Please advise.

## 2024-12-06 NOTE — TELEPHONE ENCOUNTER
Pt c/o ongoing intermittent stream which feels like he is incompletely emptying. Per MB last notes, can switch alpha blocker. Will change to flomax 0.8mg daily. Recheck at next OV for PVR and sx.

## 2024-12-10 ENCOUNTER — TELEPHONE (OUTPATIENT)
Dept: PULMONOLOGY | Age: 89
End: 2024-12-10

## 2025-01-02 ENCOUNTER — OFFICE VISIT (OUTPATIENT)
Dept: SURGERY | Facility: CLINIC | Age: 89
End: 2025-01-02

## 2025-01-02 DIAGNOSIS — N13.8 BPH WITH OBSTRUCTION/LOWER URINARY TRACT SYMPTOMS: ICD-10-CM

## 2025-01-02 DIAGNOSIS — N40.1 BPH WITH OBSTRUCTION/LOWER URINARY TRACT SYMPTOMS: ICD-10-CM

## 2025-01-02 DIAGNOSIS — N32.81 OAB (OVERACTIVE BLADDER): ICD-10-CM

## 2025-01-02 DIAGNOSIS — C61 PROSTATE CANCER (HCC): Primary | ICD-10-CM

## 2025-01-02 LAB
APPEARANCE: CLEAR
BILIRUBIN: NEGATIVE
GLUCOSE (URINE DIPSTICK): NEGATIVE MG/DL
KETONES (URINE DIPSTICK): NEGATIVE MG/DL
MULTISTIX LOT#: ABNORMAL NUMERIC
NITRITE, URINE: NEGATIVE
PH, URINE: 5.5 (ref 4.5–8)
PROTEIN (URINE DIPSTICK): 30 MG/DL
SPECIFIC GRAVITY: 1.02 (ref 1–1.03)
URINE-COLOR: YELLOW
UROBILINOGEN,SEMI-QN: 0.2 MG/DL (ref 0–1.9)

## 2025-01-02 PROCEDURE — 81003 URINALYSIS AUTO W/O SCOPE: CPT | Performed by: SURGERY

## 2025-01-02 PROCEDURE — 99024 POSTOP FOLLOW-UP VISIT: CPT | Performed by: SURGERY

## 2025-01-02 NOTE — PROGRESS NOTES
Urology Clinic Note    Primary Care Provider:  Shima Tobar MD     Chief Complaint:   BPH, prostate cancer     HPI:   Tung Maria is a 89 year old male with history of COPD on home oxygen at night, smoking, AAA referred for BPH/LUTS.  He is currently on doxazosin 1 mg nightly, tolterodine, and finasteride.     He is followed by Dr. Boston ([facility]).  Recently on 6/3/2024 he performed cystoscopy that showed a moderately trabeculated bladder with small diverticuli.  Bladder outlet appeared obstructed by significant lateral lobe hypertrophy.  Transrectal ultrasound measured a 30 cc prostate.  PVR 27 mL.  UroCuff testing showed stuttering low flow with high pressure obstructive voiding pattern.  Maximum flow 9.9 mL/s.  He elected to proceed with UroLift.     He continues to have bothersome weak urinary stream, urgency, frequency despite above medications.  I again discussed options of various surgical treatments.  He has severe COPD and is on home oxygen.  I discussed that a less invasive procedure such as UroLift could help with symptoms.  He is more interested in undergoing TURP in the operating room under general versus spinal.  I discussed that TURP would likely make his obstructive LUTS better but may not improve his overactive symptoms.     I brought him to the OR on 10/30/2024 for TURP, urethral dilation.  Discharged on postoperative day 1, Fenton catheter was replaced after failing void trial.  Fenton removed 11/4/2024.     TURP pathology shows 16 g of resected tissue, [place] 3+4 prostate cancer in 6% of the tissue.  PSA on 12/14/2024 ([facility]) was 0.88.  Discussed likely watchful waiting for prostate cancer given age and comorbidities.    He endorses continued intermittent urinary stream, doxazosin switched to tamsulosin.  Tolterodine and finasteride continued.    He continues to endorse intermittent urinary stream and urinary urgency/urge incontinence.  Slight improvement since switching to  tamsulosin but still very bothered.    Urinalysis: Large blood, moderate LE    Plan:   -Return to clinic for cystoscopy to evaluate for urethral stricture or bladder neck contracture  -Follow-up urine culture  -PVR after cystoscopy  -PSA in 6 months       PSA:  No results found for: \"PSA\", \"PERCENTPSA\", \"PSAS\", \"PSAULTRA\", \"QPSA\", \"PSATOT\", \"TOTPSADX\", \"TOTPSASCREEN\"     History:     Past Medical History:    AAA (abdominal aortic aneurysm) ([facility])    Asthma (HCC)    Back problem    BPH (benign prostatic hyperplasia)    COPD (chronic obstructive pulmonary disease) ([facility])    2L O2 prn    Hearing impaired person, bilateral    not wearing hearing aids    Osteoarthritis    Problems with swallowing    Tobacco use    Visual impairment    glasses       Past Surgical History:   Procedure Laterality Date    Arthroscopy of joint unlisted Left     Cystoscopy,insert ureteral stent      Hernia repair         No family history on file.    Social History     Socioeconomic History    Marital status:    Tobacco Use    Smoking status: Every Day     Current packs/day: 1.00     Average packs/day: 1 pack/day for 60.0 years (60.0 ttl pk-yrs)     Types: Cigarettes    Smokeless tobacco: Never    Tobacco comments:     60 PACK YEARS- 0.5 daily     Pt trying to quit smoking as of 9/20/2024   Vaping Use    Vaping status: Never Used   Substance and Sexual Activity    Alcohol use: Not Currently     Comment: rare    Drug use: No     Social Drivers of Health     Food Insecurity: No Food Insecurity (10/30/2024)    Food Insecurity     Food Insecurity: Never true   Transportation Needs: No Transportation Needs (10/30/2024)    Transportation Needs     Lack of Transportation: No    Received from [facility]    Social Connections   Housing Stability: Low Risk  (10/30/2024)    Housing Stability     Housing Instability: No       Medications (Active prior to today's visit):  Current Outpatient Medications   Medication Sig Dispense  Refill    tamsulosin 0.4 MG Oral Cap Take 2 capsules (0.8 mg total) by mouth every evening. 180 capsule 3    fluticasone-salmeterol 250-50 MCG/ACT Inhalation Aerosol Powder, Breath Activated Inhale 1 puff into the lungs 2 (two) times daily.      Phenazopyridine HCl (AZO TABS OR) Take 2 tablets by mouth daily as needed.      Calcium Carbonate Antacid (LENNOX-SELTZER HEARTBURN OR) Take 1 tablet by mouth daily as needed.      diphenhydrAMINE HCl (BENADRYL OR) Take 1 tablet by mouth daily as needed.      Simethicone (GAS-X OR) Take 1 tablet by mouth daily as needed.      guaiFENesin (MUCINEX OR) Take 1 tablet by mouth daily.      ipratropium-albuterol 0.5-2.5 (3) MG/3ML Inhalation Solution Take 3 mL by nebulization every 6 (six) hours as needed.      Polyethylene Glycol 3350 (MIRALAX) 17 g Oral Powd Pack Take 17 g by mouth daily as needed (Take to avoid constipation, especially if taking narcotic pain medications.). 20 packet 1    albuterol 108 (90 Base) MCG/ACT Inhalation Aero Soln Inhale 1 puff into the lungs every 6 (six) hours as needed for Wheezing.      donepezil 10 MG Oral Tab Take 1 tablet (10 mg total) by mouth nightly.      HYDROcodone-acetaminophen 5-325 MG Oral Tab Take 1 tablet by mouth every 6 (six) hours as needed for Pain.      predniSONE 10 MG Oral Tab Take 1 tablet (10 mg total) by mouth daily.      fluticasone propionate 50 MCG/ACT Nasal Suspension 2 sprays by Nasal route daily.      albuterol (2.5 MG/3ML) 0.083% Inhalation Nebu Soln Take 3 mL (2.5 mg total) by nebulization every 6 (six) hours as needed for Wheezing.      latanoprost 0.005 % Ophthalmic Solution Place 1 drop into both eyes nightly.      furosemide 20 MG Oral Tab Take 1 tablet (20 mg total) by mouth daily.  0    aspirin 81 MG Oral Tab EC Take 1 tablet (81 mg total) by mouth at bedtime.      tolterodine 2 MG Oral Tab Take 1 tablet (2 mg total) by mouth 2 (two) times daily.      finasteride 5 MG Oral Tab Take 1 tablet (5 mg total) by mouth  nightly.         Allergies:  Allergies[1]    Review of Systems:   A comprehensive 10-point review of systems was completed.  Pertinent positives and negatives are noted in the the HPI.    Physical Exam:   CONSTITUTIONAL: Well developed, well nourished, in no acute distress  NEUROLOGIC: Alert and oriented  HEAD: Normocephalic, atraumatic  EYES: Sclera non-icteric  ENT: Hearing intact, moist mucous membranes  NECK: No obvious goiter or masses  RESPIRATORY: Normal respiratory effort  SKIN: No evident rashes  ABDOMEN: Soft, non-tender, non-distended      In total, 30 minutes were spent on this patient encounter (including chart review, patient history, physical, counseling, documentation, and communication).    Alok Sims MD  Staff Urologist  [facility]  Office: [phone]             [1]   Allergies  Allergen Reactions    Ciprofloxacin OTHER (SEE COMMENTS)     Heel pain    Ibuprofen ARRHYTHMIA and OTHER (SEE COMMENTS)    Methocarbamol OTHER (SEE COMMENTS)    Penicillins RASH, HIVES and UNKNOWN    Sulindac OTHER (SEE COMMENTS)     Hypertension- increase BP    Strawberry NAUSEA AND VOMITING    Cyclobenzaprine Hcl OTHER (SEE COMMENTS)    Mg Hydroxide-Quinapril Hcl OTHER (SEE COMMENTS)     High blood pressure and HA    Naproxen OTHER (SEE COMMENTS)

## 2025-01-06 NOTE — PROGRESS NOTES
Negative urine culture.    Soum message sent to patient.    Future Appointments  1/10/2025  9:30 AM    Alok Sims MD           [id number]           [facility]

## 2025-01-07 ENCOUNTER — APPOINTMENT (OUTPATIENT)
Dept: PULMONOLOGY | Age: 89
End: 2025-01-07

## 2025-01-07 VITALS
HEART RATE: 77 BPM | HEIGHT: 67 IN | WEIGHT: 182.32 LBS | BODY MASS INDEX: 28.62 KG/M2 | DIASTOLIC BLOOD PRESSURE: 60 MMHG | SYSTOLIC BLOOD PRESSURE: 130 MMHG | OXYGEN SATURATION: 99 %

## 2025-01-07 DIAGNOSIS — J43.8 OTHER EMPHYSEMA  (CMD): Primary | ICD-10-CM

## 2025-01-07 DIAGNOSIS — Z23 NEED FOR VACCINATION: ICD-10-CM

## 2025-01-07 PROCEDURE — G0008 ADMIN INFLUENZA VIRUS VAC: HCPCS | Performed by: INTERNAL MEDICINE

## 2025-01-07 PROCEDURE — 90662 IIV NO PRSV INCREASED AG IM: CPT | Performed by: INTERNAL MEDICINE

## 2025-01-07 PROCEDURE — 99214 OFFICE O/P EST MOD 30 MIN: CPT | Performed by: INTERNAL MEDICINE

## 2025-01-07 RX ORDER — TAMSULOSIN HYDROCHLORIDE 0.4 MG/1
0.4 CAPSULE ORAL
COMMUNITY
Start: 2024-12-06

## 2025-01-08 ENCOUNTER — TELEPHONE (OUTPATIENT)
Dept: PULMONOLOGY | Age: 89
End: 2025-01-08

## 2025-01-09 ENCOUNTER — TELEPHONE (OUTPATIENT)
Dept: CARDIOLOGY | Age: 89
End: 2025-01-09

## 2025-01-10 ENCOUNTER — PROCEDURE (OUTPATIENT)
Dept: SURGERY | Facility: CLINIC | Age: 89
End: 2025-01-10

## 2025-01-10 DIAGNOSIS — N13.8 BPH WITH OBSTRUCTION/LOWER URINARY TRACT SYMPTOMS: Primary | ICD-10-CM

## 2025-01-10 DIAGNOSIS — N40.1 BPH WITH OBSTRUCTION/LOWER URINARY TRACT SYMPTOMS: Primary | ICD-10-CM

## 2025-01-10 DIAGNOSIS — R82.90 URINE FINDING: ICD-10-CM

## 2025-01-10 LAB
APPEARANCE: CLEAR
BILIRUBIN: NEGATIVE
GLUCOSE (URINE DIPSTICK): NEGATIVE MG/DL
KETONES (URINE DIPSTICK): NEGATIVE MG/DL
MULTISTIX LOT#: ABNORMAL NUMERIC
NITRITE, URINE: NEGATIVE
PH, URINE: 7 (ref 4.5–8)
SPECIFIC GRAVITY: 1.02 (ref 1–1.03)
URINE-COLOR: YELLOW
UROBILINOGEN,SEMI-QN: 0.2 MG/DL (ref 0–1.9)

## 2025-01-10 PROCEDURE — 81003 URINALYSIS AUTO W/O SCOPE: CPT | Performed by: SURGERY

## 2025-01-10 PROCEDURE — 52000 CYSTOURETHROSCOPY: CPT | Performed by: SURGERY

## 2025-01-10 RX ORDER — MIRABEGRON 25 MG/1
25 TABLET, FILM COATED, EXTENDED RELEASE ORAL DAILY
Qty: 90 TABLET | Refills: 6 | Status: SHIPPED | OUTPATIENT
Start: 2025-01-10

## 2025-01-10 RX ORDER — SULFAMETHOXAZOLE AND TRIMETHOPRIM 800; 160 MG/1; MG/1
1 TABLET ORAL ONCE
Status: COMPLETED | OUTPATIENT
Start: 2025-01-10 | End: 2025-01-10

## 2025-01-10 RX ORDER — TAMSULOSIN HYDROCHLORIDE 0.4 MG/1
0.8 CAPSULE ORAL EVERY EVENING
Qty: 180 CAPSULE | Refills: 3 | Status: SHIPPED | OUTPATIENT
Start: 2025-01-10

## 2025-01-10 RX ADMIN — SULFAMETHOXAZOLE AND TRIMETHOPRIM 1 TABLET: 800; 160 TABLET ORAL at 10:00:00

## 2025-01-10 NOTE — PROCEDURES
Clinic Procedure Note    INDICATIONS:   Tung Maria is a 89 year old male with history of COPD on home oxygen at night, smoking, AAA referred for BPH/LUTS.  He is currently on doxazosin 1 mg nightly, tolterodine, and finasteride.     He is followed by Dr. Boston ([facility]).  Recently on 6/3/2024 he performed cystoscopy that showed a moderately trabeculated bladder with small diverticuli.  Bladder outlet appeared obstructed by significant lateral lobe hypertrophy.  Transrectal ultrasound measured a 30 cc prostate.  PVR 27 mL.  UroCuff testing showed stuttering low flow with high pressure obstructive voiding pattern.  Maximum flow 9.9 mL/s.  He elected to proceed with UroLift.     He continues to have bothersome weak urinary stream, urgency, frequency despite above medications.  I again discussed options of various surgical treatments.  He has severe COPD and is on home oxygen.  I discussed that a less invasive procedure such as UroLift could help with symptoms.  He is more interested in undergoing TURP in the operating room under general versus spinal.  I discussed that TURP would likely make his obstructive LUTS better but may not improve his overactive symptoms.     I brought him to the OR on 10/30/2024 for TURP, urethral dilation.  Discharged on postoperative day 1, Fenton catheter was replaced after failing void trial.  Fenton removed 11/4/2024.     TURP pathology shows 16 g of resected tissue, [place] 3+4 prostate cancer in 6% of the tissue.  PSA on 12/14/2024 ([facility]) was 0.88.  Discussed likely watchful waiting for prostate cancer given age and comorbidities.     He endorses continued intermittent urinary stream, doxazosin switched to tamsulosin.  Tolterodine and finasteride continued.     He continues to endorse intermittent urinary stream and urinary urgency/urge incontinence.  Slight improvement since switching to tamsulosin but still very bothered.    PROCEDURE:       1. Flexible  cystourethroscopy    DATE OF PROCEDURE: 1/10/2025     PRE-PROCEDURE DIAGNOSIS: Obstructive LUTS    POST-PROCEDURE DIAGNOSIS: Same     SURGEON: Alok Sims MD    FINDINGS:    Urethra: Orthotopic meatus, normal caliber urethra throughout without lesions    Prostate: Wide open TURP defect, still some mild healing tissue around the bladder neck but no signs of obstruction    Bladder: Normal mucosa with no papillary lesions or erythema, severe trabeculation    Ureteral orifices: Orthotopic    Other findings: None    PROCEDURE:   Patient was brought to the procedure suite and a time-out was performed identifiying the patient,  and procedure to be performed. The risks and benefits of the procedure were once again discussed with the patient including bleeding, infection, and dysuria. The patient agreed to proceed. The patient did not have any signs or symptoms of active UTI.    He was placed in supine position on the table and the penis was prepped and draped in the standard sterile fashion. Urojet was instilled per urethra for local anesthetic effect. A flexible cystoscope was inserted per urethra. The bladder was fully inspected (including retroflexion) and showed findings as above. Both ureteral orifices were orthotopic. The prostate was carefully viewed on removal of the scope, with findings as above. The scope was then carefully removed.    There were no complications and the patient tolerated the procedure well.    IMPRESSION:  Tung Maria is a 89 year old male with history of COPD on home oxygen at night, smoking, AAA referred for BPH/LUTS.  He is currently on doxazosin 1 mg nightly, tolterodine, and finasteride.     He is followed by Dr. Boston ([facility]).  Recently on 6/3/2024 he performed cystoscopy that showed a moderately trabeculated bladder with small diverticuli.  Bladder outlet appeared obstructed by significant lateral lobe hypertrophy.  Transrectal ultrasound measured a 30 cc prostate.  PVR 27  mL.  UroCuff testing showed stuttering low flow with high pressure obstructive voiding pattern.  Maximum flow 9.9 mL/s.  He elected to proceed with UroLift.     He continues to have bothersome weak urinary stream, urgency, frequency despite above medications.  I again discussed options of various surgical treatments.  He has severe COPD and is on home oxygen.  I discussed that a less invasive procedure such as UroLift could help with symptoms.  He is more interested in undergoing TURP in the operating room under general versus spinal.  I discussed that TURP would likely make his obstructive LUTS better but may not improve his overactive symptoms.     I brought him to the OR on 10/30/2024 for TURP, urethral dilation.  Discharged on postoperative day 1, Fenton catheter was replaced after failing void trial.  Fenton removed 11/4/2024.     TURP pathology shows 16 g of resected tissue, [place] 3+4 prostate cancer in 6% of the tissue.  PSA on 12/14/2024 ([facility]) was 0.88.  Discussed likely watchful waiting for prostate cancer given age and comorbidities.     He endorses continued intermittent urinary stream, doxazosin switched to tamsulosin.  Tolterodine and finasteride continued.     He continues to endorse intermittent urinary stream and urinary urgency/urge incontinence.  Slight improvement since switching to tamsulosin but still very bothered.    Cystoscopy today shows a wide open TURP defect with no signs of obstruction, severe bladder trabeculation.    PLAN:   -Continue tamsulosin 0.8 mg daily  -Stop finasteride  -Stop tolterodine  -Start mirabegron  -RTC 2-3 months      Alok Sims MD  Staff Urologist  [facility]  Office: [phone]

## 2025-01-14 ENCOUNTER — TELEPHONE (OUTPATIENT)
Dept: SURGERY | Facility: CLINIC | Age: 89
End: 2025-01-14

## 2025-01-14 NOTE — TELEPHONE ENCOUNTER
RN called patient regarding his Myrbetriq that is costly.  Patient accepted the offer for [facility]. Instructions /directions given regarding out of pocket cost. He agreed to 30 day supply for [facility]a for Myrbetriq 40mg.      Myrbetriq 40mg  30 capsules for $79.50 or 90 capsules for $214     Order faxed to [facility] for Myrbetriq 40 mg 30 day supply.

## 2025-01-27 ENCOUNTER — TELEPHONE (OUTPATIENT)
Dept: PULMONOLOGY | Age: 89
End: 2025-01-27

## 2025-01-31 ENCOUNTER — TELEPHONE (OUTPATIENT)
Dept: SURGERY | Facility: CLINIC | Age: 89
End: 2025-01-31

## 2025-01-31 NOTE — TELEPHONE ENCOUNTER
Pt's wife called.  Question on rx. Finasteride.  After visit summary to stop taking the rx.  Pt was not aware.  Please call

## 2025-02-04 NOTE — TELEPHONE ENCOUNTER
Per spouse asking for a confirmation that patient is supposed to discontinue finasteride. Please advise

## 2025-02-05 NOTE — TELEPHONE ENCOUNTER
This encounter is now closed.     RN called wife to clarify her concerns about the Finasteride medication.  Finasteride discontinued per Dr Sims's office notes on 1/10. Finasteride removed from the medication list.   Wife confirmed that patient is still on Myrbetriq 40mg from [facility]

## 2025-02-17 ENCOUNTER — TELEPHONE (OUTPATIENT)
Dept: SURGERY | Facility: CLINIC | Age: 89
End: 2025-02-17

## 2025-02-17 NOTE — TELEPHONE ENCOUNTER
Patient calling stating medication is not helping and would like to know if he should keep taking it?    Mirabegron ER 25 MG Oral Tablet 24 Hr

## 2025-02-17 NOTE — TELEPHONE ENCOUNTER
Rn called patient. He explained that he started taking Mirabegron on 1/22 and does not work. RN explained that \" Myrbetriq typically starts to show effectiveness within 4-8 weeks of consistent use. Some patients may notice improvements in their overactive bladder symptoms, such as reduced urgency, frequency, and incontinence, as early as 2 weeks, but full benefits often take longer. If there's no noticeable improvement after 8 weeks, it's a good idea to discuss alternative options with the doctor. \"    He prefers for an alternative medication. RN forwarding to MD to advise.    Cysto on 1/10 with Dr Sims   PLAN:   -Continue tamsulosin 0.8 mg daily  -Stop finasteride  -Stop tolterodine  -Start mirabegron  -RTC 2-3 months

## 2025-02-18 RX ORDER — MIRABEGRON 50 MG/1
50 TABLET, FILM COATED, EXTENDED RELEASE ORAL DAILY
Qty: 90 TABLET | Refills: 3 | Status: SHIPPED | OUTPATIENT
Start: 2025-02-18 | End: 2025-02-19

## 2025-02-18 NOTE — TELEPHONE ENCOUNTER
This encounter is now closed.     RN called patient. Relayed to patient PA's recommendation to increase Myrbetriq to 50mg instead of 25 mg. He agreed to plans and will follow up as scheduled.

## 2025-02-18 NOTE — TELEPHONE ENCOUNTER
OK to increase to 50mg for the next four weeks and follow up a scheduled.    Future Appointments   Date Time Provider Department Center   4/7/2025  1:30 PM Alok Sims MD [id number]  [facility]

## 2025-02-19 ENCOUNTER — TELEPHONE (OUTPATIENT)
Dept: SURGERY | Facility: CLINIC | Age: 89
End: 2025-02-19

## 2025-02-19 RX ORDER — MIRABEGRON 50 MG/1
50 TABLET, FILM COATED, EXTENDED RELEASE ORAL DAILY
Qty: 90 TABLET | Refills: 3 | Status: SHIPPED | OUTPATIENT
Start: 2025-02-19 | End: 2025-02-24

## 2025-02-19 NOTE — TELEPHONE ENCOUNTER
RN initiated the prior auth for Myrbetriq 50mg and sent it to [facility].

## 2025-02-19 NOTE — TELEPHONE ENCOUNTER
Patient's wife requesting the MYRBETRIQ 50 MG Oral Tablet prescription be sent to [facility] in [place] listed below. Please advise.       Current Outpatient Medications:     MYRBETRIQ 50 MG Oral Tablet 24 Hr, Take 1 tablet (50 mg total) by mouth daily., Disp: 90 tablet, Rfl: 3    [facility] #[id number] [place] [address] AT [facility] [address], [phone], [phone] [[id number]]

## 2025-02-19 NOTE — TELEPHONE ENCOUNTER
This encounter is now closed.     No PA needed since patient has no coverage under [facility]

## 2025-02-19 NOTE — TELEPHONE ENCOUNTER
Jenifer from [facility] received a prior authorization request for medication for Myrbetriq  but Jenifer stating patient has no insurance coverage active right now.Please advise

## 2025-02-19 NOTE — TELEPHONE ENCOUNTER
This encounter is now closed.     RN forwarded Myrbetriq to [facility] #[id number] - [place], [place] - [address] AT [facility] [address]  per wife's request:     Outpatient Medication Detail   Disp Refills Start End    MYRBETRIQ 50 MG Oral Tablet 24 Hr 90 tablet 3 2/18/2025 --    Sig - Route: Take 1 tablet (50 mg total) by mouth daily. - Oral    Sent to pharmacy as: Myrbetriq 50 MG Oral Tablet Extended Release 24 Hour    E-Prescribing Status: Receipt confirmed by pharmacy (2/18/2025  9:29 AM CST)      Pharmacy  [id number] - [place], [place] - [address] AT [facility] [address], [phone], [phone]

## 2025-02-21 ENCOUNTER — TELEPHONE (OUTPATIENT)
Dept: SURGERY | Facility: CLINIC | Age: 89
End: 2025-02-21

## 2025-02-24 RX ORDER — MIRABEGRON 50 MG/1
50 TABLET, FILM COATED, EXTENDED RELEASE ORAL DAILY
Qty: 90 TABLET | Refills: 3 | Status: SHIPPED | OUTPATIENT
Start: 2025-02-24

## 2025-02-24 NOTE — TELEPHONE ENCOUNTER
This encounter is now closed.     RN called patient. Medication at [place] not available. Wanting it at [facility] [address]  RN forwarded order there and to follow up.   He agreed to plans.

## 2025-03-03 ENCOUNTER — TELEPHONE (OUTPATIENT)
Dept: SURGERY | Facility: CLINIC | Age: 89
End: 2025-03-03

## 2025-03-03 NOTE — TELEPHONE ENCOUNTER
Per patient calling asking for a prescription of Mirobegron to be sent to \"[facility]\" at [phone]. Please advise.

## 2025-03-04 RX ORDER — MIRABEGRON 50 MG/1
50 TABLET, FILM COATED, EXTENDED RELEASE ORAL DAILY
Qty: 90 TABLET | Refills: 3 | Status: SHIPPED | OUTPATIENT
Start: 2025-03-04

## 2025-03-08 DIAGNOSIS — J44.9 CHRONIC OBSTRUCTIVE PULMONARY DISEASE, UNSPECIFIED COPD TYPE  (CMD): ICD-10-CM

## 2025-03-19 RX ORDER — PREDNISONE 10 MG/1
10 TABLET ORAL DAILY
Qty: 90 TABLET | Refills: 1 | Status: SHIPPED | OUTPATIENT
Start: 2025-03-19

## 2025-03-19 RX ORDER — FUROSEMIDE 20 MG/1
20 TABLET ORAL DAILY
Qty: 60 TABLET | Refills: 1 | Status: SHIPPED | OUTPATIENT
Start: 2025-03-19 | End: 2025-07-17

## 2025-03-20 ENCOUNTER — OFFICE VISIT (OUTPATIENT)
Dept: SURGERY | Facility: CLINIC | Age: 89
End: 2025-03-20

## 2025-03-20 DIAGNOSIS — R82.90 URINE FINDING: Primary | ICD-10-CM

## 2025-03-20 LAB
APPEARANCE: CLEAR
BILIRUBIN: NEGATIVE
GLUCOSE (URINE DIPSTICK): NEGATIVE MG/DL
KETONES (URINE DIPSTICK): NEGATIVE MG/DL
LEUKOCYTES: NEGATIVE
MULTISTIX LOT#: ABNORMAL NUMERIC
NITRITE, URINE: NEGATIVE
PH, URINE: 5.5 (ref 4.5–8)
PROTEIN (URINE DIPSTICK): NEGATIVE MG/DL
SPECIFIC GRAVITY: 1.02 (ref 1–1.03)
URINE-COLOR: YELLOW
UROBILINOGEN,SEMI-QN: 0.2 MG/DL (ref 0–1.9)

## 2025-03-20 PROCEDURE — 99214 OFFICE O/P EST MOD 30 MIN: CPT | Performed by: SURGERY

## 2025-03-20 PROCEDURE — 51798 US URINE CAPACITY MEASURE: CPT | Performed by: SURGERY

## 2025-03-20 PROCEDURE — 81002 URINALYSIS NONAUTO W/O SCOPE: CPT | Performed by: SURGERY

## 2025-03-20 PROCEDURE — G2211 COMPLEX E/M VISIT ADD ON: HCPCS | Performed by: SURGERY

## 2025-03-20 NOTE — PROGRESS NOTES
Urology Clinic Note    Primary Care Provider:  Shima Tobar MD     Chief Complaint:   BPH    HPI & Assessment:   Tung Maria is a 89 year old male with history of COPD on home oxygen at night, smoking, AAA referred for BPH/LUTS.  He is currently on doxazosin 1 mg nightly, tolterodine, and finasteride.     He is followed by Dr. Boston ([facility]).  Recently on 6/3/2024 he performed cystoscopy that showed a moderately trabeculated bladder with small diverticuli.  Bladder outlet appeared obstructed by significant lateral lobe hypertrophy.  Transrectal ultrasound measured a 30 cc prostate.  PVR 27 mL.  UroCuff testing showed stuttering low flow with high pressure obstructive voiding pattern.  Maximum flow 9.9 mL/s.  He elected to proceed with UroLift.     He continues to have bothersome weak urinary stream, urgency, frequency despite above medications.  I again discussed options of various surgical treatments.  He has severe COPD and is on home oxygen.  I discussed that a less invasive procedure such as UroLift could help with symptoms.  He is more interested in undergoing TURP in the operating room under general versus spinal.  I discussed that TURP would likely make his obstructive LUTS better but may not improve his overactive symptoms.     I brought him to the OR on 10/30/2024 for TURP, urethral dilation.  Discharged on postoperative day 1, Fenton catheter was replaced after failing void trial.  Fenton removed 11/4/2024.     TURP pathology shows 16 g of resected tissue, [place] 3+4 prostate cancer in 6% of the tissue.  PSA on 12/14/2024 ([facility]) was 0.88.  Discussed likely watchful waiting for prostate cancer given age and comorbidities.     He endorses continued intermittent urinary stream, doxazosin switched to tamsulosin.  Tolterodine and finasteride continued.     He continues to endorse intermittent urinary stream and urinary urgency/urge incontinence.  Slight improvement since switching to tamsulosin  but still very bothered.    Cystoscopy 1/10/2025 showed a wide open TURP defect with no signs of obstruction, severe bladder trabeculation.  I continued tamsulosin 0.8 mg daily, stopped tolterodine and finasteride, started mirabegron.    He had discounted mirabegron via [employer] but has only recently started it a few days ago.  He is also taking Lasix for leg swelling.  He endorses continued obstructive LUTS.  He has occasional urgency and urge incontinence urinary frequency is every 2-3 hours.     AUA symptom score is 25/6 with LUTS.    Post-void residual bladder scan: 4 mL    Urinalysis: Trace blood    Plan:   -Continue tamsulosin 0.8 mg daily  -Continue mirabegron, but give it a few more weeks to see some effect  -Return to clinic in 6 weeks  -May consider repeat cystoscopy if obstructive LUTS worsen as patient feels there is a blockage of the tip of his penis  -May consider CIC intermittently in the future       PSA:  No results found for: \"PSA\", \"PERCENTPSA\", \"PSAS\", \"PSAULTRA\", \"QPSA\", \"PSATOT\", \"TOTPSADX\", \"TOTPSASCREEN\"     History:     Past Medical History:    AAA (abdominal aortic aneurysm)    Asthma (HCC)    Back problem    BPH (benign prostatic hyperplasia)    COPD (chronic obstructive pulmonary disease) ([facility])    2L O2 prn    Hearing impaired person, bilateral    not wearing hearing aids    Osteoarthritis    Problems with swallowing    Tobacco use    Visual impairment    glasses       Past Surgical History:   Procedure Laterality Date    Arthroscopy of joint unlisted Left     Cystoscopy,insert ureteral stent      Hernia repair         History reviewed. No pertinent family history.    Social History     Socioeconomic History    Marital status:    Tobacco Use    Smoking status: Every Day     Current packs/day: 1.00     Average packs/day: 1 pack/day for 60.0 years (60.0 ttl pk-yrs)     Types: Cigarettes    Smokeless tobacco: Never    Tobacco comments:     60 PACK YEARS- 0.5 daily     Pt trying to quit  smoking as of 9/20/2024   Vaping Use    Vaping status: Never Used   Substance and Sexual Activity    Alcohol use: Not Currently     Comment: rare    Drug use: No     Social Drivers of Health     Food Insecurity: No Food Insecurity (10/30/2024)    Food Insecurity     Food Insecurity: Never true   Transportation Needs: No Transportation Needs (10/30/2024)    Transportation Needs     Lack of Transportation: No   Housing Stability: Low Risk  (10/30/2024)    Housing Stability     Housing Instability: No       Medications (Active prior to today's visit):  Current Outpatient Medications   Medication Sig Dispense Refill    MYRBETRIQ 50 MG Oral Tablet 24 Hr Take 1 tablet (50 mg total) by mouth daily. 90 tablet 3    tamsulosin 0.4 MG Oral Cap Take 2 capsules (0.8 mg total) by mouth every evening. 180 capsule 3    fluticasone-salmeterol 250-50 MCG/ACT Inhalation Aerosol Powder, Breath Activated Inhale 1 puff into the lungs 2 (two) times daily.      Phenazopyridine HCl (AZO TABS OR) Take 2 tablets by mouth daily as needed.      Calcium Carbonate Antacid (LENNOX-SELTZER HEARTBURN OR) Take 1 tablet by mouth daily as needed.      diphenhydrAMINE HCl (BENADRYL OR) Take 1 tablet by mouth daily as needed.      Simethicone (GAS-X OR) Take 1 tablet by mouth daily as needed.      guaiFENesin (MUCINEX OR) Take 1 tablet by mouth daily.      ipratropium-albuterol 0.5-2.5 (3) MG/3ML Inhalation Solution Take 3 mL by nebulization every 6 (six) hours as needed.      Polyethylene Glycol 3350 (MIRALAX) 17 g Oral Powd Pack Take 17 g by mouth daily as needed (Take to avoid constipation, especially if taking narcotic pain medications.). 20 packet 1    albuterol 108 (90 Base) MCG/ACT Inhalation Aero Soln Inhale 1 puff into the lungs every 6 (six) hours as needed for Wheezing.      donepezil 10 MG Oral Tab Take 1 tablet (10 mg total) by mouth nightly.      HYDROcodone-acetaminophen 5-325 MG Oral Tab Take 1 tablet by mouth every 6 (six) hours as needed  for Pain.      predniSONE 10 MG Oral Tab Take 1 tablet (10 mg total) by mouth daily.      fluticasone propionate 50 MCG/ACT Nasal Suspension 2 sprays by Nasal route daily.      albuterol (2.5 MG/3ML) 0.083% Inhalation Nebu Soln Take 3 mL (2.5 mg total) by nebulization every 6 (six) hours as needed for Wheezing.      latanoprost 0.005 % Ophthalmic Solution Place 1 drop into both eyes nightly.      furosemide 20 MG Oral Tab Take 1 tablet (20 mg total) by mouth daily.  0    aspirin 81 MG Oral Tab EC Take 1 tablet (81 mg total) by mouth at bedtime.         Allergies:  Allergies[1]    Review of Systems:   A comprehensive 10-point review of systems was completed.  Pertinent positives and negatives are noted in the the HPI.    Physical Exam:   CONSTITUTIONAL: Well developed, well nourished, in no acute distress  NEUROLOGIC: Alert and oriented  HEAD: Normocephalic, atraumatic  EYES: Sclera non-icteric  ENT: Hearing intact, moist mucous membranes  NECK: No obvious goiter or masses  RESPIRATORY: Normal respiratory effort  SKIN: No evident rashes  ABDOMEN: Soft, non-tender, non-distended      In total, 30 minutes were spent on this patient encounter (including chart review, patient history, physical, counseling, documentation, and communication).    Alok Sims MD  Staff Urologist  [facility]  Office: [phone]           [1]   Allergies  Allergen Reactions    Ciprofloxacin OTHER (SEE COMMENTS)     Heel pain    Ibuprofen ARRHYTHMIA and OTHER (SEE COMMENTS)    Methocarbamol OTHER (SEE COMMENTS)    Penicillins RASH, HIVES and UNKNOWN    Sulindac OTHER (SEE COMMENTS)     Hypertension- increase BP    Strawberry NAUSEA AND VOMITING    Cyclobenzaprine Hcl OTHER (SEE COMMENTS)    Mg Hydroxide-Quinapril Hcl OTHER (SEE COMMENTS)     High blood pressure and HA    Naproxen OTHER (SEE COMMENTS)

## 2025-04-04 ENCOUNTER — TELEPHONE (OUTPATIENT)
Dept: SURGERY | Facility: CLINIC | Age: 89
End: 2025-04-04

## 2025-04-04 NOTE — TELEPHONE ENCOUNTER
This encounter is now closed.     RN called patient in response to his inquiry. Answered all his questions and he agreed to plans.     Patient last seen by Dr Sims on 3/20:     Plan:   -Continue tamsulosin 0.8 mg daily  -Continue mirabegron, but give it a few more weeks to see some effect  -Return to clinic in 6 weeks  -May consider repeat cystoscopy if obstructive LUTS worsen as patient feels there is a blockage of the tip of his penis  -May consider CIC intermittently in the future

## 2025-04-11 ENCOUNTER — APPOINTMENT (OUTPATIENT)
Dept: PULMONOLOGY | Age: 89
End: 2025-04-11

## 2025-04-11 VITALS
BODY MASS INDEX: 27.37 KG/M2 | WEIGHT: 174.38 LBS | OXYGEN SATURATION: 94 % | HEART RATE: 94 BPM | SYSTOLIC BLOOD PRESSURE: 128 MMHG | DIASTOLIC BLOOD PRESSURE: 58 MMHG | HEIGHT: 67 IN

## 2025-04-11 DIAGNOSIS — J43.8 OTHER EMPHYSEMA  (CMD): Primary | ICD-10-CM

## 2025-04-18 ENCOUNTER — TELEPHONE (OUTPATIENT)
Dept: PULMONOLOGY | Age: 89
End: 2025-04-18

## 2025-04-21 RX ORDER — ALPRAZOLAM 0.25 MG
TABLET ORAL
Qty: 1 TABLET | Refills: 0 | Status: CANCELLED | OUTPATIENT
Start: 2025-04-21

## 2025-04-25 ENCOUNTER — TELEPHONE (OUTPATIENT)
Dept: UROLOGY | Age: 89
End: 2025-04-25

## 2025-04-29 RX ORDER — ALPRAZOLAM 0.25 MG
TABLET ORAL
Qty: 1 TABLET | Refills: 0 | Status: CANCELLED | OUTPATIENT
Start: 2025-04-29

## 2025-05-05 DIAGNOSIS — C61 MALIGNANT NEOPLASM OF PROSTATE  (CMD): Primary | ICD-10-CM

## 2025-05-15 ENCOUNTER — APPOINTMENT (OUTPATIENT)
Dept: UROLOGY | Age: 89
End: 2025-05-15

## 2025-05-21 RX ORDER — LATANOPROST 50 UG/ML
SOLUTION/ DROPS OPHTHALMIC
Qty: 2.5 ML | Refills: 3 | Status: SHIPPED | OUTPATIENT
Start: 2025-05-21

## 2025-06-20 ENCOUNTER — TELEPHONE (OUTPATIENT)
Dept: PULMONOLOGY | Age: 89
End: 2025-06-20

## 2025-08-04 ENCOUNTER — APPOINTMENT (OUTPATIENT)
Dept: PODIATRY | Age: 89
End: 2025-08-04

## 2025-09-05 ENCOUNTER — TELEPHONE (OUTPATIENT)
Dept: UROLOGY | Age: 89
End: 2025-09-05

## 2025-10-23 ENCOUNTER — APPOINTMENT (OUTPATIENT)
Dept: PULMONOLOGY | Age: 89
End: 2025-10-23

## (undated) DEVICE — PACK PBDS CYSTOSCOPY

## (undated) DEVICE — Device

## (undated) DEVICE — BLADDER EVACUATOR: Brand: UROVAC BLADDER EVACUATOR

## (undated) DEVICE — SPONGE 4X4 10PK

## (undated) DEVICE — DEVICE STATLOCK 3WAY STBL

## (undated) DEVICE — SYRINGE MED 30ML STD CLR PLAS LL TIP N CTRL

## (undated) DEVICE — GLOVE SUR 7 SENSICARE PI PIP CRM PWD F

## (undated) DEVICE — SLEEVE COMPR MD KNEE LEN SGL USE KENDALL SCD

## (undated) DEVICE — SOLUTION IRRIG 1000ML ST H2O AQUALITE PLAS

## (undated) DEVICE — SOLUTION IRRIG 3000ML 0.9% NACL FLX CONT

## (undated) DEVICE — EVACUATOR URO BLDR FOR MANEUVERABILITY

## (undated) DEVICE — DEVICE STATLOCK 2WAY STBL

## (undated) DEVICE — HF-RESECTION ELECTRODE PLASMALOOP LOOP, MEDIUM, 24 FR., 12°/16°, ESG TURIS: Brand: OLYMPUS

## (undated) DEVICE — BAG,DRAINAGE,4L,A/R TOWER,METAL CLAMP: Brand: MEDLINE

## (undated) NOTE — LETTER
To:  Dr. Burnett  Date:  2024  Patient Name: Tung Maria DOB-Age / Sex: 7/15/1935-A: 89 y  male  Medical Records: [id number]   CSN: [id number]      Clearance for Surgery Requested by Surgeon    Request for:  Pulmonary Clearance    Requested by Surgeon: Dr. Sims    Surgical Date: 10/7/2024    Procedure: TRANSURETHRAL RESECTION PROSTATE, N/A      Please fax the clearance note to the Pre-Admission Testing department.  Thank you.

## (undated) NOTE — LETTER
OUTSIDE TESTING RESULT REQUEST     IMPORTANT: FOR YOUR IMMEDIATE ATTENTION  Please FAX all test results listed below to: [phone]     Testing already done on or about:      * * * * If testing is NOT complete, arrange with patient A.S.A.P. * * * *      Patient Name: Tung Maria  Surgery Date: 10/7/2024  Medical Record: [id number]  CSN: [id number]  : 7/15/1935 - A: 89 y     Sex: male  Surgeon(s):  Alok Sims MD  Procedure: TRANSURETHRAL RESECTION PROSTATE  Anesthesia Type: General     Surgeon: Alok Sims MD     The following Testing and Time Line are REQUIRED PER ANESTHESIA     PT/INR within  30 days  Urine C&S within  14 days      Thank You,   Sent by: Meera VELEZ

## (undated) NOTE — LETTER
To:  Dr. Tobar  Date:  2024  Patient Name: Tung Maria DOB-Age / Sex: 7/15/1935-A: 89 y  male  Medical Records: [id number]   CSN: [id number]      Clearance for Surgery Requested by Surgeon    Request for:  Medical Clearance    Requested by Surgeon: Dr. Sims    Surgical Date: 10/7/2024    Procedure: TRANSURETHRAL RESECTION PROSTATE, N/A      Please fax the clearance note to the Pre-Admission Testing department.  Thank you.